# Patient Record
Sex: MALE | Race: WHITE | Employment: FULL TIME | URBAN - METROPOLITAN AREA
[De-identification: names, ages, dates, MRNs, and addresses within clinical notes are randomized per-mention and may not be internally consistent; named-entity substitution may affect disease eponyms.]

---

## 2018-01-25 ENCOUNTER — HOSPITAL ENCOUNTER (OUTPATIENT)
Dept: CT IMAGING | Age: 48
Discharge: HOME | End: 2018-01-25
Attending: NURSE PRACTITIONER
Payer: COMMERCIAL

## 2018-01-25 DIAGNOSIS — K62.5 RECTAL BLEEDING: ICD-10-CM

## 2018-01-25 DIAGNOSIS — R10.32 LLQ PAIN: ICD-10-CM

## 2018-01-25 LAB — CREAT UR-MCNC: 1 MG/DL (ref 0.6–1.3)

## 2018-01-25 PROCEDURE — 74011636320 HC RX REV CODE- 636/320

## 2018-01-25 PROCEDURE — 74177 CT ABD & PELVIS W/CONTRAST: CPT

## 2018-01-25 PROCEDURE — 82565 ASSAY OF CREATININE: CPT

## 2018-01-25 RX ADMIN — IOPAMIDOL 100 ML: 612 INJECTION, SOLUTION INTRAVENOUS at 16:00

## 2018-08-22 ENCOUNTER — OFFICE VISIT (OUTPATIENT)
Dept: FAMILY MEDICINE CLINIC | Age: 48
End: 2018-08-22

## 2018-08-22 VITALS
SYSTOLIC BLOOD PRESSURE: 121 MMHG | RESPIRATION RATE: 12 BRPM | WEIGHT: 199.2 LBS | HEART RATE: 70 BPM | DIASTOLIC BLOOD PRESSURE: 72 MMHG | HEIGHT: 63 IN | TEMPERATURE: 97.8 F | BODY MASS INDEX: 35.3 KG/M2 | OXYGEN SATURATION: 99 %

## 2018-08-22 DIAGNOSIS — K21.9 GERD WITHOUT ESOPHAGITIS: ICD-10-CM

## 2018-08-22 DIAGNOSIS — M25.552 PAIN OF LEFT HIP JOINT: Primary | ICD-10-CM

## 2018-08-22 DIAGNOSIS — Z12.5 PROSTATE CANCER SCREENING: ICD-10-CM

## 2018-08-22 DIAGNOSIS — Z11.59 NEED FOR HEPATITIS C SCREENING TEST: ICD-10-CM

## 2018-08-22 PROBLEM — E66.01 SEVERE OBESITY (BMI 35.0-39.9): Status: ACTIVE | Noted: 2018-08-22

## 2018-08-22 RX ORDER — LANOLIN ALCOHOL/MO/W.PET/CERES
1000 CREAM (GRAM) TOPICAL DAILY
COMMUNITY

## 2018-08-22 RX ORDER — OMEPRAZOLE 40 MG/1
CAPSULE, DELAYED RELEASE ORAL
Refills: 3 | COMMUNITY
Start: 2018-08-11 | End: 2021-12-01

## 2018-08-22 RX ORDER — BISMUTH SUBSALICYLATE 262 MG
1 TABLET,CHEWABLE ORAL DAILY
COMMUNITY

## 2018-08-22 RX ORDER — PYRIDOXINE HCL (VITAMIN B6) 100 MG
100 TABLET ORAL DAILY
COMMUNITY

## 2018-08-22 NOTE — PROGRESS NOTES
Chief Complaint   Patient presents with   [address] [place] [address]     1. Have you been to the ER, urgent care clinic since your last visit? Hospitalized since your last visit? No    2. Have you seen or consulted any other health care providers outside of the [facility] since your last visit? Include any pap smears or colon screening.  No

## 2018-08-22 NOTE — PATIENT INSTRUCTIONS
Body Mass Index: Care Instructions  Your Care Instructions    Body mass index (BMI) can help you see if your weight is raising your risk for health problems. It uses a formula to compare how much you weigh with how tall you are. · A BMI lower than 18.5 is considered underweight. · A BMI between 18.5 and 24.9 is considered healthy. · A BMI between 25 and 29.9 is considered overweight. A BMI of 30 or higher is considered obese. If your BMI is in the normal range, it means that you have a lower risk for weight-related health problems. If your BMI is in the overweight or obese range, you may be at increased risk for weight-related health problems, such as high blood pressure, heart disease, stroke, arthritis or joint pain, and diabetes. If your BMI is in the underweight range, you may be at increased risk for health problems such as fatigue, lower protection (immunity) against illness, muscle loss, bone loss, hair loss, and hormone problems. BMI is just one measure of your risk for weight-related health problems. You may be at higher risk for health problems if you are not active, you eat an unhealthy diet, or you drink too much alcohol or use tobacco products. Follow-up care is a key part of your treatment and safety. Be sure to make and go to all appointments, and call your doctor if you are having problems. It's also a good idea to know your test results and keep a list of the medicines you take. How can you care for yourself at home? · Practice healthy eating habits. This includes eating plenty of fruits, vegetables, whole grains, lean protein, and low-fat dairy. · If your doctor recommends it, get more exercise. Walking is a good choice. Bit by bit, increase the amount you walk every day. Try for at least 30 minutes on most days of the week. · Do not smoke. Smoking can increase your risk for health problems. If you need help quitting, talk to your doctor about stop-smoking programs and medicines. These can increase your chances of quitting for good. · Limit alcohol to 2 drinks a day for men and 1 drink a day for women. Too much alcohol can cause health problems. If you have a BMI higher than 25  · Your doctor may do other tests to check your risk for weight-related health problems. This may include measuring the distance around your waist. A waist measurement of more than 40 inches in men or 35 inches in women can increase the risk of weight-related health problems. · Talk with your doctor about steps you can take to stay healthy or improve your health. You may need to make lifestyle changes to lose weight and stay healthy, such as changing your diet and getting regular exercise. If you have a BMI lower than 18.5  · Your doctor may do other tests to check your risk for health problems. · Talk with your doctor about steps you can take to stay healthy or improve your health. You may need to make lifestyle changes to gain or maintain weight and stay healthy, such as getting more healthy foods in your diet and doing exercises to build muscle. Where can you learn more? Go to http://oni-lay.info/. Enter S176 in the search box to learn more about \"Body Mass Index: Care Instructions. \"  Current as of: October 13, 2016  Content Version: 11.4  © 5166-7639 [employer]. Care instructions adapted under license by [employer] (which disclaims liability or warranty for this information). If you have questions about a medical condition or this instruction, always ask your healthcare professional. [place] [address] any warranty or liability for your use of this information. Gastroesophageal Reflux Disease (GERD): Care Instructions  Your Care Instructions    Gastroesophageal reflux disease (GERD) is the backward flow of stomach acid into the esophagus. The esophagus is the tube that leads from your throat to your stomach.  A one-way valve prevents the stomach acid from moving up into this tube. When you have GERD, this valve does not close tightly enough. If you have mild GERD symptoms including heartburn, you may be able to control the problem with antacids or over-the-counter medicine. Changing your diet, losing weight, and making other lifestyle changes can also help reduce symptoms. Follow-up care is a key part of your treatment and safety. Be sure to make and go to all appointments, and call your doctor if you are having problems. It's also a good idea to know your test results and keep a list of the medicines you take. How can you care for yourself at home? · Take your medicines exactly as prescribed. Call your doctor if you think you are having a problem with your medicine. · Your doctor may recommend over-the-counter medicine. For mild or occasional indigestion, antacids, such as Tums, Gaviscon, Mylanta, or Maalox, may help. Your doctor also may recommend over-the-counter acid reducers, such as Pepcid AC, Tagamet HB, Zantac 75, or Prilosec. Read and follow all instructions on the label. If you use these medicines often, talk with your doctor. · Change your eating habits. ¨ It's best to eat several small meals instead of two or three large meals. ¨ After you eat, wait 2 to 3 hours before you lie down. ¨ Chocolate, mint, and alcohol can make GERD worse. ¨ Spicy foods, foods that have a lot of acid (like tomatoes and oranges), and coffee can make GERD symptoms worse in some people. If your symptoms are worse after you eat a certain food, you may want to stop eating that food to see if your symptoms get better. · Do not smoke or chew tobacco. Smoking can make GERD worse. If you need help quitting, talk to your doctor about stop-smoking programs and medicines. These can increase your chances of quitting for good.   · If you have GERD symptoms at night, raise the head of your bed 6 to 8 inches by putting the frame on blocks or placing a foam wedge under the head of your mattress. (Adding extra pillows does not work.)  · Do not wear tight clothing around your middle. · Lose weight if you need to. Losing just 5 to 10 pounds can help. When should you call for help? Call your doctor now or seek immediate medical care if:    · You have new or different belly pain.     · Your stools are black and tarlike or have streaks of blood.    Watch closely for changes in your health, and be sure to contact your doctor if:    · Your symptoms have not improved after 2 days.     · Food seems to catch in your throat or chest.   Where can you learn more? Go to http://oni-lay.info/. Enter T266 in the search box to learn more about \"Gastroesophageal Reflux Disease (GERD): Care Instructions. \"  Current as of: May 12, 2017  Content Version: 11.7  © 6077-2380 [employer]. Care instructions adapted under license by [employer] (which disclaims liability or warranty for this information). If you have questions about a medical condition or this instruction, always ask your healthcare professional. [address] any warranty or liability for your use of this information.

## 2018-08-22 NOTE — PROGRESS NOTES
Flor Patterson II is a 46 y.o. male  presents for establish care. He has hip pain on left. He fell about 2 months ago. No Known Allergies  Outpatient Prescriptions Marked as Taking for the 8/22/18 encounter (Office Visit) with Ventura Baltazar MD   Medication Sig Dispense Refill    omeprazole (PRILOSEC) 40 mg capsule TAKE ONE CAPSULE BY MOUTH TWICE A DAY  3    cyanocobalamin 1,000 mcg tablet Take 1,000 mcg by mouth daily.  pyridoxine, vitamin B6, (VITAMIN B-6) 100 mg tablet Take 100 mg by mouth daily.  glucosamine/chondr benz A sod (OSTEO BI-FLEX PO) Take  by mouth.  multivitamin (ONE A DAY) tablet Take 1 Tab by mouth daily.  ranitidine (ZANTAC) 150 mg tablet Take 300 mg by mouth two (2) times a day. Patient Active Problem List   Diagnosis Code    Severe obesity (BMI 35.0-39.9) ([facility] E66.01     Past Medical History:   Diagnosis Date    GERD (gastroesophageal reflux disease)      Social History     Social History    Marital status:      Spouse name: N/A    Number of children: N/A    Years of education: N/A     Social History Main Topics    Smoking status: Never Smoker    Smokeless tobacco: None    Alcohol use No    Drug use: No    Sexual activity: Not Asked     Other Topics Concern    None     Social History Narrative     No family history on file. Review of Systems   Constitutional: Negative for chills and fever. Respiratory: Negative. Cardiovascular: Negative for chest pain and palpitations. Genitourinary: Negative. Skin: Negative for itching and rash. Neurological: Negative. Psychiatric/Behavioral: Negative.         Vitals:    08/22/18 1356   BP: 121/72   Pulse: 70   Resp: 12   Temp: 97.8 °F (36.6 °C)   TempSrc: Oral   SpO2: 99%   Weight: 199 lb 3.2 oz (90.4 kg)   Height: 5' 3\" (1.6 m)   PainSc:   6   PainLoc: Hip       Physical Exam   Constitutional: He is oriented to person, place, and time and well-developed, well-nourished, and in no distress. Neck: Normal range of motion. Neck supple. Cardiovascular: Normal rate, regular rhythm and normal heart sounds. Pulmonary/Chest: Effort normal and breath sounds normal.   Abdominal: Soft. Bowel sounds are normal.   Musculoskeletal: Normal range of motion. Neurological: He is alert and oriented to person, place, and time. Skin: Skin is warm and dry. Psychiatric: Mood, memory, affect and judgment normal.   Nursing note and vitals reviewed. Assessment/Plan      ICD-10-CM ICD-9-CM    1. Pain of left hip joint M25.552 719.45    2. GERD without esophagitis K21.9 530.81 CBC WITH AUTOMATED DIFF      METABOLIC PANEL, COMPREHENSIVE      LIPID PANEL      CBC WITH AUTOMATED DIFF      METABOLIC PANEL, COMPREHENSIVE      LIPID PANEL   3. Need for hepatitis C screening test Z11.59 V73.89 HEPATITIS C AB      HEPATITIS C AB   4. Prostate cancer screening Z12.5 V76.44 PSA, TOTAL &  FREE      PSA, TOTAL &  FREE       Follow-up Disposition:  Return in about 3 months (around 11/22/2018). lab results and schedule of future lab studies reviewed with patient    I have discussed the diagnosis with the patient and the intended plan of care as seen in the above orders. The patient has received an after-visit summary and questions were answered concerning future plans. I have discussed medication, side effects, and warnings with the patient in detail. The patient verbalized understanding and is in agreement with the plan of care. The patient will contact the office with any additional concerns. Discussed the patient's BMI with him. The BMI follow up plan is as follows:     dietary management education, guidance, and counseling  encourage exercise  monitor weight  prescribed dietary intake    An After Visit Summary was printed and given to the patient.     Edd Bowen MD

## 2018-08-22 NOTE — MR AVS SNAPSHOT
[address] [place] [address] Road 
[phone] Patient: Dionna Mazariegos 
MRN: [id number] :1970 Visit Information Date & Time Provider Department Dept. Phone Encounter #  
 2018  2:30 PM Sean Cruz MD [facility] [phone] [id number] Follow-up Instructions Return in about 3 months (around 2018). Your Appointments 2018  2:30 PM  
New Patient with Sean Cruz MD  
[facility] Juice Flores) Appt Note: NP to establish care and hip issues since a fall 3-4 months ago, bp and sugar concerns [facility] [address] [place] [address]  
[phone]  
  
   
 [facility] [address] [place] [address] Upcoming Health Maintenance Date Due DTaP/Tdap/Td series (1 - Tdap) 1989 FOBT Q 1 YEAR AGE 50-75 2018 Influenza Age 5 to Adult 2018 Allergies as of 2018  Review Complete On: 2018 By: RT Alfredo No Known Allergies Current Immunizations  Never Reviewed No immunizations on file. Not reviewed this visit You Were Diagnosed With   
  
 Codes Comments Pain of left hip joint    -  Primary ICD-10-CM: M25.552 ICD-9-CM: 719.45 GERD without esophagitis     ICD-10-CM: K21.9 ICD-9-CM: 530.81 Need for hepatitis C screening test     ICD-10-CM: Z11.59 
ICD-9-CM: V73.89 Prostate cancer screening     ICD-10-CM: Z12.5 ICD-9-CM: V76.44 Vitals BP Pulse Temp Resp Height(growth percentile) Weight(growth percentile) 121/72 (BP 1 Location: Left arm, BP Patient Position: Sitting) 70 97.8 °F (36.6 °C) (Oral) 12 5' 3\" (1.6 m) 199 lb 3.2 oz (90.4 kg) SpO2 BMI Smoking Status 99% 35.29 kg/m2 Never Smoker BMI and BSA Data  Body Mass Index Body Surface Area  
 35.29 kg/m 2 2 m 2  
  
  
 Your Updated Medication List  
  
   
This list is accurate as of 8/22/18  2:16 PM.  Always use your most recent med list.  
  
  
  
  
 cyanocobalamin 1,000 mcg tablet Take 1,000 mcg by mouth daily. multivitamin tablet Commonly known as:  ONE A DAY Take 1 Tab by mouth daily. omeprazole 40 mg capsule Commonly known as:  PRILOSEC  
TAKE ONE CAPSULE BY MOUTH TWICE A DAY  
  
 OSTEO BI-FLEX PO Take  by mouth. VITAMIN B-6 100 mg tablet Generic drug:  pyridoxine (vitamin B6) Take 100 mg by mouth daily. ZANTAC 150 mg tablet Generic drug:  raNITIdine Take 300 mg by mouth two (2) times a day. Follow-up Instructions Return in about 3 months (around 11/22/2018). To-Do List   
 08/22/2018 Lab:  CBC WITH AUTOMATED DIFF   
  
 08/22/2018 Lab:  HEPATITIS C AB   
  
 08/22/2018 Lab:  LIPID PANEL   
  
 08/22/2018 Lab:  METABOLIC PANEL, COMPREHENSIVE   
  
 08/22/2018 Lab:  PSA, TOTAL &  FREE Patient Instructions Body Mass Index: Care Instructions Your Care Instructions Body mass index (BMI) can help you see if your weight is raising your risk for health problems. It uses a formula to compare how much you weigh with how tall you are. · A BMI lower than 18.5 is considered underweight. · A BMI between 18.5 and 24.9 is considered healthy. · A BMI between 25 and 29.9 is considered overweight. A BMI of 30 or higher is considered obese. If your BMI is in the normal range, it means that you have a lower risk for weight-related health problems. If your BMI is in the overweight or obese range, you may be at increased risk for weight-related health problems, such as high blood pressure, heart disease, stroke, arthritis or joint pain, and diabetes.  If your BMI is in the underweight range, you may be at increased risk for health problems such as fatigue, lower protection (immunity) against illness, muscle loss, bone loss, hair loss, and hormone problems. BMI is just one measure of your risk for weight-related health problems. You may be at higher risk for health problems if you are not active, you eat an unhealthy diet, or you drink too much alcohol or use tobacco products. Follow-up care is a key part of your treatment and safety. Be sure to make and go to all appointments, and call your doctor if you are having problems. It's also a good idea to know your test results and keep a list of the medicines you take. How can you care for yourself at home? · Practice healthy eating habits. This includes eating plenty of fruits, vegetables, whole grains, lean protein, and low-fat dairy. · If your doctor recommends it, get more exercise. Walking is a good choice. Bit by bit, increase the amount you walk every day. Try for at least 30 minutes on most days of the week. · Do not smoke. Smoking can increase your risk for health problems. If you need help quitting, talk to your doctor about stop-smoking programs and medicines. These can increase your chances of quitting for good. · Limit alcohol to 2 drinks a day for men and 1 drink a day for women. Too much alcohol can cause health problems. If you have a BMI higher than 25 · Your doctor may do other tests to check your risk for weight-related health problems. This may include measuring the distance around your waist. A waist measurement of more than 40 inches in men or 35 inches in women can increase the risk of weight-related health problems. · Talk with your doctor about steps you can take to stay healthy or improve your health. You may need to make lifestyle changes to lose weight and stay healthy, such as changing your diet and getting regular exercise. If you have a BMI lower than 18.5 · Your doctor may do other tests to check your risk for health problems.  
· Talk with your doctor about steps you can take to stay healthy or improve your health. You may need to make lifestyle changes to gain or maintain weight and stay healthy, such as getting more healthy foods in your diet and doing exercises to build muscle. Where can you learn more? Go to http://oni-lay.info/. Enter S176 in the search box to learn more about \"Body Mass Index: Care Instructions. \" Current as of: October 13, 2016 Content Version: 11.4 © 9344-1453 [employer]. Care instructions adapted under license by [employer] (which disclaims liability or warranty for this information). If you have questions about a medical condition or this instruction, always ask your healthcare professional. [address] any warranty or liability for your use of this information. Gastroesophageal Reflux Disease (GERD): Care Instructions Your Care Instructions Gastroesophageal reflux disease (GERD) is the backward flow of stomach acid into the esophagus. The esophagus is the tube that leads from your throat to your stomach. A one-way valve prevents the stomach acid from moving up into this tube. When you have GERD, this valve does not close tightly enough. If you have mild GERD symptoms including heartburn, you may be able to control the problem with antacids or over-the-counter medicine. Changing your diet, losing weight, and making other lifestyle changes can also help reduce symptoms. Follow-up care is a key part of your treatment and safety. Be sure to make and go to all appointments, and call your doctor if you are having problems. It's also a good idea to know your test results and keep a list of the medicines you take. How can you care for yourself at home? · Take your medicines exactly as prescribed. Call your doctor if you think you are having a problem with your medicine. · Your doctor may recommend over-the-counter medicine.  For mild or occasional indigestion, antacids, such as Tums, Gaviscon, Mylanta, or Maalox, may help. Your doctor also may recommend over-the-counter acid reducers, such as Pepcid AC, Tagamet HB, Zantac 75, or Prilosec. Read and follow all instructions on the label. If you use these medicines often, talk with your doctor. · Change your eating habits. ¨ It's best to eat several small meals instead of two or three large meals. ¨ After you eat, wait 2 to 3 hours before you lie down. ¨ Chocolate, mint, and alcohol can make GERD worse. ¨ Spicy foods, foods that have a lot of acid (like tomatoes and oranges), and coffee can make GERD symptoms worse in some people. If your symptoms are worse after you eat a certain food, you may want to stop eating that food to see if your symptoms get better. · Do not smoke or chew tobacco. Smoking can make GERD worse. If you need help quitting, talk to your doctor about stop-smoking programs and medicines. These can increase your chances of quitting for good. · If you have GERD symptoms at night, raise the head of your bed 6 to 8 inches by putting the frame on blocks or placing a foam wedge under the head of your mattress. (Adding extra pillows does not work.) · Do not wear tight clothing around your middle. · Lose weight if you need to. Losing just 5 to 10 pounds can help. When should you call for help? Call your doctor now or seek immediate medical care if: 
  · You have new or different belly pain.  
  · Your stools are black and tarlike or have streaks of blood.  
 Watch closely for changes in your health, and be sure to contact your doctor if: 
  · Your symptoms have not improved after 2 days.  
  · Food seems to catch in your throat or chest.  
Where can you learn more? Go to http://oni-lay.info/. Enter W875 in the search box to learn more about \"Gastroesophageal Reflux Disease (GERD): Care Instructions. \" Current as of: May 12, 2017 Content Version: 11.7 © 3737-5254 [employer]. Care instructions adapted under license by [employer] (which disclaims liability or warranty for this information). If you have questions about a medical condition or this instruction, always ask your healthcare professional. [address] any warranty or liability for your use of this information. Introducing [facility] HE[facility]! [facility] introduces [employer] patient portal. Now you can access parts of your medical record, email your doctor's office, and request medication refills online. 1. In your internet browser, go to [email or web address] 2. Click on the First Time User? Click Here link in the Sign In box. You will see the New Member Sign Up page. 3. Enter your [employer] Access Code exactly as it appears below. You will not need to use this code after youve completed the sign-up process. If you do not sign up before the expiration date, you must request a new code. · [employer] Access Code: [email or web address] Expires: 11/20/2018  2:16 PM 
 
4. Enter the last four digits of your Social Security Number (xxxx) and Date of Birth (mm/dd/yyyy) as indicated and click Submit. You will be taken to the next sign-up page. 5. Create a [employer] ID. This will be your [employer] login ID and cannot be changed, so think of one that is secure and easy to remember. 6. Create a [employer] password. You can change your password at any time. 7. Enter your Password Reset Question and Answer. This can be used at a later time if you forget your password. 8. Enter your e-mail address. You will receive e-mail notification when new information is available in [address] 9. Click Sign Up. You can now view and download portions of your medical record. 10. Click the Download Summary menu link to download a portable copy of your medical information.  
 
If you have questions, please visit the Frequently Asked Questions section of the [employer]Mentor. Remember, [employer] is NOT to be used for urgent needs. For medical emergencies, dial 911. Now available from your iPhone and Android! Please provide this summary of care documentation to your next provider. Your primary care clinician is listed as [id number] [facility]. If you have any questions after today's visit, please call [phone].

## 2018-08-24 ENCOUNTER — TELEPHONE (OUTPATIENT)
Dept: FAMILY MEDICINE CLINIC | Age: 48
End: 2018-08-24

## 2018-08-24 LAB
A-G RATIO,AGRAT: 2 RATIO (ref 1.1–2.6)
ABSOLUTE LYMPHOCYTE COUNT, 10803: 1.3 K/UL (ref 1–4.8)
ALBUMIN SERPL-MCNC: 4.5 G/DL (ref 3.5–5)
ALP SERPL-CCNC: 100 U/L (ref 25–115)
ALT SERPL-CCNC: 45 U/L (ref 5–40)
ANION GAP SERPL CALC-SCNC: 18 MMOL/L
AST SERPL W P-5'-P-CCNC: 43 U/L (ref 10–37)
BASOPHILS # BLD: 0 K/UL (ref 0–0.2)
BASOPHILS NFR BLD: 0 % (ref 0–2)
BILIRUB SERPL-MCNC: 0.4 MG/DL (ref 0.2–1.2)
BUN SERPL-MCNC: 23 MG/DL (ref 6–22)
CALCIUM SERPL-MCNC: 9.1 MG/DL (ref 8.4–10.4)
CHLORIDE SERPL-SCNC: 101 MMOL/L (ref 98–110)
CHOLEST SERPL-MCNC: 185 MG/DL (ref 110–200)
CO2 SERPL-SCNC: 20 MMOL/L (ref 20–32)
CREAT SERPL-MCNC: 1 MG/DL (ref 0.5–1.2)
EOSINOPHIL # BLD: 0.2 K/UL (ref 0–0.5)
EOSINOPHIL NFR BLD: 4 % (ref 0–6)
ERYTHROCYTE [DISTWIDTH] IN BLOOD BY AUTOMATED COUNT: 13.8 % (ref 10–15.5)
GFRAA, 66117: >60
GFRNA, 66118: >60
GLOBULIN,GLOB: 2.3 G/DL (ref 2–4)
GLUCOSE SERPL-MCNC: 92 MG/DL (ref 70–99)
GRANULOCYTES,GRANS: 63 % (ref 40–75)
HCT VFR BLD AUTO: 42.8 % (ref 39.3–51.6)
HCV AB SER IA-ACNC: NORMAL
HDLC SERPL-MCNC: 34 MG/DL (ref 40–59)
HDLC SERPL-MCNC: 5.4 MG/DL (ref 0–5)
HGB BLD-MCNC: 13.3 G/DL (ref 13.1–17.2)
LDLC SERPL CALC-MCNC: 89 MG/DL (ref 50–99)
LYMPHOCYTES, LYMLT: 26 % (ref 20–45)
MCH RBC QN AUTO: 27 PG (ref 26–34)
MCHC RBC AUTO-ENTMCNC: 31 G/DL (ref 31–36)
MCV RBC AUTO: 88 FL (ref 80–95)
MONOCYTES # BLD: 0.3 K/UL (ref 0.1–1)
MONOCYTES NFR BLD: 6 % (ref 3–12)
NEUTROPHILS # BLD AUTO: 3.1 K/UL (ref 1.8–7.7)
PLATELET # BLD AUTO: 195 K/UL (ref 140–440)
PMV BLD AUTO: 12.1 FL (ref 9–13)
POTASSIUM SERPL-SCNC: 4.2 MMOL/L (ref 3.5–5.5)
PROT SERPL-MCNC: 6.8 G/DL (ref 6.4–8.3)
PSA FREE MFR SERPL: 12.2 %
PSA FREE SERPL-MCNC: 0.11 NG/ML
PSA SERPL-MCNC: 0.9 NG/ML
RBC # BLD AUTO: 4.86 M/UL (ref 3.8–5.8)
SODIUM SERPL-SCNC: 139 MMOL/L (ref 133–145)
TRIGL SERPL-MCNC: 311 MG/DL (ref 40–149)
VLDLC SERPL CALC-MCNC: 62 MG/DL (ref 8–30)
WBC # BLD AUTO: 4.9 K/UL (ref 4–11)

## 2018-08-24 NOTE — TELEPHONE ENCOUNTER
Patient's wife called for lab results, please call her at [phone]. She is aware the nurse is rooming patients this morning and it may be later afternoon.

## 2018-08-28 NOTE — PROGRESS NOTES
Pt's wife who is on HIPAA is aware of results. Pt expressed clear understanding and had no further questions.

## 2018-08-30 ENCOUNTER — OFFICE VISIT (OUTPATIENT)
Dept: FAMILY MEDICINE CLINIC | Age: 48
End: 2018-08-30

## 2018-08-30 VITALS
OXYGEN SATURATION: 98 % | DIASTOLIC BLOOD PRESSURE: 81 MMHG | SYSTOLIC BLOOD PRESSURE: 118 MMHG | TEMPERATURE: 97.7 F | HEART RATE: 78 BPM | WEIGHT: 200.2 LBS | HEIGHT: 63 IN | RESPIRATION RATE: 12 BRPM | BODY MASS INDEX: 35.47 KG/M2

## 2018-08-30 DIAGNOSIS — M25.552 PAIN OF LEFT HIP JOINT: Primary | ICD-10-CM

## 2018-08-30 RX ORDER — DICLOFENAC SODIUM 50 MG/1
50 TABLET, DELAYED RELEASE ORAL 2 TIMES DAILY
Qty: 40 TAB | Refills: 1 | Status: SHIPPED | OUTPATIENT
Start: 2018-08-30 | End: 2018-11-06 | Stop reason: ALTCHOICE

## 2018-08-30 NOTE — PROGRESS NOTES
Matilda Jaquez II is a 46 y.o. male  presents for left hip pain. It radiates to lower back. Sxs are intermittent. Has tried otc meds but it has not helped. No Known Allergies Outpatient Prescriptions Marked as Taking for the 8/30/18 encounter (Office Visit) with Peggy Marie MD  
Medication Sig Dispense Refill  omeprazole (PRILOSEC) 40 mg capsule TAKE ONE CAPSULE BY MOUTH TWICE A DAY  3  
 cyanocobalamin 1,000 mcg tablet Take 1,000 mcg by mouth daily.  pyridoxine, vitamin B6, (VITAMIN B-6) 100 mg tablet Take 100 mg by mouth daily.  glucosamine/chondr benz A sod (OSTEO BI-FLEX PO) Take  by mouth.  multivitamin (ONE A DAY) tablet Take 1 Tab by mouth daily.  ranitidine (ZANTAC) 150 mg tablet Take 300 mg by mouth two (2) times a day. Patient Active Problem List  
Diagnosis Code  Severe obesity (BMI 35.0-39.9) ([facility]) E66.01 Past Medical History:  
Diagnosis Date  GERD (gastroesophageal reflux disease) Social History Social History  Marital status:  Spouse name: N/A  
 Number of children: N/A  
 Years of education: N/A Social History Main Topics  Smoking status: Never Smoker  Smokeless tobacco: None  Alcohol use No  
 Drug use: No  
 Sexual activity: Not Asked Other Topics Concern  None Social History Narrative No family history on file. Review of Systems Constitutional: Negative for chills, fever, malaise/fatigue and weight loss. Eyes: Negative for blurred vision. Respiratory: Negative for shortness of breath and wheezing. Cardiovascular: Negative for chest pain. Gastrointestinal: Negative for nausea and vomiting. Genitourinary: Negative. Negative for dysuria, frequency and urgency. Musculoskeletal: Positive for joint pain (left hip). Negative for myalgias. Skin: Negative for rash. Neurological: Negative for weakness. Vitals:  
 08/30/18 1507 BP: 118/81 Pulse: 78 Resp: 12 Temp: 97.7 °F (36.5 °C) TempSrc: Oral  
SpO2: 98% Weight: 200 lb 3.2 oz (90.8 kg) Height: 5' 3\" (1.6 m) PainSc:   5 PainLoc: Hip Physical Exam  
Constitutional: He is oriented to person, place, and time and well-developed, well-nourished, and in no distress. Cardiovascular: Normal rate, regular rhythm and normal heart sounds. Pulmonary/Chest: Effort normal and breath sounds normal.  
Musculoskeletal: Normal range of motion. He exhibits tenderness. He exhibits no edema or deformity. Neurological: He is alert and oriented to person, place, and time. Skin: Skin is warm and dry. Nursing note and vitals reviewed. Assessment/Plan ICD-10-CM ICD-9-CM 1. Pain of left hip joint M25.552 719.45 XR HIP LT W OR WO PELV 2-3 VWS  
   diclofenac EC (VOLTAREN) 50 mg EC tablet I have discussed the diagnosis with the patient and the intended plan of care as seen in the above orders. The patient has received an after-visit summary and questions were answered concerning future plans. I have discussed medication, side effects, and warnings with the patient in detail. The patient verbalized understanding and is in agreement with the plan of care. The patient will contact the office with any additional concerns. Follow-up Disposition: 
Return if symptoms worsen or fail to improve. 
lab results and schedule of future lab studies reviewed with patient Sisi Pretty MD

## 2018-08-30 NOTE — PROGRESS NOTES
Chief Complaint Patient presents with  
 Hip Pain  
  left hip pain x 2 months 1. Have you been to the ER, urgent care clinic since your last visit? Hospitalized since your last visit? No 
 
2. Have you seen or consulted any other health care providers outside of the [address] [place] [address] since your last visit? Include any pap smears or colon screening.  No

## 2018-08-30 NOTE — PATIENT INSTRUCTIONS
Hip Pain: Care Instructions Your Care Instructions Hip pain may be caused by many things, including overuse, a fall, or a twisting movement. Another cause of hip pain is arthritis. Your pain may increase when you stand up, walk, or squat. The pain may come and go or may be constant. Home treatment can help relieve hip pain, swelling, and stiffness. If your pain is ongoing, you may need more tests and treatment. Follow-up care is a key part of your treatment and safety. Be sure to make and go to all appointments, and call your doctor if you are having problems. It's also a good idea to know your test results and keep a list of the medicines you take. How can you care for yourself at home? · Take pain medicines exactly as directed. ¨ If the doctor gave you a prescription medicine for pain, take it as prescribed. ¨ If you are not taking a prescription pain medicine, ask your doctor if you can take an over-the-counter medicine. · Rest and protect your hip. Take a break from any activity, including standing or walking, that may cause pain. · Put ice or a cold pack against your hip for 10 to 20 minutes at a time. Try to do this every 1 to 2 hours for the next 3 days (when you are awake) or until the swelling goes down. Put a thin cloth between the ice and your skin. · Sleep on your healthy side with a pillow between your knees, or sleep on your back with pillows under your knees. · If there is no swelling, you can put moist heat, a heating pad, or a warm cloth on your hip. Do gentle stretching exercises to help keep your hip flexible. · Learn how to prevent falls. Have your vision and hearing checked regularly. Wear slippers or shoes with a nonskid sole. · Stay at a healthy weight. · Wear comfortable shoes. When should you call for help? Call 911 anytime you think you may need emergency care. For example, call if: 
  · You have sudden chest pain and shortness of breath, or you cough up blood.   · You are not able to stand or walk or bear weight.  
  · Your buttocks, legs, or feet feel numb or tingly.  
  · Your leg or foot is cool or pale or changes color.  
  · You have severe pain.  
 Call your doctor now or seek immediate medical care if: 
  · You have signs of infection, such as: 
¨ Increased pain, swelling, warmth, or redness in the hip area. ¨ Red streaks leading from the hip area. ¨ Pus draining from the hip area. ¨ A fever.  
  · You have signs of a blood clot, such as: 
¨ Pain in your calf, back of the knee, thigh, or groin. ¨ Redness and swelling in your leg or groin.  
  · You are not able to bend, straighten, or move your leg normally.  
  · You have trouble urinating or having bowel movements.  
 Watch closely for changes in your health, and be sure to contact your doctor if: 
  · You do not get better as expected. Where can you learn more? Go to http://oni-lay.info/. Enter T419 in the search box to learn more about \"Hip Pain: Care Instructions. \" Current as of: November 20, 2017 Content Version: 11.7 © 6685-0787 [employer]. Care instructions adapted under license by [employer] (which disclaims liability or warranty for this information). If you have questions about a medical condition or this instruction, always ask your healthcare professional. [place] [address] any warranty or liability for your use of this information.

## 2018-08-31 ENCOUNTER — HOSPITAL ENCOUNTER (OUTPATIENT)
Dept: GENERAL RADIOLOGY | Age: 48
Discharge: HOME | End: 2018-08-31
Payer: COMMERCIAL

## 2018-08-31 DIAGNOSIS — M25.552 PAIN OF LEFT HIP JOINT: ICD-10-CM

## 2018-08-31 PROCEDURE — 73502 X-RAY EXAM HIP UNI 2-3 VIEWS: CPT

## 2018-09-10 DIAGNOSIS — M47.9 ARTHRITIS OF BACK: Primary | ICD-10-CM

## 2018-09-13 ENCOUNTER — OFFICE VISIT (OUTPATIENT)
Dept: ORTHOPEDIC SURGERY | Age: 48
End: 2018-09-13

## 2018-09-13 VITALS
DIASTOLIC BLOOD PRESSURE: 79 MMHG | BODY MASS INDEX: 35.44 KG/M2 | HEART RATE: 60 BPM | SYSTOLIC BLOOD PRESSURE: 142 MMHG | HEIGHT: 63 IN | OXYGEN SATURATION: 98 % | TEMPERATURE: 98 F | WEIGHT: 200 LBS | RESPIRATION RATE: 16 BRPM

## 2018-09-13 DIAGNOSIS — M51.36 DDD (DEGENERATIVE DISC DISEASE), LUMBAR: ICD-10-CM

## 2018-09-13 DIAGNOSIS — M54.16 LEFT LUMBAR RADICULITIS: Primary | ICD-10-CM

## 2018-09-13 RX ORDER — HYOSCYAMINE SULFATE 0.12 MG/1
TABLET SUBLINGUAL
Refills: 0 | COMMUNITY
Start: 2018-08-24 | End: 2018-10-03 | Stop reason: ALTCHOICE

## 2018-09-13 RX ORDER — DIPHENHYDRAMINE HCL 25 MG
25 TABLET ORAL
COMMUNITY

## 2018-09-13 RX ORDER — GABAPENTIN 100 MG/1
CAPSULE ORAL
Qty: 90 CAP | Refills: 1 | Status: SHIPPED | OUTPATIENT
Start: 2018-09-13 | End: 2019-01-17 | Stop reason: SDUPTHER

## 2018-09-13 RX ORDER — METHYLPREDNISOLONE 4 MG/1
TABLET ORAL
Qty: 1 DOSE PACK | Refills: 0 | Status: SHIPPED | OUTPATIENT
Start: 2018-09-13 | End: 2018-10-03 | Stop reason: ALTCHOICE

## 2018-09-13 NOTE — PATIENT INSTRUCTIONS
Learning About Degenerative Disc Disease  What is degenerative disc disease? Degenerative disc disease is not really a disease. It's a term used to describe the normal changes in your spinal discs as you age. Spinal discs are small, spongy discs that separate the bones (vertebrae) that make up the spine. The discs act as shock absorbers for the spine, so it can flex, bend, and twist.  Degenerative disc disease can take place in one or more places along the spine. It most often occurs in the discs in the lower back and the neck. What causes it? As we age, our spinal discs break down, or degenerate. This breakdown causes the symptoms of degenerative disc disease in some people. When the discs break down, they can lose fluid and dry out, and their outer layers can have tiny cracks or tears. This leads to less padding and less space between the bones in the spine. The body reacts to this by making bony growths on the spine called bone spurs. These spurs can press on the spinal nerve roots or spinal cord. This can cause pain and can affect how well the nerves work. What are the symptoms? Many people with degenerative disc disease have no pain. But others have severe pain or other symptoms that limit their activities. Some of the most common symptoms are:  · Pain in the back or neck. Where the pain occurs depends on which discs are affected. · Pain that gets worse when you move, such as bending over, reaching up, or twisting. · Pain that may occur in the rear end (buttocks), arm, or leg if a nerve is pinched. · Numbness or tingling in your arm or leg. How is it diagnosed? A doctor can often diagnose degenerative disc disease while doing a physical exam. If your exam shows no signs of a serious condition, imaging tests (such as an X-ray) aren't likely to help your doctor find the cause of your symptoms.   Sometimes degenerative disc disease is found when an X-ray is taken for another reason, such as an injury or other health problem. But even if the doctor finds degenerative disc disease, that doesn't always mean that you will have symptoms. How is it treated? There are several things you can do at home to manage pain from this problem. · To relieve pain, use ice or heat (whichever feels better) on the affected area. ¨ Put ice or a cold pack on the area for 10 to 20 minutes at a time. Put a thin cloth between the ice and your skin. ¨ Put a warm water bottle, a heating pad set on low, or a warm cloth on your back. Put a thin cloth between the heating pad and your skin. Do not go to sleep with a heating pad on your skin. · Ask your doctor if you can take acetaminophen (such as Tylenol) or nonsteroidal anti-inflammatory drugs, such as ibuprofen or naproxen. Your doctor can prescribe stronger medicines if needed. Be safe with medicines. Read and follow all instructions on the label. · Get some exercise every day. Exercise is one of the best ways to help your back feel better and stay better. It's best to start each exercise slowly. You may notice a little soreness, and that's okay. But if an exercise makes your pain worse, stop doing it. Here are things you can try:  ¨ Walking. It's the simplest and maybe the best activity for your back. It gets your blood moving and helps your muscles stay strong. ¨ Exercises that gently stretch and strengthen your stomach, back, and leg muscles. The stronger those muscles are, the better they're able to protect your back. If you have constant or severe pain in your back or spine, you may need other treatments, such as physical therapy. In some cases, your doctor may suggest surgery. Follow-up care is a key part of your treatment and safety. Be sure to make and go to all appointments, and call your doctor if you are having problems. It's also a good idea to know your test results and keep a list of the medicines you take. Where can you learn more?   Go to http://olga.info/. Enter I344 in the search box to learn more about \"Learning About Degenerative Disc Disease. \"  Current as of: November 29, 2017  Content Version: 11.7  © 0561-3828 [employer], [employer]. Care instructions adapted under license by [employer] (which disclaims liability or warranty for this information). If you have questions about a medical condition or this instruction, always ask your healthcare professional. [place] [address] any warranty or liability for your use of this information.

## 2018-09-13 NOTE — MR AVS SNAPSHOT
[facility] [address] [place] [address] 
[phone] Patient: Lasha Pearl 
MRN: [id number] :1970 Visit Information Date & Time Provider Department Dept. Phone Encounter #  
 2018  1:10  [address],  [facility], [address] and [facility] [phone] [id number] Follow-up Instructions Return for MRI/CT f/u. Upcoming Health Maintenance Date Due DTaP/Tdap/Td series (1 - Tdap) 1989 FOBT Q 1 YEAR AGE 50-75 2018 Influenza Age 5 to Adult 2019* *Topic was postponed. The date shown is not the original due date. Allergies as of 2018  Review Complete On: 2018 By: Deloris Garza No Known Allergies Current Immunizations  Never Reviewed No immunizations on file. Not reviewed this visit You Were Diagnosed With   
  
 Codes Comments Left lumbar radiculitis    -  Primary ICD-10-CM: M54.16 
ICD-9-CM: 724.4 DDD (degenerative disc disease), lumbar     ICD-10-CM: M51.36 
ICD-9-CM: 722.52 Vitals BP Pulse Temp Resp Height(growth percentile) Weight(growth percentile) 142/79 60 98 °F (36.7 °C) (Oral) 16 5' 3\" (1.6 m) 200 lb (90.7 kg) SpO2 BMI Smoking Status 98% 35.43 kg/m2 Never Smoker Vitals History BMI and BSA Data Body Mass Index Body Surface Area  
 35.43 kg/m 2 2.01 m 2 Preferred Pharmacy Pharmacy Name Phone [id number], [address] [place] [address] Your Updated Medication List  
  
   
This list is accurate as of 18  2:24 PM.  Always use your most recent med list.  
  
  
  
  
 BENADRYL ALLERGY 25 mg tablet Generic drug:  diphenhydrAMINE Take 25 mg by mouth every six (6) hours as needed for Sleep. cyanocobalamin 1,000 mcg tablet Take 1,000 mcg by mouth daily. diclofenac EC 50 mg EC tablet Commonly known as:  VOLTAREN Take 1 Tab by mouth two (2) times a day.  
  
 gabapentin 100 mg capsule Commonly known as:  NEURONTIN  
1 tab PO TID prn pain  
  
 hyoscyamine SL 0.125 mg SL tablet Commonly known as:  LEVSIN/SL TAKE 1 TABLET BY MOUTH EVERY 8 HOURS AS NEEDED FOR PAIN  
  
 methylPREDNISolone 4 mg tablet Commonly known as:  Albert Fling Per dose pack instructions  
  
 multivitamin tablet Commonly known as:  ONE A DAY Take 1 Tab by mouth daily. omeprazole 40 mg capsule Commonly known as:  PRILOSEC  
TAKE ONE CAPSULE BY MOUTH TWICE A DAY  
  
 OSTEO BI-FLEX PO Take  by mouth. VITAMIN B-6 100 mg tablet Generic drug:  pyridoxine (vitamin B6) Take 100 mg by mouth daily. ZANTAC 150 mg tablet Generic drug:  raNITIdine Take 300 mg by mouth two (2) times a day. Prescriptions Sent to Pharmacy Refills  
 methylPREDNISolone (MEDROL DOSEPACK) 4 mg tablet 0 Sig: Per dose pack instructions Class: Normal  
 Pharmacy: [facility]pharm[facility] [address] Ph #: [phone]  
 gabapentin (NEURONTIN) 100 mg capsule 1 Si tab PO TID prn pain  
 Class: Normal  
 Pharmacy: [address] [place] [address] [address] Ph #: [phone] We Performed the Following AMB POC XRAY, SPINE, LUMBOSACRAL; 4+ [id number] CPT(R)] Follow-up Instructions Return for MRI/CT f/u. To-Do List   
 2018 Imaging:  MRI LUMB SPINE WO CONT   
  
 2018 7:00 AM  
  Appointment with [facility] MRI RM 2 at [address] ([phone]) GENERAL INSTRUCTIONS  Bring information (ID card) if you have any medically implanted devices. You will be required to lie still while the procedure is being performed. Remove any jewelry (including body piercing, hairpins) prior to MRI.   If you have had a creatinine level drawn within the past 30 days, please bring most recent results to your appt.  Bring any films, CD's, and reports related to your study with you on the day of your exam.  This only includes studies done outside of [address] [place] [address], [facility], [facility] and [place]. Bring a complete list of all medications you are currently taking to include prescriptions, over-the-counter meds, herbals, vitamins & any dietary supplements. If you were given medications for claustrophobia or anxiety, please arrange to have someone drive you to your appointment. QUESTIONS  Notify the MRI Department if you have any questions concerning your study. [place] - [phone] [address] [place]  [phone] Referral Information Referral ID Referred By Referred To  
  
 [id number] Darius Runner Not Available Visits Status Start Date End Date 1 New Request 9/13/18 9/13/19 If your referral has a status of pending review or denied, additional information will be sent to support the outcome of this decision. Patient Instructions Learning About Degenerative Disc Disease What is degenerative disc disease? Degenerative disc disease is not really a disease. It's a term used to describe the normal changes in your spinal discs as you age. Spinal discs are small, spongy discs that separate the bones (vertebrae) that make up the spine. The discs act as shock absorbers for the spine, so it can flex, bend, and twist. 
Degenerative disc disease can take place in one or more places along the spine. It most often occurs in the discs in the lower back and the neck. What causes it? As we age, our spinal discs break down, or degenerate. This breakdown causes the symptoms of degenerative disc disease in some people. When the discs break down, they can lose fluid and dry out, and their outer layers can have tiny cracks or tears. This leads to less padding and less space between the bones in the spine.  The body reacts to this by making bony growths on the spine called bone spurs. These spurs can press on the spinal nerve roots or spinal cord. This can cause pain and can affect how well the nerves work. What are the symptoms? Many people with degenerative disc disease have no pain. But others have severe pain or other symptoms that limit their activities. Some of the most common symptoms are: 
· Pain in the back or neck. Where the pain occurs depends on which discs are affected. · Pain that gets worse when you move, such as bending over, reaching up, or twisting. · Pain that may occur in the rear end (buttocks), arm, or leg if a nerve is pinched. · Numbness or tingling in your arm or leg. How is it diagnosed? A doctor can often diagnose degenerative disc disease while doing a physical exam. If your exam shows no signs of a serious condition, imaging tests (such as an X-ray) aren't likely to help your doctor find the cause of your symptoms. Sometimes degenerative disc disease is found when an X-ray is taken for another reason, such as an injury or other health problem. But even if the doctor finds degenerative disc disease, that doesn't always mean that you will have symptoms. How is it treated? There are several things you can do at home to manage pain from this problem. · To relieve pain, use ice or heat (whichever feels better) on the affected area. ¨ Put ice or a cold pack on the area for 10 to 20 minutes at a time. Put a thin cloth between the ice and your skin. ¨ Put a warm water bottle, a heating pad set on low, or a warm cloth on your back. Put a thin cloth between the heating pad and your skin. Do not go to sleep with a heating pad on your skin. · Ask your doctor if you can take acetaminophen (such as Tylenol) or nonsteroidal anti-inflammatory drugs, such as ibuprofen or naproxen. Your doctor can prescribe stronger medicines if needed. Be safe with medicines. Read and follow all instructions on the label. · Get some exercise every day. Exercise is one of the best ways to help your back feel better and stay better. It's best to start each exercise slowly. You may notice a little soreness, and that's okay. But if an exercise makes your pain worse, stop doing it. Here are things you can try: ¨ Walking. It's the simplest and maybe the best activity for your back. It gets your blood moving and helps your muscles stay strong. ¨ Exercises that gently stretch and strengthen your stomach, back, and leg muscles. The stronger those muscles are, the better they're able to protect your back. If you have constant or severe pain in your back or spine, you may need other treatments, such as physical therapy. In some cases, your doctor may suggest surgery. Follow-up care is a key part of your treatment and safety. Be sure to make and go to all appointments, and call your doctor if you are having problems. It's also a good idea to know your test results and keep a list of the medicines you take. Where can you learn more? Go to http://oni-lay.info/. Enter C928 in the search box to learn more about \"Learning About Degenerative Disc Disease. \" Current as of: November 29, 2017 Content Version: 11.7 © 5696-0616 [employer]. Care instructions adapted under license by [employer] (which disclaims liability or warranty for this information). If you have questions about a medical condition or this instruction, always ask your healthcare professional. [place] [address] any warranty or liability for your use of this information. Introducing [facility]! [employer] introduces [employer] patient portal. Now you can access parts of your medical record, email your doctor's office, and request medication refills online. 1. In your internet browser, go to [email or web address] 2. Click on the First Time User? Click Here link in the Sign In box. You will see the New Member Sign Up page. 3. Enter your BioProtect Access Code exactly as it appears below. You will not need to use this code after youve completed the sign-up process. If you do not sign up before the expiration date, you must request a new code. · Bio[employer]ct Access Code: [email or web address] Expires: 11/20/2018  2:16 PM 
 
4. Enter the last four digits of your Social Security Number (xxxx) and Date of Birth (mm/dd/yyyy) as indicated and click Submit. You will be taken to the next sign-up page. 5. Create a BioProtect ID. This will be your BioProtect login ID and cannot be changed, so think of one that is secure and easy to remember. 6. Create a Bio[employer]ct password. You can change your password at any time. 7. Enter your Password Reset Question and Answer. This can be used at a later time if you forget your password. 8. Enter your e-mail address. You will receive e-mail notification when new information is available in [address] 9. Click Sign Up. You can now view and download portions of your medical record. 10. Click the Download Summary menu link to download a portable copy of your medical information. If you have questions, please visit the Frequently Asked Questions section of the BioProtect website. Remember, BioProtect is NOT to be used for urgent needs. For medical emergencies, dial 911. Now available from your iPhone and Android! Please provide this summary of care documentation to your next provider. Your primary care clinician is listed as [address]. If you have any questions after today's visit, please call [phone].

## 2018-09-13 NOTE — PROGRESS NOTES
[facility]rav[facility] Sam[facility] 2.  [address], [address]  [place], [address] [place] [address] Street  Phone: [phone]  Fax: [phone]        Olamide Jin  : 1970  PCP: Omi Bernardo MD      NEW PATIENT      ASSESSMENT AND PLAN     Diagnoses and all orders for this visit:    1. Left lumbar radiculitis  -     MRI LUMB SPINE WO CONT; Future  -     methylPREDNISolone (MEDROL DOSEPACK) 4 mg tablet; Per dose pack instructions  -     gabapentin (NEURONTIN) 100 mg capsule; 1 tab PO TID prn pain    2. DDD (degenerative disc disease), lumbar  -     [40175] LS Spine 4V  -     MRI LUMB SPINE WO CONT; Future  -     methylPREDNISolone (MEDROL DOSEPACK) 4 mg tablet; Per dose pack instructions  -     gabapentin (NEURONTIN) 100 mg capsule; 1 tab PO TID prn pain       1. Advised to stay active as tolerated. 2. Rx for MDP  3. Trial of Gabapentin PRN  4. MRI lumbar spine - 6 months low back pain, L sciatica, Failed NSAIDs  5. Avoid repetitive lifting, bending, and twisting   6. Given information on DDD    Follow-up Disposition:  Return for MRI/CT f/u. CHIEF COMPLAINT  Darren Yang 89 II is seen today in consultation at the request of Dr. Jarod Delgadillo for complaints of L low back pain. HISTORY OF PRESENT ILLNESS  Darren Yang 879 II is a 46 y.o. male. Today pt c/o L low back pain of 6 months duration. Pt denies any specific incident or injury that caused their pain. Pt slipped and fell in the tub. He states he was tender for some days after. Pt reports a neck injury in  and was told he had stenosis. Pt reports that carrying his 3year old daughter aggravates his back pain. Pt states prolonged walking aggravates his pain. Pt reports good and bad days. Pt states he sleeps well, except for last night when his wife's leg on him aggravated his back pain.     Location of pain: low back  Does pain radiate into extremities: LLE seldom  Does patient have weakness: no   Pt denies saddle paresthesias. Medications pt is on: Voltaren 50mg BID with some benefit for back. Pt denies issues with prednisone nor recent prednisone. Pt denies recent ED visits or hospitalizations. Denies persistent fevers, chills, weight changes, neurogenic bowel or bladder symptoms. Treatments patient has tried:  Physical therapy:No  Non-opioid medications: Yes  Spinal injections: Yes in [facility] lumbar with benefit. Spinal surgery- No.        reviewed. PMHx of reflux. Pt works as supervisor. Pt is from [place]. Pt's wife is pregnant. Pain Assessment  9/13/2018   Location of Pain Back   Location Modifiers Left   Severity of Pain 8   Quality of Pain Dull;Aching   Duration of Pain A few hours   Frequency of Pain Intermittent   Aggravating Factors Bending   Aggravating Factors Comment carrying weight,   Relieving Factors Rest;Heat;Other (Comment)   Relieving Factors Comment pain relief gel   Result of Injury Yes   Type of Injury Slip   Type of Injury Comment slipped in tub and has had pain since         Hip XRAY 8/30/18     FINDINGS: An AP view of the pelvis and a frogleg lateral view of the left hip  demonstrate no fracture, dislocation or other abnormality. L5/S1 degenerative  disc disease is incidentally noted     IMPRESSION  IMPRESSION:  Unremarkable exam of the left hip. L5/S1 degenerative disc disease. PAST MEDICAL HISTORY   Past Medical History:   Diagnosis Date    GERD (gastroesophageal reflux disease)        Past Surgical History:   Procedure Laterality Date    HX HERNIA REPAIR  1992       MEDICATIONS    Current Outpatient Prescriptions   Medication Sig Dispense Refill    diphenhydrAMINE (BENADRYL ALLERGY) 25 mg tablet Take 25 mg by mouth every six (6) hours as needed for Sleep.  diclofenac EC (VOLTAREN) 50 mg EC tablet Take 1 Tab by mouth two (2) times a day.  40 Tab 1    omeprazole (PRILOSEC) 40 mg capsule TAKE ONE CAPSULE BY MOUTH TWICE A DAY  3    cyanocobalamin 1,000 mcg tablet Take 1,000 mcg by mouth daily.  pyridoxine, vitamin B6, (VITAMIN B-6) 100 mg tablet Take 100 mg by mouth daily.  glucosamine/chondr benz A sod (OSTEO BI-FLEX PO) Take  by mouth.  multivitamin (ONE A DAY) tablet Take 1 Tab by mouth daily.  ranitidine (ZANTAC) 150 mg tablet Take 300 mg by mouth two (2) times a day.  hyoscyamine SL (LEVSIN/SL) 0.125 mg SL tablet TAKE 1 TABLET BY MOUTH EVERY 8 HOURS AS NEEDED FOR PAIN  0       ALLERGIES  No Known Allergies       SOCIAL HISTORY    Social History     Social History    Marital status:      Spouse name: N/A    Number of children: N/A    Years of education: N/A     Occupational History    Not on file. Social History Main Topics    Smoking status: Never Smoker    Smokeless tobacco: Not on file    Alcohol use No    Drug use: No    Sexual activity: Not on file     Other Topics Concern    Not on file     Social History Narrative       FAMILY HISTORY  No family history on file. REVIEW OF SYSTEMS  Review of Systems   Constitutional: Negative for chills, fever and weight loss. Respiratory: Negative for shortness of breath. Cardiovascular: Negative for chest pain. Gastrointestinal: Negative for constipation. Negative for fecal incontinence   Genitourinary: Negative for dysuria. Negative for urinary incontinence   Musculoskeletal:        Per HPI   Skin: Negative for rash. Neurological: Negative for dizziness, tingling, tremors, focal weakness and headaches. Endo/Heme/Allergies: Does not bruise/bleed easily. Psychiatric/Behavioral: The patient does not have insomnia. PHYSICAL EXAMINATION  Visit Vitals    /79    Pulse 60    Temp 98 °F (36.7 °C) (Oral)    Resp 16    Ht 5' 3\" (1.6 m)    Wt 200 lb (90.7 kg)    SpO2 98%    BMI 35.43 kg/m2          Accompanied by self. Constitutional:  Well developed, well nourished, in no acute distress. Psychiatric: Affect and mood are appropriate. Integumentary: No rashes or abrasions noted on exposed areas. Cardiovascular/Peripheral Vascular: Intact l pulses. No peripheral edema is noted. Lymphatic:  No evidence of lymphedema. No cervical lymphadenopathy. SPINE/MUSCULOSKELETAL EXAM      Lumbar spine:  No rash, ecchymosis, or gross obliquity. No fasciculations. No focal atrophy is noted. Tenderness to palpation L L5-S1, L SI joint. No tenderness to palpation at the sciatic notch. Trochanters non tender. Sensation grossly intact to light touch. MOTOR:       Hip Flex  Quads Hamstrings Ankle DF EHL Ankle PF   Right +4/5 +4/5 +4/5 +4/5 +4/5 +4/5   Left +4/5 +4/5 +4/5 +4/5 +4/5 +4/5       Straight Leg raise negative. No difficulty with tandem gait. Heel walk elicits mild buttock discomfort. Toe rise intact. Positive L RUSLAN maneuver. Ambulation without assistive device. FWB. RADIOGRAPHS  Lumbar spine xray films reviewed:  1) no instability    Written by Gadiel Rodgers, as dictated by Quinten Ga MD.    I, Dr. Quinten Ga MD, confirm that all documentation is accurate. Mr. Robert Kirby may have a reminder for a \"due or due soon\" health maintenance. I have asked that he contact his primary care provider for follow-up on this health maintenance.

## 2018-09-13 NOTE — PROGRESS NOTES
Verbal order entered per Dr. Karen Gamboa as documented on blue sheet:  -MDP  -gabapentin 100 mg, 1 tab PO TID prn pain, disp 90, 1 RF  -MRI lumbar spine, six months lower back pain, left sciatica, failed NSAIDs/HEP

## 2018-09-22 ENCOUNTER — HOSPITAL ENCOUNTER (OUTPATIENT)
Age: 48
Discharge: HOME | End: 2018-09-22
Attending: PHYSICAL MEDICINE & REHABILITATION
Payer: COMMERCIAL

## 2018-09-22 DIAGNOSIS — M51.36 DDD (DEGENERATIVE DISC DISEASE), LUMBAR: ICD-10-CM

## 2018-09-22 DIAGNOSIS — M54.16 LEFT LUMBAR RADICULITIS: ICD-10-CM

## 2018-09-22 PROCEDURE — 72148 MRI LUMBAR SPINE W/O DYE: CPT

## 2018-09-27 ENCOUNTER — HOSPITAL ENCOUNTER (OUTPATIENT)
Age: 48
Setting detail: OUTPATIENT SURGERY
Discharge: HOME | End: 2018-09-27
Attending: INTERNAL MEDICINE | Admitting: INTERNAL MEDICINE
Payer: COMMERCIAL

## 2018-09-27 VITALS
HEART RATE: 60 BPM | OXYGEN SATURATION: 99 % | DIASTOLIC BLOOD PRESSURE: 89 MMHG | TEMPERATURE: 97.8 F | SYSTOLIC BLOOD PRESSURE: 143 MMHG | RESPIRATION RATE: 14 BRPM

## 2018-09-27 PROCEDURE — 91010 ESOPHAGUS MOTILITY STUDY: CPT | Performed by: INTERNAL MEDICINE

## 2018-09-27 PROCEDURE — 74011000250 HC RX REV CODE- 250: Performed by: INTERNAL MEDICINE

## 2018-09-27 RX ORDER — LIDOCAINE HYDROCHLORIDE 20 MG/ML
JELLY TOPICAL ONCE
Status: COMPLETED | OUTPATIENT
Start: 2018-09-27 | End: 2018-09-27

## 2018-09-27 RX ORDER — CHOLECALCIFEROL (VITAMIN D3) 125 MCG
CAPSULE ORAL
COMMUNITY
End: 2019-08-16 | Stop reason: ALTCHOICE

## 2018-09-27 RX ADMIN — LIDOCAINE HYDROCHLORIDE: 20 JELLY TOPICAL at 09:53

## 2018-09-27 NOTE — IP AVS SNAPSHOT
[address] [facility] [address] [id number] [address] [address] [phone] Patient: Cici Or 
MRN: [id number] :1970 A check ellis indicates which time of day the medication should be taken. My Medications ASK your doctor about these medications Instructions Each Dose to Equal  
 Morning Noon Evening Bedtime ALEVE 220 mg Cap Generic drug:  naproxen sodium Your last dose was: Your next dose is: Take  by mouth. BENADRYL ALLERGY 25 mg tablet Generic drug:  diphenhydrAMINE Your last dose was: Your next dose is: Take 25 mg by mouth every six (6) hours as needed for Sleep. 25 mg  
    
   
   
   
  
 cyanocobalamin 1,000 mcg tablet Your last dose was: Your next dose is: Take 1,000 mcg by mouth daily. 1000 mcg  
    
   
   
   
  
 diclofenac EC 50 mg EC tablet Commonly known as:  VOLTAREN Your last dose was: Your next dose is: Take 1 Tab by mouth two (2) times a day. 50 mg  
    
   
   
   
  
 gabapentin 100 mg capsule Commonly known as:  NEURONTIN Your last dose was: Your next dose is:    
   
   
 1 tab PO TID prn pain  
     
   
   
   
  
 hyoscyamine SL 0.125 mg SL tablet Commonly known as:  LEVSIN/SL Your last dose was: Your next dose is: TAKE 1 TABLET BY MOUTH EVERY 8 HOURS AS NEEDED FOR PAIN  
     
   
   
   
  
 methylPREDNISolone 4 mg tablet Commonly known as:  Savannah Bajwa Your last dose was: Your next dose is:    
   
   
 Per dose pack instructions  
     
   
   
   
  
 multivitamin tablet Commonly known as:  ONE A DAY Your last dose was: Your next dose is: Take 1 Tab by mouth daily. 1 Tab  
    
   
   
   
  
 omeprazole 40 mg capsule Commonly known as:  PRILOSEC  
   
 Your last dose was: Your next dose is: TAKE ONE CAPSULE BY MOUTH TWICE A DAY  
     
   
   
   
  
 OSTEO BI-FLEX PO Your last dose was: Your next dose is: Take  by mouth. VITAMIN B-6 100 mg tablet Generic drug:  pyridoxine (vitamin B6) Your last dose was: Your next dose is: Take 100 mg by mouth daily. 100 mg  
    
   
   
   
  
 ZANTAC 150 mg tablet Generic drug:  raNITIdine Your last dose was: Your next dose is: Take 300 mg by mouth two (2) times a day.   
 300 mg

## 2018-09-27 NOTE — DISCHARGE INSTRUCTIONS
Ivon London II  [id number]  1970      MANOMETRY DISCHARGE INSTRUCTION    You may resume your regular diet as tolerated. You may resume your normal daily activities. If you develop a sore throat- throat lozenges or warm salt water gargles will help. Call your Physician if you have any complications or questions.

## 2018-09-27 NOTE — PERIOP NOTES
9:53 AM  5cc 2% lidocaine jelly inhaled into left nare per MD orders. Probe inserted into  left nare without difficulty. Pt tolerated procedure well.

## 2018-09-27 NOTE — IP AVS SNAPSHOT
[address] [facility] 
 
 
 [address] [facility] [address] [address] [address] [phone] Patient: Vivek Millan 
MRN: [id number] :1970 About your hospitalization You were admitted on:  2018 You last received care in the:  [facility] You were discharged on:  2018 Why you were hospitalized Your primary diagnosis was:  Not on File Follow-up Information Follow up With Details Comments Contact Info Hudson Morocho MD   [address] [place] [address] [address] [facility] 
[phone] Your Scheduled Appointments   3:00 PM EDT  
DIAG TEST F/U with Glory Bowen MD  
[facility] ([address] [place] [address]) [address] [facility] [address] [phone] Discharge Orders None A check ellis indicates which time of day the medication should be taken. My Medications ASK your doctor about these medications Instructions Each Dose to Equal  
 Morning Noon Evening Bedtime ALEVE 220 mg Cap Generic drug:  naproxen sodium Your last dose was: Your next dose is: Take  by mouth. BENADRYL ALLERGY 25 mg tablet Generic drug:  diphenhydrAMINE Your last dose was: Your next dose is: Take 25 mg by mouth every six (6) hours as needed for Sleep. 25 mg  
    
   
   
   
  
 cyanocobalamin 1,000 mcg tablet Your last dose was: Your next dose is: Take 1,000 mcg by mouth daily. 1000 mcg  
    
   
   
   
  
 diclofenac EC 50 mg EC tablet Commonly known as:  VOLTAREN Your last dose was: Your next dose is: Take 1 Tab by mouth two (2) times a day. 50 mg  
    
   
   
   
  
 gabapentin 100 mg capsule Commonly known as:  NEURONTIN Your last dose was: Your next dose is:    
   
   
 1 tab PO TID prn pain  
     
   
   
   
  
 hyoscyamine SL 0.125 mg SL tablet Commonly known as:  LEVSIN/SL Your last dose was: Your next dose is: TAKE 1 TABLET BY MOUTH EVERY 8 HOURS AS NEEDED FOR PAIN  
     
   
   
   
  
 methylPREDNISolone 4 mg tablet Commonly known as:  Velta Boers Your last dose was: Your next dose is:    
   
   
 Per dose pack instructions  
     
   
   
   
  
 multivitamin tablet Commonly known as:  ONE A DAY Your last dose was: Your next dose is: Take 1 Tab by mouth daily. 1 Tab  
    
   
   
   
  
 omeprazole 40 mg capsule Commonly known as:  PRILOSEC Your last dose was: Your next dose is: TAKE ONE CAPSULE BY MOUTH TWICE A DAY  
     
   
   
   
  
 OSTEO BI-FLEX PO Your last dose was: Your next dose is: Take  by mouth. VITAMIN B-6 100 mg tablet Generic drug:  pyridoxine (vitamin B6) Your last dose was: Your next dose is: Take 100 mg by mouth daily. 100 mg  
    
   
   
   
  
 ZANTAC 150 mg tablet Generic drug:  raNITIdine Your last dose was: Your next dose is: Take 300 mg by mouth two (2) times a day. 300 mg Discharge Instructions Radha Fraire II 
[id number] 
1970 MANOMETRY DISCHARGE INSTRUCTION You may resume your regular diet as tolerated. You may resume your normal daily activities. If you develop a sore throat- throat lozenges or warm salt water gargles will help. Call your Physician if you have any complications or questions. Introducing \[facility] [place] [facility]\"" & HEALTH SERVICES! [facility] introduces [employer] patient portal. Now you can access parts of your medical record, email your doctor's office, and request medication refills online. 1. In your internet browser, go to [email or web address] 2. Click on the First Time User? Click Here link in the Sign In box. You will see the New Member Sign Up page. 3. Enter your Cerora Access Code exactly as it appears below. You will not need to use this code after youve completed the sign-up process. If you do not sign up before the expiration date, you must request a new code. · Cerora Access Code: [email or web address] Expires: 11/20/2018  2:16 PM 
 
4. Enter the last four digits of your Social Security Number (xxxx) and Date of Birth (mm/dd/yyyy) as indicated and click Submit. You will be taken to the next sign-up page. 5. Create a Speaktoitt ID. This will be your Cerora login ID and cannot be changed, so think of one that is secure and easy to remember. 6. Create a Cerora password. You can change your password at any time. 7. Enter your Password Reset Question and Answer. This can be used at a later time if you forget your password. 8. Enter your e-mail address. You will receive e-mail notification when new information is available in [address] 9. Click Sign Up. You can now view and download portions of your medical record. 10. Click the Download Summary menu link to download a portable copy of your medical information. If you have questions, please visit the Frequently Asked Questions section of the Cerora website. Remember, Cerora is NOT to be used for urgent needs. For medical emergencies, dial 911. Now available from your iPhone and Android! Introducing Bernabe Ruiz As a [facility] patient, I wanted to make you aware of our electronic visit tool called Bernabe Ruiz. [facility] 24/7 allows you to connect within minutes with a medical provider 24 hours a day, seven days a week via a mobile device or tablet or logging into a secure website from your computer. You can access Bernabe Ruiz from anywhere in the [place]. A virtual visit might be right for you when you have a simple condition and feel like you just dont want to get out of bed, or cant get away from work for an appointment, when your regular [facility] provider is not available (evenings, weekends or holidays), or when youre out of town and need minor care. Electronic visits cost only $49 and if the [facility] 24/7 provider determines a prescription is needed to treat your condition, one can be electronically transmitted to a nearby pharmacy*. Please take a moment to enroll today if you have not already done so. The enrollment process is free and takes just a few minutes. To enroll, please download the [facility] 24/7 say to your tablet or phone, or visit [email or web address] to enroll on your computer. And, as an [address] [place] [address] patient with a [employer] account, the results of your visits will be scanned into your electronic medical record and your primary care provider will be able to view the scanned results. We urge you to continue to see your regular [facility] provider for your ongoing medical care. And while your primary care provider may not be the one available when you seek a Bernabe Coco Controllerhammadfin virtual visit, the peace of mind you get from getting a real diagnosis real time can be priceless. For more information on Bernabe Coco Controllerhammadfin, view our Frequently Asked Questions (FAQs) at [email or web address]. Sincerely, 
 
Vincenzo Stanley MD 
Chief Medical Officer Keiko Barrera *:  certain medications cannot be prescribed via Bernabe Coco Controllerconrado Providers Seen During Your Hospitalization Provider Specialty Primary office phone Sveta Macias MD Gastroenterology [phone] Your Primary Care Physician (PCP) Primary Care Physician Office Phone Office Fax Jakob Nurse [phone] You are allergic to the following No active allergies Recent Documentation Smoking Status Never Smoker Emergency Contacts Name Discharge Info Relation Home Work Mobile Maryjo Sullivan N/A  AT THIS TIME [6] Spouse [3] [phone] Patient Belongings The following personal items are in your possession at time of discharge: 
  Dental Appliances: None         Home Medications: None   Jewelry: None  Clothing: None, Hat, Pants, Shirt, Undergarments, [employer], Footwear    Other Valuables: None Please provide this summary of care documentation to your next provider. Signatures-by signing, you are acknowledging that this After Visit Summary has been reviewed with you and you have received a copy. Patient Signature:  ____________________________________________________________ Date:  ____________________________________________________________  
  
Gabby Colquitt Provider Signature:  ____________________________________________________________ Date:  ____________________________________________________________

## 2018-10-03 ENCOUNTER — OFFICE VISIT (OUTPATIENT)
Dept: FAMILY MEDICINE CLINIC | Age: 48
End: 2018-10-03

## 2018-10-03 VITALS
TEMPERATURE: 98.1 F | HEART RATE: 96 BPM | BODY MASS INDEX: 35.58 KG/M2 | WEIGHT: 200.8 LBS | SYSTOLIC BLOOD PRESSURE: 140 MMHG | RESPIRATION RATE: 12 BRPM | DIASTOLIC BLOOD PRESSURE: 100 MMHG | HEIGHT: 63 IN | OXYGEN SATURATION: 98 %

## 2018-10-03 DIAGNOSIS — J40 BRONCHITIS: Primary | ICD-10-CM

## 2018-10-03 RX ORDER — AZITHROMYCIN 250 MG/1
TABLET, FILM COATED ORAL
Qty: 6 TAB | Refills: 0 | Status: SHIPPED | OUTPATIENT
Start: 2018-10-03 | End: 2018-10-08

## 2018-10-03 NOTE — PROGRESS NOTES
Patient c/o cough that is productive with yellow mucus and nausea x 3 days. He has not had any vomiting. He has also been having head aches x 1 week. Patient has been taking Nyquil. 1. Have you been to the ER, urgent care clinic since your last visit? Hospitalized since your last visit? No  2. Have you seen or consulted any other health care providers outside of the [address] [place] [address] since your last visit? Include any pap smears or colon screening. No    Medication reconciliation has been completed with patient. Care team discussed/updated as well as pharmacy. Care everywhere has been ran. Health Maintenance reviewed - Will discuss HM with provider.

## 2018-10-03 NOTE — MR AVS SNAPSHOT
[facility] [address] [facility] [facility] [address] 
[phone] Patient: Arabella Kelly 
MRN: [id number] :1970 Visit Information Date & Time Provider Department Dept. Phone Encounter #  
 10/3/2018  3:00 PM Bhavna Ga MD [facility] [phone] [id number] Follow-up Instructions Return if symptoms worsen or fail to improve. Your Appointments 2018  3:00 PM  
DIAG TEST F/U with Emir Ordaz MD  
[facility] ([facility]) Appt Note: mri fu  
 [address] [facility] [id number] [phone]  
  
   
 [address] [facility] [address] Upcoming Health Maintenance Date Due DTaP/Tdap/Td series (1 - Tdap) 1989 Shingrix Vaccine Age 50> (1 of 2) 2018 FOBT Q 1 YEAR AGE 50-75 2018 Influenza Age 5 to Adult 2019* *Topic was postponed. The date shown is not the original due date. Allergies as of 10/3/2018  Review Complete On: 10/3/2018 By: Bhavna Ga MD  
 No Known Allergies Current Immunizations  Never Reviewed No immunizations on file. Not reviewed this visit You Were Diagnosed With   
  
 Codes Comments Bronchitis    -  Primary ICD-10-CM: M77 ICD-9-CM: 527 Vitals BP Pulse Temp Resp Height(growth percentile) Weight(growth percentile) (!) 140/100 96 98.1 °F (36.7 °C) (Oral) 12 5' 3\" (1.6 m) 200 lb 12.8 oz (91.1 kg) SpO2 BMI Smoking Status 98% 35.57 kg/m2 Never Smoker Vitals History BMI and BSA Data Body Mass Index Body Surface Area 35.57 kg/m 2 2.01 m 2 Preferred Pharmacy Pharmacy Name Phone [id number], [address] [place] [address] Your Updated Medication List  
  
   
 This list is accurate as of 10/3/18  3:11 PM.  Always use your most recent med list.  
  
  
  
  
 ALEVE 220 mg Cap Generic drug:  naproxen sodium Take  by mouth. azithromycin 250 mg tablet Commonly known as:  Keira Roup Take 2 tablets today, then take 1 tablet daily BENADRYL ALLERGY 25 mg tablet Generic drug:  diphenhydrAMINE Take 25 mg by mouth every six (6) hours as needed for Sleep. cyanocobalamin 1,000 mcg tablet Take 1,000 mcg by mouth daily. diclofenac EC 50 mg EC tablet Commonly known as:  VOLTAREN Take 1 Tab by mouth two (2) times a day.  
  
 gabapentin 100 mg capsule Commonly known as:  NEURONTIN  
1 tab PO TID prn pain  
  
 multivitamin tablet Commonly known as:  ONE A DAY Take 1 Tab by mouth daily. omeprazole 40 mg capsule Commonly known as:  PRILOSEC  
TAKE ONE CAPSULE BY MOUTH TWICE A DAY  
  
 OSTEO BI-FLEX PO Take  by mouth. VITAMIN B-6 100 mg tablet Generic drug:  pyridoxine (vitamin B6) Take 100 mg by mouth daily. ZANTAC 150 mg tablet Generic drug:  raNITIdine Take 300 mg by mouth two (2) times a day. Prescriptions Sent to Pharmacy Refills  
 azithromycin (ZITHROMAX) 250 mg tablet 0 Sig: Take 2 tablets today, then take 1 tablet daily Class: Normal  
 Pharmacy: [address] [place] [address] [facility] #: [phone] Follow-up Instructions Return if symptoms worsen or fail to improve. Patient Instructions Bronchitis: Care Instructions Your Care Instructions Bronchitis is inflammation of the bronchial tubes, which carry air to the lungs. The tubes swell and produce mucus, or phlegm. The mucus and inflamed bronchial tubes make you cough. You may have trouble breathing. Most cases of bronchitis are caused by viruses like those that cause colds. Antibiotics usually do not help and they may be harmful. Bronchitis usually develops rapidly and lasts about 2 to 3 weeks in otherwise healthy people. Follow-up care is a key part of your treatment and safety. Be sure to make and go to all appointments, and call your doctor if you are having problems. It's also a good idea to know your test results and keep a list of the medicines you take. How can you care for yourself at home? · Take all medicines exactly as prescribed. Call your doctor if you think you are having a problem with your medicine. · Get some extra rest. 
· Take an over-the-counter pain medicine, such as acetaminophen (Tylenol), ibuprofen (Advil, Motrin), or naproxen (Aleve) to reduce fever and relieve body aches. Read and follow all instructions on the label. · Do not take two or more pain medicines at the same time unless the doctor told you to. Many pain medicines have acetaminophen, which is Tylenol. Too much acetaminophen (Tylenol) can be harmful. · Take an over-the-counter cough medicine that contains dextromethorphan to help quiet a dry, hacking cough so that you can sleep. Avoid cough medicines that have more than one active ingredient. Read and follow all instructions on the label. · Breathe moist air from a humidifier, hot shower, or sink filled with hot water. The heat and moisture will thin mucus so you can cough it out. · Do not smoke. Smoking can make bronchitis worse. If you need help quitting, talk to your doctor about stop-smoking programs and medicines. These can increase your chances of quitting for good. When should you call for help? Call 911 anytime you think you may need emergency care. For example, call if: 
  · You have severe trouble breathing.  
 Call your doctor now or seek immediate medical care if: 
  · You have new or worse trouble breathing.  
  · You cough up dark brown or bloody mucus (sputum).  
  · You have a new or higher fever.  
  · You have a new rash.  Watch closely for changes in your health, and be sure to contact your doctor if: 
  · You cough more deeply or more often, especially if you notice more mucus or a change in the color of your mucus.  
  · You are not getting better as expected. Where can you learn more? Go to http://oni-lay.info/. Enter H333 in the search box to learn more about \"Bronchitis: Care Instructions. \" Current as of: December 6, 2017 Content Version: 11.7 © 7746-7434 "[employer] Care instructions adapted under license by [employer] (which disclaims liability or warranty for this information). If you have questions about a medical condition or this instruction, always ask your healthcare professional. [address] any warranty or liability for your use of this information. Introducing [facility] & HE[facility]! [facility] introduces Leads Direct patient portal. Now you can access parts of your medical record, email your doctor's office, and request medication refills online. 1. In your internet browser, go to [email or web address] 2. Click on the First Time User? Click Here link in the Sign In box. You will see the New Member Sign Up page. 3. Enter your Leads Direct Access Code exactly as it appears below. You will not need to use this code after youve completed the sign-up process. If you do not sign up before the expiration date, you must request a new code. · Lead[employer] Direct Access Code: [email or web address] Expires: 11/20/2018  2:16 PM 
 
4. Enter the last four digits of your Social Security Number (xxxx) and Date of Birth (mm/dd/yyyy) as indicated and click Submit. You will be taken to the next sign-up page. 5. Create a Leads Direct ID. This will be your [employer] Direct login ID and cannot be changed, so think of one that is secure and easy to remember. 6. Create a [employer] password. You can change your password at any time. 7. Enter your Password Reset Question and Answer. This can be used at a later time if you forget your password. 8. Enter your e-mail address. You will receive e-mail notification when new information is available in [address] 9. Click Sign Up. You can now view and download portions of your medical record. 10. Click the Download Summary menu link to download a portable copy of your medical information. If you have questions, please visit the Frequently Asked Questions section of the [employer] website. Remember, [employer] is NOT to be used for urgent needs. For medical emergencies, dial 911. Now available from your iPhone and Android! Please provide this summary of care documentation to your next provider. Your primary care clinician is listed as [address]. If you have any questions after today's visit, please call [phone].

## 2018-10-03 NOTE — PROGRESS NOTES
Josh Daly II is a 46 y.o. male  presents for congestion. He has had sxs for about 3 days. He has assoc headache. No weakness or numbness. sxs are getting worse. Has tried otc meds but it has not helped. No Known Allergies  Outpatient Prescriptions Marked as Taking for the 10/3/18 encounter (Office Visit) with Gerry Cleaning MD   Medication Sig Dispense Refill    diphenhydrAMINE (BENADRYL ALLERGY) 25 mg tablet Take 25 mg by mouth every six (6) hours as needed for Sleep.  gabapentin (NEURONTIN) 100 mg capsule 1 tab PO TID prn pain 90 Cap 1    diclofenac EC (VOLTAREN) 50 mg EC tablet Take 1 Tab by mouth two (2) times a day. 40 Tab 1    omeprazole (PRILOSEC) 40 mg capsule TAKE ONE CAPSULE BY MOUTH TWICE A DAY  3    cyanocobalamin 1,000 mcg tablet Take 1,000 mcg by mouth daily.  pyridoxine, vitamin B6, (VITAMIN B-6) 100 mg tablet Take 100 mg by mouth daily.  glucosamine/chondr benz A sod (OSTEO BI-FLEX PO) Take  by mouth.  multivitamin (ONE A DAY) tablet Take 1 Tab by mouth daily.  ranitidine (ZANTAC) 150 mg tablet Take 300 mg by mouth two (2) times a day. Patient Active Problem List   Diagnosis Code    Severe obesity (BMI 35.0-39. 9) E66.01    Left lumbar radiculitis M54.16    DDD (degenerative disc disease), lumbar M51.36     Past Medical History:   Diagnosis Date    Arthritis     bilat knees    GERD (gastroesophageal reflux disease)      Social History     Social History    Marital status:      Spouse name: N/A    Number of children: N/A    Years of education: N/A     Social History Main Topics    Smoking status: Never Smoker    Smokeless tobacco: Never Used    Alcohol use No    Drug use: No    Sexual activity: Not Asked     Other Topics Concern    None     Social History Narrative     Family History   Problem Relation Age of Onset    COPD Mother     Neuropathy Mother     No Known Problems Father     No Known Problems Sister     Diabetes Brother  GERD Brother     No Known Problems Brother     No Known Problems Sister         Review of Systems   Constitutional: Negative for chills, fever, malaise/fatigue and weight loss. HENT: Positive for congestion and sore throat. Eyes: Negative for blurred vision. Respiratory: Positive for cough. Negative for shortness of breath and wheezing. Cardiovascular: Negative for chest pain. Gastrointestinal: Negative for nausea and vomiting. Musculoskeletal: Negative for myalgias. Skin: Negative for rash. Neurological: Positive for headaches. Negative for weakness. Vitals:    10/03/18 1503   BP: (!) 140/100   Pulse: 96   Resp: 12   Temp: 98.1 °F (36.7 °C)   TempSrc: Oral   SpO2: 98%   Weight: 200 lb 12.8 oz (91.1 kg)   Height: 5' 3\" (1.6 m)   PainSc:   2   PainLoc: Abdomen       Physical Exam   Constitutional: He is oriented to person, place, and time and well-developed, well-nourished, and in no distress. Neck: Normal range of motion. Neck supple. Cardiovascular: Normal rate, regular rhythm and normal heart sounds. Pulmonary/Chest: Effort normal and breath sounds normal.   Neurological: He is alert and oriented to person, place, and time. Gait normal.   Skin: Skin is warm and dry. Nursing note and vitals reviewed. Assessment/Plan      ICD-10-CM ICD-9-CM    1. Bronchitis J40 490 azithromycin (ZITHROMAX) 250 mg tablet     I have discussed the diagnosis with the patient and the intended plan of care as seen in the above orders. The patient has received an after-visit summary and questions were answered concerning future plans. I have discussed medication, side effects, and warnings with the patient in detail. The patient verbalized understanding and is in agreement with the plan of care. The patient will contact the office with any additional concerns.       Follow-up Disposition:  Return if symptoms worsen or fail to improve.  lab results and schedule of future lab studies reviewed with patient    Ciara Bentley MD

## 2018-10-03 NOTE — PATIENT INSTRUCTIONS
Bronchitis: Care Instructions  Your Care Instructions    Bronchitis is inflammation of the bronchial tubes, which carry air to the lungs. The tubes swell and produce mucus, or phlegm. The mucus and inflamed bronchial tubes make you cough. You may have trouble breathing. Most cases of bronchitis are caused by viruses like those that cause colds. Antibiotics usually do not help and they may be harmful. Bronchitis usually develops rapidly and lasts about 2 to 3 weeks in otherwise healthy people. Follow-up care is a key part of your treatment and safety. Be sure to make and go to all appointments, and call your doctor if you are having problems. It's also a good idea to know your test results and keep a list of the medicines you take. How can you care for yourself at home? · Take all medicines exactly as prescribed. Call your doctor if you think you are having a problem with your medicine. · Get some extra rest.  · Take an over-the-counter pain medicine, such as acetaminophen (Tylenol), ibuprofen (Advil, Motrin), or naproxen (Aleve) to reduce fever and relieve body aches. Read and follow all instructions on the label. · Do not take two or more pain medicines at the same time unless the doctor told you to. Many pain medicines have acetaminophen, which is Tylenol. Too much acetaminophen (Tylenol) can be harmful. · Take an over-the-counter cough medicine that contains dextromethorphan to help quiet a dry, hacking cough so that you can sleep. Avoid cough medicines that have more than one active ingredient. Read and follow all instructions on the label. · Breathe moist air from a humidifier, hot shower, or sink filled with hot water. The heat and moisture will thin mucus so you can cough it out. · Do not smoke. Smoking can make bronchitis worse. If you need help quitting, talk to your doctor about stop-smoking programs and medicines. These can increase your chances of quitting for good.   When should you call for help? Call 911 anytime you think you may need emergency care. For example, call if:    · You have severe trouble breathing.    Call your doctor now or seek immediate medical care if:    · You have new or worse trouble breathing.     · You cough up dark brown or bloody mucus (sputum).     · You have a new or higher fever.     · You have a new rash.    Watch closely for changes in your health, and be sure to contact your doctor if:    · You cough more deeply or more often, especially if you notice more mucus or a change in the color of your mucus.     · You are not getting better as expected. Where can you learn more? Go to http://oni-lay.info/. Enter H333 in the search box to learn more about \"Bronchitis: Care Instructions. \"  Current as of: December 6, 2017  Content Version: 11.7  © 3070-3833 [employer]. Care instructions adapted under license by [employer] (which disclaims liability or warranty for this information). If you have questions about a medical condition or this instruction, always ask your healthcare professional. [address] any warranty or liability for your use of this information.

## 2018-10-10 NOTE — PROCEDURES
[email or web address]  [phone]    HIGH RESOLUTION ESOPHAGEAL MANOMETRY REPORT      Indication:   1. GERD K 21.9    Date of procedure: 09/27/2018    Procedure:     After confirmation of potential allergies, a topical analgesic was used to numb the nares followed by trans-nasal insertion of a High Resolution Manometry Catheter. Pressure bands of both UES and LES were observed on the color contour. Patient instructed to take deep breath to verify placement of catheter; diaphragmatic pinch noted on inspiration. Patient was assisted to supine position and catheter was stabilized by taping at the nares. Patient wasencouraged to relax while acclimating to catheter for approximately 5 minutes. A 30 second baseline pressure was obtained to identify the UES and LES followed by a series of wet swallows using 5 ccs room temperature normal saline to assess esophageal motility and bolus transit. At the end of the study, a multiple rapid swallow sequence was performed. At the conclusion of the procedure; the catheter was removed. Findings:    1. Esophago-Gastric junction: There is complete swallow induced relaxation of the Esophago-gastric junction. The mean IRP is 4.2 (normal is less than 15). 2. Esophageal body: The body contractions are not completely normal. 7/10 swallows show normal intact contraction patterns and normal peristaltic vigour. 3/10 contractions are ineffective with low DCI. There is also a band of pressurization that immediately follows the opening of LES. This pressure band seems to go across beyond the esophagus in to the abdominal wall-raises the possibility of rumination syndrome    3. Impedance: There is complete bolus clearance on most of the swallows      Impression:  1. Borderline High resolution esophageal motility study-some of the swallows are weak  2. Complete clearance of the bolus indicating adequate bolus transit.   3. ? Possible rumination syndrome    Reference: (Interpretation is based on [place] classification, version 3.0). The [place] Classification of esophageal motility disorders, v3.0. Dean Antonio et.al. International High Resolution Manometry Working Group. Neurogastroenterol Motil. 2015;27(2):160-74.

## 2018-10-30 ENCOUNTER — OFFICE VISIT (OUTPATIENT)
Dept: FAMILY MEDICINE CLINIC | Age: 48
End: 2018-10-30

## 2018-10-30 VITALS
BODY MASS INDEX: 35.97 KG/M2 | TEMPERATURE: 98 F | HEIGHT: 63 IN | WEIGHT: 203 LBS | SYSTOLIC BLOOD PRESSURE: 158 MMHG | RESPIRATION RATE: 14 BRPM | OXYGEN SATURATION: 95 % | HEART RATE: 89 BPM | DIASTOLIC BLOOD PRESSURE: 102 MMHG

## 2018-10-30 DIAGNOSIS — I10 HYPERTENSION, UNCONTROLLED: Primary | ICD-10-CM

## 2018-10-30 DIAGNOSIS — G44.029 CHRONIC CLUSTER HEADACHE, NOT INTRACTABLE: ICD-10-CM

## 2018-10-30 RX ORDER — AMLODIPINE AND BENAZEPRIL HYDROCHLORIDE 5; 20 MG/1; MG/1
1 CAPSULE ORAL DAILY
Qty: 30 CAP | Refills: 2 | Status: SHIPPED | OUTPATIENT
Start: 2018-10-30 | End: 2019-01-17 | Stop reason: SDUPTHER

## 2018-10-30 NOTE — PROGRESS NOTES
Chief Complaint   Patient presents with    Headache     Pt states he has been having HAs for about 3 weeks now. They come and go and have progressively gotten worse. Pain is in the back of his head. Denies any SOB, dizziness, chest pain, or fainting. Has been taking about eight 500 mg Tylenol a day. 1. Have you been to the ER, urgent care clinic since your last visit? Hospitalized since your last visit? No    2. Have you seen or consulted any other health care providers outside of the [address] [place] [address] since your last visit? Include any pap smears or colon screening.  no

## 2018-10-30 NOTE — PROGRESS NOTES
Radha Fraire II is a 46 y.o. male  presents for headaches. He has had them for about 2 weeks. No fever or chills. No chest pains or SOB weakness or numbness. No Known Allergies  Outpatient Medications Marked as Taking for the 10/30/18 encounter (Office Visit) with Reggie Aguirre MD   Medication Sig Dispense Refill    naproxen sodium (ALEVE) 220 mg cap Take  by mouth.  diphenhydrAMINE (BENADRYL ALLERGY) 25 mg tablet Take 25 mg by mouth every six (6) hours as needed for Sleep.  gabapentin (NEURONTIN) 100 mg capsule 1 tab PO TID prn pain 90 Cap 1    omeprazole (PRILOSEC) 40 mg capsule TAKE ONE CAPSULE BY MOUTH TWICE A DAY  3    cyanocobalamin 1,000 mcg tablet Take 1,000 mcg by mouth daily.  pyridoxine, vitamin B6, (VITAMIN B-6) 100 mg tablet Take 100 mg by mouth daily.  glucosamine/chondr benz A sod (OSTEO BI-FLEX PO) Take  by mouth.  multivitamin (ONE A DAY) tablet Take 1 Tab by mouth daily.  ranitidine (ZANTAC) 150 mg tablet Take 300 mg by mouth two (2) times a day. Patient Active Problem List   Diagnosis Code    Severe obesity (BMI 35.0-39. 9) E66.01    Left lumbar radiculitis M54.16    DDD (degenerative disc disease), lumbar M51.36     Past Medical History:   Diagnosis Date    Arthritis     bilat knees    GERD (gastroesophageal reflux disease)      Social History     Socioeconomic History    Marital status:      Spouse name: Not on file    Number of children: Not on file    Years of education: Not on file    Highest education level: Not on file   Social Needs    Financial resource strain: Not on file    Food insecurity - worry: Not on file    Food insecurity - inability: Not on file   [employer] needs - medical: Not on file   [employer] needs - non-medical: Not on file   Occupational History    Not on file   Tobacco Use    Smoking status: Never Smoker    Smokeless tobacco: Never Used   Substance and Sexual Activity    Alcohol use: No    Drug use: No    Sexual activity: Not on file   Other Topics Concern    Not on file   Social History Narrative    Not on file     Family History   Problem Relation Age of Onset    COPD Mother     Neuropathy Mother     No Known Problems Father     No Known Problems Sister     Diabetes Brother     GERD Brother     No Known Problems Brother     No Known Problems Sister         Review of Systems   Constitutional: Negative for chills, diaphoresis, fever, malaise/fatigue and weight loss. Eyes: Negative for blurred vision. Respiratory: Negative for shortness of breath and wheezing. Cardiovascular: Negative for chest pain, palpitations and leg swelling. Gastrointestinal: Negative for nausea and vomiting. Genitourinary: Negative. Musculoskeletal: Negative for myalgias. Skin: Negative for rash. Neurological: Positive for headaches. Negative for dizziness, tingling, tremors, sensory change, speech change, focal weakness and weakness. Psychiatric/Behavioral: Negative. Vitals:    10/30/18 1419   BP: (!) 158/102   Pulse: 89   Resp: 14   Temp: 98 °F (36.7 °C)   SpO2: 95%   Weight: 203 lb (92.1 kg)   Height: 5' 3\" (1.6 m)   PainSc:   8   PainLoc: Head       Physical Exam   Constitutional: He is oriented to person, place, and time and well-developed, well-nourished, and in no distress. Neck: Normal range of motion. Neck supple. No thyromegaly present. Cardiovascular: Normal rate, regular rhythm and normal heart sounds. Pulmonary/Chest: Effort normal and breath sounds normal.   Musculoskeletal: Normal range of motion. Neurological: He is alert and oriented to person, place, and time. Gait normal.   Skin: Skin is warm and dry. Psychiatric: Mood, memory, affect and judgment normal.   Nursing note and vitals reviewed. Assessment/Plan      ICD-10-CM ICD-9-CM    1. Hypertension, uncontrolled I10 401.9 amLODIPine-benazepril (LOTREL) 5-20 mg per capsule   2.  Chronic cluster headache, not intractable G44.029 339.02      I have discussed the diagnosis with the patient and the intended plan of care as seen in the above orders. The patient has received an after-visit summary and questions were answered concerning future plans. I have discussed medication, side effects, and warnings with the patient in detail. The patient verbalized understanding and is in agreement with the plan of care. The patient will contact the office with any additional concerns. Follow-up Disposition:  Return in about 1 week (around 11/6/2018).   lab results and schedule of future lab studies reviewed with patient    Lisa Baez MD

## 2018-10-30 NOTE — PATIENT INSTRUCTIONS
High Blood Pressure: Care Instructions  Your Care Instructions    If your blood pressure is usually above 130/80, you have high blood pressure, or hypertension. That means the top number is 130 or higher or the bottom number is 80 or higher, or both. Despite what a lot of people think, high blood pressure usually doesn't cause headaches or make you feel dizzy or lightheaded. It usually has no symptoms. But it does increase your risk for heart attack, stroke, and kidney or eye damage. The higher your blood pressure, the more your risk increases. Your doctor will give you a goal for your blood pressure. Your goal will be based on your health and your age. Lifestyle changes, such as eating healthy and being active, are always important to help lower blood pressure. You might also take medicine to reach your blood pressure goal.  Follow-up care is a key part of your treatment and safety. Be sure to make and go to all appointments, and call your doctor if you are having problems. It's also a good idea to know your test results and keep a list of the medicines you take. How can you care for yourself at home? Medical treatment  · If you stop taking your medicine, your blood pressure will go back up. You may take one or more types of medicine to lower your blood pressure. Be safe with medicines. Take your medicine exactly as prescribed. Call your doctor if you think you are having a problem with your medicine. · Talk to your doctor before you start taking aspirin every day. Aspirin can help certain people lower their risk of a heart attack or stroke. But taking aspirin isn't right for everyone, because it can cause serious bleeding. · See your doctor regularly. You may need to see the doctor more often at first or until your blood pressure comes down. · If you are taking blood pressure medicine, talk to your doctor before you take decongestants or anti-inflammatory medicine, such as ibuprofen.  Some of these medicines can raise blood pressure. · Learn how to check your blood pressure at home. Lifestyle changes  · Stay at a healthy weight. This is especially important if you put on weight around the waist. Losing even 10 pounds can help you lower your blood pressure. · If your doctor recommends it, get more exercise. Walking is a good choice. Bit by bit, increase the amount you walk every day. Try for at least 30 minutes on most days of the week. You also may want to swim, bike, or do other activities. · Avoid or limit alcohol. Talk to your doctor about whether you can drink any alcohol. · Try to limit how much sodium you eat to less than 2,300 milligrams (mg) a day. Your doctor may ask you to try to eat less than 1,500 mg a day. · Eat plenty of fruits (such as bananas and oranges), vegetables, legumes, whole grains, and low-fat dairy products. · Lower the amount of saturated fat in your diet. Saturated fat is found in animal products such as milk, cheese, and meat. Limiting these foods may help you lose weight and also lower your risk for heart disease. · Do not smoke. Smoking increases your risk for heart attack and stroke. If you need help quitting, talk to your doctor about stop-smoking programs and medicines. These can increase your chances of quitting for good. When should you call for help? Call 911 anytime you think you may need emergency care. This may mean having symptoms that suggest that your blood pressure is causing a serious heart or blood vessel problem. Your blood pressure may be over 180/120.   For example, call 911 if:    · You have symptoms of a heart attack. These may include:  ? Chest pain or pressure, or a strange feeling in the chest.  ? Sweating. ? Shortness of breath. ? Nausea or vomiting. ? Pain, pressure, or a strange feeling in the back, neck, jaw, or upper belly or in one or both shoulders or arms. ? Lightheadedness or sudden weakness.   ? A fast or irregular heartbeat.     · You have symptoms of a stroke. These may include:  ? Sudden numbness, tingling, weakness, or loss of movement in your face, arm, or leg, especially on only one side of your body. ? Sudden vision changes. ? Sudden trouble speaking. ? Sudden confusion or trouble understanding simple statements. ? Sudden problems with walking or balance. ? A sudden, severe headache that is different from past headaches.     · You have severe back or belly pain.    Do not wait until your blood pressure comes down on its own. Get help right away.   Call your doctor now or seek immediate care if:    · Your blood pressure is much higher than normal (such as 180/120 or higher), but you don't have symptoms.     · You think high blood pressure is causing symptoms, such as:  ? Severe headache.  ? Blurry vision.    Watch closely for changes in your health, and be sure to contact your doctor if:    · Your blood pressure measures higher than your doctor recommends at least 2 times. That means the top number is higher or the bottom number is higher, or both.     · You think you may be having side effects from your blood pressure medicine. Where can you learn more? Go to http://oni-lay.info/. Enter G498 in the search box to learn more about \"High Blood Pressure: Care Instructions. \"  Current as of: December 6, 2017  Content Version: 11.8  © 5939-9248 Healthwise, Incorporated. Care instructions adapted under license by [employer] (which disclaims liability or warranty for this information). If you have questions about a medical condition or this instruction, always ask your healthcare professional. [place] [address] any warranty or liability for your use of this information.

## 2018-11-06 ENCOUNTER — OFFICE VISIT (OUTPATIENT)
Dept: FAMILY MEDICINE CLINIC | Age: 48
End: 2018-11-06

## 2018-11-06 VITALS
TEMPERATURE: 98.7 F | WEIGHT: 203.8 LBS | BODY MASS INDEX: 36.11 KG/M2 | HEART RATE: 84 BPM | SYSTOLIC BLOOD PRESSURE: 156 MMHG | DIASTOLIC BLOOD PRESSURE: 88 MMHG | OXYGEN SATURATION: 98 % | HEIGHT: 63 IN

## 2018-11-06 DIAGNOSIS — E66.01 SEVERE OBESITY WITH BODY MASS INDEX (BMI) OF 35.0 TO 39.9 WITH SERIOUS COMORBIDITY (HCC): ICD-10-CM

## 2018-11-06 DIAGNOSIS — I10 ESSENTIAL HYPERTENSION: Primary | ICD-10-CM

## 2018-11-06 NOTE — PROGRESS NOTES
Patient here for follow up on his blood pressure no concerns. 1. Have you been to the ER, urgent care clinic since your last visit? Hospitalized since your last visit? No    2. Have you seen or consulted any other health care providers outside of the [address] [place] [address] since your last visit? Include any pap smears or colon screening. No    Health Maintenance reviewed - Will discuss with ashly.

## 2018-11-06 NOTE — PROGRESS NOTES
Mulu Faust II is a 46 y.o. male  presents for follow up. No Known Allergies  Outpatient Medications Marked as Taking for the 11/6/18 encounter (Office Visit) with Garry Alonzo MD   Medication Sig Dispense Refill    amLODIPine-benazepril (LOTREL) 5-20 mg per capsule Take 1 Cap by mouth daily. 30 Cap 2    naproxen sodium (ALEVE) 220 mg cap Take  by mouth.  diphenhydrAMINE (BENADRYL ALLERGY) 25 mg tablet Take 25 mg by mouth every six (6) hours as needed for Sleep.  gabapentin (NEURONTIN) 100 mg capsule 1 tab PO TID prn pain 90 Cap 1    omeprazole (PRILOSEC) 40 mg capsule TAKE ONE CAPSULE BY MOUTH TWICE A DAY  3    cyanocobalamin 1,000 mcg tablet Take 1,000 mcg by mouth daily.  pyridoxine, vitamin B6, (VITAMIN B-6) 100 mg tablet Take 100 mg by mouth daily.  glucosamine/chondr benz A sod (OSTEO BI-FLEX PO) Take  by mouth.  multivitamin (ONE A DAY) tablet Take 1 Tab by mouth daily.  ranitidine (ZANTAC) 150 mg tablet Take 300 mg by mouth two (2) times a day. Patient Active Problem List   Diagnosis Code    Severe obesity (BMI 35.0-39. 9) E66.01    Left lumbar radiculitis M54.16    DDD (degenerative disc disease), lumbar M51.36    Chronic cluster headache, not intractable G44.029    Hypertension, uncontrolled I10     Past Medical History:   Diagnosis Date    Arthritis     bilat knees    GERD (gastroesophageal reflux disease)      Social History     Socioeconomic History    Marital status:      Spouse name: Not on file    Number of children: Not on file    Years of education: Not on file    Highest education level: Not on file   Social Needs    Financial resource strain: Not on file    Food insecurity - worry: Not on file    Food insecurity - inability: Not on file   [employer] needs - medical: Not on file   [employer] needs - non-medical: Not on file   Occupational History    Not on file   Tobacco Use    Smoking status: Never Smoker    Smokeless tobacco: Never Used   Substance and Sexual Activity    Alcohol use: No    Drug use: No    Sexual activity: Not on file   Other Topics Concern    Not on file   Social History Narrative    Not on file     Family History   Problem Relation Age of Onset    COPD Mother     Neuropathy Mother     No Known Problems Father     No Known Problems Sister     Diabetes Brother     GERD Brother     No Known Problems Brother     No Known Problems Sister         Review of Systems   Constitutional: Negative for chills, fever, malaise/fatigue and weight loss. Eyes: Negative for blurred vision. Respiratory: Negative for shortness of breath and wheezing. Cardiovascular: Negative for chest pain. Gastrointestinal: Negative for nausea and vomiting. Musculoskeletal: Negative for myalgias. Skin: Negative for rash. Neurological: Negative for weakness. Vitals:    11/06/18 1434   BP: 156/88   Pulse: 84   Temp: 98.7 °F (37.1 °C)   TempSrc: Oral   SpO2: 98%   Weight: 203 lb 12.8 oz (92.4 kg)   Height: 5' 3\" (1.6 m)   PainSc:   0 - No pain       Physical Exam   Constitutional: He is oriented to person, place, and time and well-developed, well-nourished, and in no distress. Neck: Normal range of motion. Neck supple. No thyromegaly present. Cardiovascular: Normal rate, regular rhythm and normal heart sounds. Pulmonary/Chest: Effort normal and breath sounds normal.   Musculoskeletal: Normal range of motion. Neurological: He is alert and oriented to person, place, and time. Skin: Skin is warm and dry. Nursing note and vitals reviewed. Assessment/Plan      ICD-10-CM ICD-9-CM    1. Essential hypertension I10 401.9    2. Severe obesity with body mass index (BMI) of 35.0 to 39.9 with serious comorbidity (HCC) E66.01 278.01      BP much better control. Will continue same meds    I have discussed the diagnosis with the patient and the intended plan of care as seen in the above orders.  The patient has received an after-visit summary and questions were answered concerning future plans. I have discussed medication, side effects, and warnings with the patient in detail. The patient verbalized understanding and is in agreement with the plan of care. The patient will contact the office with any additional concerns. Follow-up Disposition:  Return in about 3 months (around 2/6/2019).   lab results and schedule of future lab studies reviewed with patient    Reynaldo Guerrero MD

## 2018-11-06 NOTE — PATIENT INSTRUCTIONS
Learning About High Blood Pressure  What is high blood pressure? Blood pressure is a measure of how hard the blood pushes against the walls of your arteries. It's normal for blood pressure to go up and down throughout the day, but if it stays up, you have high blood pressure. Another name for high blood pressure is hypertension. Two numbers tell you your blood pressure. The first number is the systolic pressure. It shows how hard the blood pushes when your heart is pumping. The second number is the diastolic pressure. It shows how hard the blood pushes between heartbeats, when your heart is relaxed and filling with blood. A blood pressure of less than 120/80 (say \"120 over 80\") is ideal for an adult. High blood pressure is 130/80 or higher. You have high blood pressure if your top number is 130 or higher or your bottom number is 80 or higher, or both. What happens when you have high blood pressure? · Blood flows through your arteries with too much force. Over time, this damages the walls of your arteries. But you can't feel it. High blood pressure usually doesn't cause symptoms. · Fat and calcium start to build up in your arteries. This buildup is called plaque. Plaque makes your arteries narrower and stiffer. Blood can't flow through them as easily. · This lack of good blood flow starts to damage some of the organs in your body. This can lead to problems such as coronary artery disease and heart attack, heart failure, stroke, kidney failure, and eye damage. How can you prevent high blood pressure? · Stay at a healthy weight. · Try to limit how much sodium you eat to less than 2,300 milligrams (mg) a day. If you limit your sodium to 1,500 mg a day, you can lower your blood pressure even more. ? Buy foods that are labeled \"unsalted,\" \"sodium-free,\" or \"low-sodium. \" Foods labeled \"reduced-sodium\" and \"light sodium\" may still have too much sodium. ?  Flavor your food with garlic, lemon juice, onion, vinegar, herbs, and spices instead of salt. Do not use soy sauce, steak sauce, onion salt, garlic salt, mustard, or ketchup on your food. ? Use less salt (or none) when recipes call for it. You can often use half the salt a recipe calls for without losing flavor. · Be physically active. Get at least 30 minutes of exercise on most days of the week. Walking is a good choice. You also may want to do other activities, such as running, swimming, cycling, or playing tennis or team sports. · Limit alcohol to 2 drinks a day for men and 1 drink a day for women. · Eat plenty of fruits, vegetables, and low-fat dairy products. Eat less saturated and total fats. How is high blood pressure treated? · Your doctor will suggest making lifestyle changes. For example, your doctor may ask you to eat healthy foods, quit smoking, lose extra weight, and be more active. · If lifestyle changes don't help enough, your doctor may recommend that you take medicine. · When blood pressure is very high, medicines are needed to lower it. Follow-up care is a key part of your treatment and safety. Be sure to make and go to all appointments, and call your doctor if you are having problems. It's also a good idea to know your test results and keep a list of the medicines you take. Where can you learn more? Go to http://oni-lay.info/. Enter P501 in the search box to learn more about \"Learning About High Blood Pressure. \"  Current as of: December 6, 2017  Content Version: 11.8  © 1386-6702 Healthwise, Incorporated. Care instructions adapted under license by [employer] (which disclaims liability or warranty for this information). If you have questions about a medical condition or this instruction, always ask your healthcare professional. [address] any warranty or liability for your use of this information.

## 2018-12-20 ENCOUNTER — OFFICE VISIT (OUTPATIENT)
Dept: FAMILY MEDICINE CLINIC | Age: 48
End: 2018-12-20

## 2018-12-20 VITALS
SYSTOLIC BLOOD PRESSURE: 120 MMHG | HEIGHT: 63 IN | RESPIRATION RATE: 14 BRPM | HEART RATE: 84 BPM | TEMPERATURE: 98.4 F | DIASTOLIC BLOOD PRESSURE: 82 MMHG | WEIGHT: 206 LBS | OXYGEN SATURATION: 95 % | BODY MASS INDEX: 36.5 KG/M2

## 2018-12-20 DIAGNOSIS — G62.9 NEUROPATHY: Primary | ICD-10-CM

## 2018-12-20 NOTE — PROGRESS NOTES
Bernard Benjamin II is a 46 y.o. male  presents for left sided pain and tingling in left upper ext. No trauma or injury. He was working last week and looking up a lot. No weakness or numbness. No Known Allergies  Outpatient Medications Marked as Taking for the 12/20/18 encounter (Office Visit) with Helen Garcia MD   Medication Sig Dispense Refill    amLODIPine-benazepril (LOTREL) 5-20 mg per capsule Take 1 Cap by mouth daily. 30 Cap 2    naproxen sodium (ALEVE) 220 mg cap Take  by mouth.  diphenhydrAMINE (BENADRYL ALLERGY) 25 mg tablet Take 25 mg by mouth every six (6) hours as needed for Sleep.  gabapentin (NEURONTIN) 100 mg capsule 1 tab PO TID prn pain 90 Cap 1    omeprazole (PRILOSEC) 40 mg capsule TAKE ONE CAPSULE BY MOUTH TWICE A DAY  3    cyanocobalamin 1,000 mcg tablet Take 1,000 mcg by mouth daily.  pyridoxine, vitamin B6, (VITAMIN B-6) 100 mg tablet Take 100 mg by mouth daily.  glucosamine/chondr benz A sod (OSTEO BI-FLEX PO) Take  by mouth.  multivitamin (ONE A DAY) tablet Take 1 Tab by mouth daily.  ranitidine (ZANTAC) 150 mg tablet Take 300 mg by mouth two (2) times a day. Patient Active Problem List   Diagnosis Code    Severe obesity (BMI 35.0-39. 9) E66.01    Left lumbar radiculitis M54.16    DDD (degenerative disc disease), lumbar M51.36    Chronic cluster headache, not intractable G44.029    Hypertension, uncontrolled I10     Past Medical History:   Diagnosis Date    Arthritis     bilat knees    GERD (gastroesophageal reflux disease)      Social History     Socioeconomic History    Marital status:      Spouse name: Not on file    Number of children: Not on file    Years of education: Not on file    Highest education level: Not on file   Tobacco Use    Smoking status: Never Smoker    Smokeless tobacco: Never Used   Substance and Sexual Activity    Alcohol use: No    Drug use: No     Family History   Problem Relation Age of Onset  COPD Mother     Neuropathy Mother     No Known Problems Father     No Known Problems Sister     Diabetes Brother     GERD Brother     No Known Problems Brother     No Known Problems Sister         Review of Systems   Constitutional: Negative for chills and fever. Neurological: Positive for tingling. Negative for dizziness, tremors, sensory change, speech change, focal weakness, seizures, loss of consciousness and headaches. Vitals:    12/20/18 1430   BP: 120/82   Pulse: 84   Resp: 14   Temp: 98.4 °F (36.9 °C)   TempSrc: Oral   SpO2: 95%   Weight: 206 lb (93.4 kg)   Height: 5' 3\" (1.6 m)   PainSc:   4   PainLoc: Arm       Physical Exam   Constitutional: He is oriented to person, place, and time and well-developed, well-nourished, and in no distress. Cardiovascular: Normal rate, regular rhythm and normal heart sounds. Pulmonary/Chest: Effort normal and breath sounds normal.   Musculoskeletal: He exhibits tenderness. He exhibits no edema or deformity. Neurological: He is alert and oriented to person, place, and time. Gait normal.   Skin: Skin is warm and dry. Nursing note and vitals reviewed. Assessment/Plan      ICD-10-CM ICD-9-CM    1. Neuropathy G62.9 355.9 REFERRAL TO NEUROLOGY     Will increase neurotin to 2 caps tid prn    I have discussed the diagnosis with the patient and the intended plan of care as seen in the above orders. The patient has received an after-visit summary and questions were answered concerning future plans. I have discussed medication, side effects, and warnings with the patient in detail. The patient verbalized understanding and is in agreement with the plan of care. The patient will contact the office with any additional concerns. Follow-up Disposition:  Return in about 1 month (around 1/20/2019).   lab results and schedule of future lab studies reviewed with patient    Celio Calloway MD

## 2018-12-20 NOTE — PATIENT INSTRUCTIONS
Neuropathic Pain: Care Instructions  Your Care Instructions    Neuropathic pain is caused by pressure on or damage to your nerves. It's often simply called nerve pain. Some people feel this type of pain all the time. For others, it comes and goes. Diabetes, shingles, or an injury can cause nerve pain. Many people say the pain feels sharp, burning, or stabbing. But some people feel it as a dull ache. In some cases, it makes your skin very sensitive. So touch, pressure, and other sensations that did not hurt before may now cause pain. It's important to know that this kind of pain is real and can affect your quality of life. It's also important to know that treatment can help. Treatment includes pain medicines, exercise, and physical therapy. Medicines can help reduce the number of pain signals that travel over the nerves. This can make the painful areas less sensitive. It can also help you sleep better and improve your mood. But medicines are only one part of successful treatment. Most people do best with more than one kind of treatment. Your doctor may recommend that you try cognitive-behavioral therapy and stress management. Or, if needed, you may decide to try to quit smoking, lower your blood pressure, or better control blood sugar. These kinds of healthy changes can also make a difference. If you feel that your treatment is not working, talk to your doctor. And be sure to tell your doctor if you think you might be depressed or anxious. These are common problems that can also be treated. Follow-up care is a key part of your treatment and safety. Be sure to make and go to all appointments, and call your doctor if you are having problems. It's also a good idea to know your test results and keep a list of the medicines you take. How can you care for yourself at home? · Be safe with medicines. Read and follow all instructions on the label.   ? If the doctor gave you a prescription medicine for pain, take it as prescribed. ? If you are not taking a prescription pain medicine, ask your doctor if you can take an over-the-counter medicine. · Save hard tasks for days when you have less pain. Follow a hard task with an easy task. And remember to take breaks. · Relax, and reduce stress. You may want to try deep breathing or meditation. These can help. · Keep moving. Gentle, daily exercise can help reduce pain. Your doctor or physical therapist can tell you what type of exercise is best for you. This may include walking, swimming, and stationary biking. It may also include stretches and range-of-motion exercises. · Try heat, cold packs, and massage. · Get enough sleep. Constant pain can make you more tired. If the pain makes it hard to sleep, talk with your doctor. · Think positively. Your thoughts can affect your pain. Do fun things to distract yourself from the pain. See a movie, read a book, listen to music, or spend time with a friend. · Keep a pain diary. Try to write down how strong your pain is and what it feels like. Also try to notice and write down how your moods, thoughts, sleep, activities, and medicine affect your pain. These notes can help you and your doctor find the best ways to treat your pain. Reducing constipation caused by pain medicine  Pain medicines often cause constipation. To reduce constipation:  · Include fruits, vegetables, beans, and whole grains in your diet each day. These foods are high in fiber. · Drink plenty of fluids, enough so that your urine is light yellow or clear like water. If you have kidney, heart, or liver disease and have to limit fluids, talk with your doctor before you increase the amount of fluids you drink. · Get some exercise every day. Build up slowly to 30 to 60 minutes a day on 5 or more days of the week. · Take a fiber supplement, such as Citrucel or Metamucil, every day if needed. Read and follow all instructions on the label.   · Schedule time each day for a bowel movement. Having a daily routine may help. Take your time and do not strain when having a bowel movement. · Ask your doctor about a laxative. The goal is to have one easy bowel movement every 1 to 2 days. Do not let constipation go untreated for more than 3 days. When should you call for help? Call your doctor now or seek immediate medical care if:    · You feel sad, anxious, or hopeless for more than a few days. This could mean you are depressed. Depression is common in people who have a lot of pain. But it can be treated.     · You have trouble with bowel movements, such as:  ? No bowel movement in 3 days. ? Blood in the anal area, in your stool, or on the toilet paper. ? Diarrhea for more than 24 hours.    Watch closely for changes in your health, and be sure to contact your doctor if:    · Your pain is getting worse.     · You can't sleep because of pain.     · You are very worried or anxious about your pain.     · You have trouble taking your pain medicine.     · You have any concerns about your pain medicine or its side effects.     · You have vomiting or cramps for more than 2 hours. Where can you learn more? Go to http://oni-lay.info/. Enter M870 in the search box to learn more about \"Neuropathic Pain: Care Instructions. \"  Current as of: June 4, 2018  Content Version: 11.8  © 4371-4239 Healthwise, Incorporated. Care instructions adapted under license by [employer] (which disclaims liability or warranty for this information). If you have questions about a medical condition or this instruction, always ask your healthcare professional. [place] [address] any warranty or liability for your use of this information.

## 2018-12-20 NOTE — PROGRESS NOTES
Unruly Marx II 46 y.o. male being seen for   Chief Complaint   Patient presents with    Arm Pain     left arm     Pinched nerve between C5 and C7   \"Looking up constantly at work may have irritated the nerve? \" now pain in left arm and can not open left hand (can close it / squeeze)   Some tingling in left hand     1. Have you been to the ER, urgent care clinic since your last visit? Hospitalized since your last visit? No    2. Have you seen or consulted any other health care providers outside of the [facility] since your last visit? Include any pap smears or colon screening.  No    Pharmacy has been verified  Care team has been verified/updated

## 2019-01-04 ENCOUNTER — OFFICE VISIT (OUTPATIENT)
Dept: ORTHOPEDIC SURGERY | Age: 49
End: 2019-01-04

## 2019-01-04 VITALS
OXYGEN SATURATION: 97 % | SYSTOLIC BLOOD PRESSURE: 128 MMHG | DIASTOLIC BLOOD PRESSURE: 83 MMHG | BODY MASS INDEX: 37.07 KG/M2 | WEIGHT: 209.2 LBS | HEIGHT: 63 IN | TEMPERATURE: 97.7 F | HEART RATE: 65 BPM | RESPIRATION RATE: 16 BRPM

## 2019-01-04 DIAGNOSIS — M54.2 NECK PAIN: ICD-10-CM

## 2019-01-04 DIAGNOSIS — G95.9 CERVICAL MYELOPATHY (HCC): Primary | ICD-10-CM

## 2019-01-04 DIAGNOSIS — M54.12 CERVICAL RADICULOPATHY: ICD-10-CM

## 2019-01-04 NOTE — PROGRESS NOTES
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 Worker's Compensation and legal considerations: none filed. Vitals:    01/04/19 1316   BP: 128/83   Pulse: 65   Resp: 16   Temp: 97.7 °F (36.5 °C)   SpO2: 97%   Weight: 209 lb 3.2 oz (94.9 kg)   Height: 5' 3\" (1.6 m)   PainSc:   0 - No pain   PainLoc: Hand           Chief Complaint   Patient presents with    Hand Pain     Left     New Patient         HPI: Patient comes in today with a 2-month history of difficulty straightening his fingers on the left as well as pain and tingling down his arm from his neck. He reports a remote history of cervical spine disease in 2007 for which she had a C3-C4 herniated disc. He has recently seen in the spine center for lumbar spine problems. He denies any injury prior to this recent onset of symptoms. His main complaint is that he cannot straighten out his fingers. He is able to extend his wrist he says. Date of onset: November 2018    Injury: No    Prior Treatment:  No    Numbness/ Tingling: Yes: Comment: tingling down neck, arm, and to hands    ROS: Review of Systems - General ROS: negative  Respiratory ROS: no cough, shortness of breath, or wheezing  Cardiovascular ROS: no chest pain or dyspnea on exertion  Musculoskeletal ROS: positive for - pain in arm - left, hand - left and neck - left  Neurological ROS: positive for - numbness/tingling  Dermatological ROS: negative    Past Medical History:   Diagnosis Date    Arthritis     bilat knees    GERD (gastroesophageal reflux disease)        Past Surgical History:   Procedure Laterality Date    FULL ESOPHAGEAL MANOMETRY  10/10/2018         HX HERNIA REPAIR  1992       Current Outpatient Medications   Medication Sig Dispense Refill    amLODIPine-benazepril (LOTREL) 5-20 mg per capsule Take 1 Cap by mouth daily. 30 Cap 2    naproxen sodium (ALEVE) 220 mg cap Take  by mouth.       diphenhydrAMINE (BENADRYL ALLERGY) 25 mg tablet Take 25 mg by mouth every six (6) hours as needed for Sleep.  gabapentin (NEURONTIN) 100 mg capsule 1 tab PO TID prn pain (Patient taking differently: 200 mg daily. 1 tab PO TID prn pain) 90 Cap 1    omeprazole (PRILOSEC) 40 mg capsule TAKE ONE CAPSULE BY MOUTH TWICE A DAY  3    cyanocobalamin 1,000 mcg tablet Take 1,000 mcg by mouth daily.  glucosamine/chondr benz A sod (OSTEO BI-FLEX PO) Take  by mouth.  multivitamin (ONE A DAY) tablet Take 1 Tab by mouth daily.  ranitidine (ZANTAC) 150 mg tablet Take 300 mg by mouth two (2) times a day.  pyridoxine, vitamin B6, (VITAMIN B-6) 100 mg tablet Take 100 mg by mouth daily. No Known Allergies      PE:     NEUROVASCULAR: He has weakness and inability to extend the digits of his left hand. He has extension of his wrist as well as all other motor being intact. He has extension of his thumb as well. There is diminished sensation over the thumb index and middle fingers on the dorsal and volar aspects. There is positive Spurling test on the left that reproduces his symptoms. Examination L R Examination L R   Carpal Comp. - - Pronator Comp. - -   Carpal Tinel - - Pronator Tinel - -   Phalen's - - Pronator Stress - -   Cubital Comp. - - Guyon Comp. - -   Cubital Tinel - - Guyon Tinel - -   Elbow Hyperflexion - - Adson's - -   Spurling's ++ - SC Comp. - -   PCB Median abn - - SC Tinel - -   Radial Tinel - - IC Comp. - -   Digital Tinel - - IC Tinel - -   Radial 2-Pt WNL WNL Ulnar 2-Pt WNL WNL     Radial Pulse: 2+  Capillary Refill: < 2 sec  Francisco: Not Performed  Digital Francisco: Not Performed      Imaging: Plain films of the cervical spine: There appears to be moderate to severe degenerative changes in the cervical spine at the facet joints as well as the main cervical body joints. ICD-10-CM ICD-9-CM    1. Cervical myelopathy (HCC) G95.9 721.1    2. Cervical radiculopathy M54.12 723.4    3.  Neck pain M54.2 723.1 AMB POC XRAY, SPINE, CERVICAL; 2 OR 3       Plan: At this point as this patient is known to Anita Ramirez, I would like him to follow-up with her regarding his likely cervical radiculopathy and/or myelopathy. On physical exam and plain films there is clear evidence that he has a degenerative process in his cervical spine that is compressing his nerve roots. I do not see evidence of a peripheral mononeuropathy such as carpal tunnel syndrome or cubital tunnel syndrome at this time.     Follow-up PRN    Plan was reviewed with patient, who verbalized agreement and understanding of the plan

## 2019-01-04 NOTE — PATIENT INSTRUCTIONS
Cervical Disc Disease: Care Instructions  Your Care Instructions    Cervical disc disease results from damage, disease, or wear and tear to the discs between the bones (vertebra) in your neck. The discs act as shock absorbers for the spine and keep the spine flexible. When a disc is damaged, it can bulge out and press against the nerve roots or spinal cord. This is sometimes called a herniated or \"slipped disc. \" This pressure can cause pain and numbness or tingling in your arms and hands. It can also cause weakness in your legs. An accident can damage a disc and cause it to break open (rupture). Aging and hard physical work can also cause damage to cervical discs. The first treatments for cervical disc disease include physical therapy, special neck exercises, heat, and pain medicine. If these fail, your doctor may inject steroids and pain medicine into your neck. Surgery is usually done only if other treatments have not worked. Follow-up care is a key part of your treatment and safety. Be sure to make and go to all appointments, and call your doctor if you are having problems. It's also a good idea to know your test results and keep a list of the medicines you take. How can you care for yourself at home? · Take pain medicines exactly as directed. ? If the doctor gave you a prescription medicine for pain, take it as prescribed. ? If you are not taking a prescription pain medicine, ask your doctor if you can take an over-the-counter medicine. · Don't spend too long in one position. Take short breaks to move around and change positions. · Wear a seat belt and shoulder harness when you are in a car. · Sleep with a pillow under your head and neck that keeps your neck straight. · Follow your doctor's instructions for gentle neck-stretching exercises. · Do not smoke. Smoking can slow healing of your discs. If you need help quitting, talk to your doctor about stop-smoking programs and medicines.  These can increase your chances of quitting for good. · Avoid strenuous work or exercise until your doctor says it is okay. When should you call for help? Call 911 anytime you think you may need emergency care. For example, call if:    · You are unable to move an arm or a leg at all.   [facility] your doctor now or seek immediate medical care if:    · You have new or worse symptoms in your arms, legs, belly, or buttocks. Symptoms may include:  ? Numbness or tingling. ? Weakness. ? Pain.     · You lose bladder or bowel control.    Watch closely for changes in your health, and be sure to contact your doctor if:    · You do not get better as expected. Where can you learn more? Go to http://oni-lay.info/. Enter N118 in the search box to learn more about \"Cervical Disc Disease: Care Instructions. \"  Current as of: November 29, 2017  Content Version: 11.8  © 0730-3286 [employer]. Care instructions adapted under license by [employer] (which disclaims liability or warranty for this information). If you have questions about a medical condition or this instruction, always ask your healthcare professional. [place] [address] any warranty or liability for your use of this information. Pinched Nerve in the Neck: Care Instructions  Your Care Instructions  A pinched nerve in the neck happens when a vertebra or disc in the upper part of your spine is damaged. This damage can happen because of an injury. Or it can just happen with age. The changes caused by the damage may put pressure on a nearby nerve root, pinching it. This causes symptoms such as sharp pain in your neck, shoulder, arm, hand, or back. You may also have tingling or numbness. Sometimes it makes your arm weaker. The symptoms are usually worse when you turn your head or strain your neck. For many people, the symptoms get better over time and finally go away.   Early treatment usually includes medicines for pain and swelling. Sometimes physical therapy and special exercises may help. Follow-up care is a key part of your treatment and safety. Be sure to make and go to all appointments, and call your doctor if you are having problems. It's also a good idea to know your test results and keep a list of the medicines you take. How can you care for yourself at home? · Be safe with medicines. Read and follow all instructions on the label. ? If the doctor gave you a prescription medicine for pain, take it as prescribed. ? If you are not taking a prescription pain medicine, ask your doctor if you can take an over-the-counter medicine. · Try using a heating pad on a low or medium setting for 15 to 20 minutes every 2 or 3 hours. Try a warm shower in place of one session with the heating pad. You can also buy single-use heat wraps that last up to 8 hours. · You can also try an ice pack for 10 to 15 minutes every 2 to 3 hours. There isn't strong evidence that either heat or ice will help. But you can try them to see if they help you. · Don't spend too long in one position. Take short breaks to move around and change positions. · Wear a seat belt and shoulder harness when you are in a car. · Sleep with a pillow under your head and neck that keeps your neck straight. · If you were given a neck brace (cervical collar) to limit neck motion, wear it as instructed for as many days as your doctor tells you to. Do not wear it longer than you were told to. Wearing a brace for too long can lead to neck stiffness and can weaken the neck muscles. · Follow your doctor's instructions for gentle neck-stretching exercises. · Do not smoke. Smoking can slow healing of your discs. If you need help quitting, talk to your doctor about stop-smoking programs and medicines. These can increase your chances of quitting for good. · Avoid strenuous work or exercise until your doctor says it is okay.   When should you call for help?  Call 911 anytime you think you may need emergency care. For example, call if:    · You are unable to move an arm or a leg at all.   [facility] your doctor now or seek immediate medical care if:    · You have new or worse symptoms in your arms, legs, chest, belly, or buttocks. Symptoms may include:  ? Numbness or tingling. ? Weakness. ? Pain.     · You lose bladder or bowel control.    Watch closely for changes in your health, and be sure to contact your doctor if:    · You are not getting better as expected. Where can you learn more? Go to http://oni-lay.info/. Enter E859 in the search box to learn more about \"Pinched Nerve in the Neck: Care Instructions. \"  Current as of: November 29, 2017  Content Version: 11.8  © 5698-5471 [employer]. Care instructions adapted under license by [employer] (which disclaims liability or warranty for this information). If you have questions about a medical condition or this instruction, always ask your healthcare professional. [address] any warranty or liability for your use of this information.

## 2019-01-17 ENCOUNTER — TELEPHONE (OUTPATIENT)
Dept: FAMILY MEDICINE CLINIC | Age: 49
End: 2019-01-17

## 2019-01-17 DIAGNOSIS — M51.36 DDD (DEGENERATIVE DISC DISEASE), LUMBAR: ICD-10-CM

## 2019-01-17 DIAGNOSIS — M54.16 LEFT LUMBAR RADICULITIS: ICD-10-CM

## 2019-01-17 DIAGNOSIS — I10 HYPERTENSION, UNCONTROLLED: ICD-10-CM

## 2019-01-17 RX ORDER — AMLODIPINE AND BENAZEPRIL HYDROCHLORIDE 5; 20 MG/1; MG/1
CAPSULE ORAL
Qty: 30 CAP | Refills: 2 | Status: SHIPPED | OUTPATIENT
Start: 2019-01-17 | End: 2019-04-12 | Stop reason: SDUPTHER

## 2019-01-17 RX ORDER — GABAPENTIN 100 MG/1
200 CAPSULE ORAL 3 TIMES DAILY
Qty: 90 CAP | Refills: 1 | Status: SHIPPED | OUTPATIENT
Start: 2019-01-17 | End: 2022-03-10

## 2019-01-17 NOTE — TELEPHONE ENCOUNTER
Rita from [facility] pharmacy called requesting a call back in ref to patient RX she needs clarification of his order.  Please call [phone]

## 2019-01-17 NOTE — TELEPHONE ENCOUNTER
Patient wife called the office stating patient was told by Dr. Jose Becerra to increase his Gabapentin 100 mg tablet to twice a day instead of once a day. He is out and needs refills. Has been scheduled to see Dr. Sangeeta Coto later this month.

## 2019-01-17 NOTE — TELEPHONE ENCOUNTER
Spoke with Dr. Ivan Donnelly called [facility] Hearing back she has been told this should be 2 tablets by mouth three times a day.

## 2019-01-17 NOTE — TELEPHONE ENCOUNTER
Called patient had him verify his  he has been told his medication has been sent in to his pharmacy.

## 2019-01-24 ENCOUNTER — OFFICE VISIT (OUTPATIENT)
Dept: ORTHOPEDIC SURGERY | Age: 49
End: 2019-01-24

## 2019-01-24 VITALS
HEIGHT: 63 IN | SYSTOLIC BLOOD PRESSURE: 109 MMHG | WEIGHT: 210 LBS | TEMPERATURE: 98.1 F | HEART RATE: 63 BPM | OXYGEN SATURATION: 97 % | BODY MASS INDEX: 37.21 KG/M2 | RESPIRATION RATE: 16 BRPM | DIASTOLIC BLOOD PRESSURE: 79 MMHG

## 2019-01-24 DIAGNOSIS — M54.12 LEFT CERVICAL RADICULOPATHY: Primary | ICD-10-CM

## 2019-01-24 RX ORDER — METHYLPREDNISOLONE 4 MG/1
TABLET ORAL
Qty: 1 DOSE PACK | Refills: 0 | Status: SHIPPED | OUTPATIENT
Start: 2019-01-24 | End: 2019-03-05 | Stop reason: ALTCHOICE

## 2019-01-24 NOTE — PROGRESS NOTES
[facility] [address]  [address], [address]  [place] [address]  Phone: [phone]  Fax: [phone]        Steven Osborne  : 1970  PCP: Leah Doshi MD    PROGRESS NOTE      ASSESSMENT AND PLAN    Diagnoses and all orders for this visit:    1. Left cervical radiculopathy, partial C7/8//T1 weakness x 2 months  -     MRI CERV SPINE WO CONT; Future  -     topiramate ER (TROKENDI XR) 25 mg capsule; 1 tab po qhs for 1 week then increase to 2 tab po qhs thereafter.  -     methylPREDNISolone (MEDROL DOSEPACK) 4 mg tablet; Use as directed       1. 47yo  w/ hx of prior cervical HNP presenting with L wrist/phalanges extensor and intrinsic weakness x 2 months post overhead activity. R side has recovered. 2. Rx of MDP  3. Trial of Trokendi titrate to 50mg QHS  4. MRI cervical spine - 2 months LUE numbness tingling weakness. May require early decompression  5. DC Gabapentin, unable to titrate beyond 100mg  6. Avoid overhead lifting or reaching. 7. Given information on MRI cervical    Follow-up Disposition:  Return for MRI/CT f/u. HISTORY OF PRESENT ILLNESS  Sal Grimaldo II is a 46 y.o. male. Pt last seen in 2018 for low back pain. Pt seen today referred from Dr. Poncho Butcher for neck pain and hand weakness. Pt had XR cervical spine at Dr. Tamar Wood showing advanced degenerative changes. Pt reports helping at work doing overhead work 2 months ago and injuring his neck. He is a drywall supervisor. He had BUE tingling initially and the RUE resolved fairly quickly. He states he still has LUE tingling through the arm and all fingers. He states he is unable to open his L hand. He has been receiving manual traction at home and massages. He reports an injury in  while in [facility] where he hit his head on a pipe and scrunched his neck. He had tingling in RUE at that time.   He states he was offered surgery at that time with a 40-50% chance of paralyzation, so he declined. He notes he had relief with chiropractic care and traction at that time. He admits to increased dizziness with increased Gabapentin recently. He states he is not sleeping well. Cuca Powers His sciatica pain has not bothered him lately, was doing ok with the Gabapentin 100mg. Location of pain: neck  Does pain radiate into extremities: LLE resolved with Gabapentin. LUE tingling, numbness, weakness x 2 months  Does patient have weakness: LUE x 2 months   Pt denies saddle paresthesias. Medications pt is on: Gabapentin 100mg TID. Dizziness with 200mg. Aleve PRN minimal benefit. Denies persistent fevers, chills, weight changes, neurogenic bowel or bladder symptoms. Treatments patient has tried:  Physical therapy:No  Doing HEP: No  Non-opioid medications: Yes  Spinal injections: Yes in [facility] lumbar with benefit. Spinal surgery- No.      reviewed. PMHx of reflux. Pt works as supervisor as [employer]. Pt is from [place]. Pt's wife is pregnant. Admission 9/27/18 for endoscopy. Pain Assessment  1/24/2019   Location of Pain Neck;Arm   Location Modifiers Left   Severity of Pain 6   Quality of Pain Sharp; Other (Comment)   Quality of Pain Comment Stabbing. Numbness left arm down to fingers   Duration of Pain Persistent   Frequency of Pain Constant   Aggravating Factors Other (Comment)   Aggravating Factors Comment Sitting and seat belt   Limiting Behavior Some   Relieving Factors Other (Comment)   Relieving Factors Comment Hot bath and massage   Result of Injury No   Type of Injury -   Type of Injury Comment -       PAST MEDICAL HISTORY   Past Medical History:   Diagnosis Date    Arthritis     bilat knees    GERD (gastroesophageal reflux disease)     Neck injury about 2007    hit head on pipe, in [place], tx chiro, told he had HNP       Past Surgical History:   Procedure Laterality Date    FULL ESOPHAGEAL MANOMETRY  10/10/2018         HX HERNIA REPAIR  1992   . MEDICATIONS      Current Outpatient Medications   Medication Sig Dispense Refill    acetaminophen (TYLENOL PO) Take  by mouth.  topiramate ER (TROKENDI XR) 25 mg capsule 1 tab po qhs for 1 week then increase to 2 tab po qhs thereafter. 60 Cap 0    methylPREDNISolone (MEDROL DOSEPACK) 4 mg tablet Use as directed 1 Dose Pack 0    amLODIPine-benazepril (LOTREL) 5-20 mg per capsule TAKE 1 CAPSULE BY MOUTH EVERY DAY 30 Cap 2    gabapentin (NEURONTIN) 100 mg capsule Take 2 Caps by mouth three (3) times daily. TAKE 1 TABLET BY MOUTH 3 TIMES A DAY AS NEEDED FOR PAIN 90 Cap 1    naproxen sodium (ALEVE) 220 mg cap Take  by mouth.  diphenhydrAMINE (BENADRYL ALLERGY) 25 mg tablet Take 25 mg by mouth every six (6) hours as needed for Sleep.  omeprazole (PRILOSEC) 40 mg capsule TAKE ONE CAPSULE BY MOUTH TWICE A DAY  3    cyanocobalamin 1,000 mcg tablet Take 1,000 mcg by mouth daily.  pyridoxine, vitamin B6, (VITAMIN B-6) 100 mg tablet Take 100 mg by mouth daily.  glucosamine/chondr benz A sod (OSTEO BI-FLEX PO) Take  by mouth.  multivitamin (ONE A DAY) tablet Take 1 Tab by mouth daily.  ranitidine (ZANTAC) 150 mg tablet Take 300 mg by mouth two (2) times a day.           ALLERGIES  No Known Allergies       SOCIAL HISTORY    Social History     Socioeconomic History    Marital status:      Spouse name: Not on file    Number of children: Not on file    Years of education: Not on file    Highest education level: Not on file   Social Needs    Financial resource strain: Not on file    Food insecurity - worry: Not on file    Food insecurity - inability: Not on file    Transportation needs - medical: Not on file   [employer] needs - non-medical: Not on file   Occupational History    Not on file   Tobacco Use    Smoking status: Never Smoker    Smokeless tobacco: Never Used   Substance and Sexual Activity    Alcohol use: No    Drug use: No    Sexual activity: Not on file Other Topics Concern    Not on file   Social History Narrative    Not on file       FAMILY HISTORY    Family History   Problem Relation Age of Onset    COPD Mother     Neuropathy Mother     No Known Problems Father     No Known Problems Sister     Diabetes Brother     GERD Brother     No Known Problems Brother     No Known Problems Sister        REVIEW OF SYSTEMS  Review of Systems   Constitutional: Negative for chills, fever and weight loss. Respiratory: Negative for shortness of breath. Cardiovascular: Negative for chest pain. Gastrointestinal: Negative for constipation. Negative for fecal incontinence   Genitourinary: Negative for dysuria. Negative for urinary incontinence   Musculoskeletal:        Per HPI   Skin: Negative for rash. Neurological: Positive for tingling and focal weakness. Negative for dizziness, tremors and headaches. Endo/Heme/Allergies: Does not bruise/bleed easily. Psychiatric/Behavioral: The patient does not have insomnia. PHYSICAL EXAMINATION  Visit Vitals  /79   Pulse 63   Temp 98.1 °F (36.7 °C)   Resp 16   Ht 5' 3\" (1.6 m)   Wt 210 lb (95.3 kg)   SpO2 97%   BMI 37.20 kg/m²         Accompanied by spouse. Constitutional:  Well developed, well nourished, in no acute distress. Psychiatric: Affect and mood are appropriate. Integumentary: No rashes or abrasions noted on exposed areas. Cardiovascular/Peripheral Vascular: Intact l pulses. No peripheral edema is noted BLE. Lymphatic:  No evidence of lymphedema. No cervical lymphadenopathy. SPINE/MUSCULOSKELETAL EXAM    Cervical spine:  Neck is midline. Normal muscle tone. No focal atrophy is noted. Tenderness to palpation L upper trapezius. Positive Spurling's sign on L. Negative Tinel's sign. Negative Mcclain's sign. Diminished Sensation to light touch in L dorsal Forearm.       MOTOR:      Biceps  Triceps Deltoids Wrist Ext Wrist Flex Hand Intrin   Right +4/5 +4/5 +4/5 +4/5 +4/5 +4/5   Left +4/5 +4/5 +4/5 3/5 4/5 2/5     Pinch intact    DTRs are hypoactive biceps, triceps, brachioradialis,   DTRs are 2+ patella, and Achilles. No difficulty with tandem gait. Ambulation without assistive device. FWB. Written by Neema Escoto, as dictated by Suzanne Mahoney MD.    I, Dr. Suzanne Mahoney MD, confirm that all documentation is accurate. Mr. Wing Junior may have a reminder for a \"due or due soon\" health maintenance. I have asked that he contact his primary care provider for follow-up on this health maintenance.

## 2019-01-24 NOTE — PROGRESS NOTES
Verbal order entered per Dr. Erma Mathis as documented on blue sheet:  -MDP  -Trokendi XR 25mg 1 tab po qhs for 1 week and then increase to 2 tabs po qhs thereafter. Disp: 61 Rf: 0  -MRI C-Spine w/o cont-2 months of left upper extremity numbness, tingling and weakness.

## 2019-01-28 ENCOUNTER — OFFICE VISIT (OUTPATIENT)
Dept: FAMILY MEDICINE CLINIC | Age: 49
End: 2019-01-28

## 2019-01-28 ENCOUNTER — TELEPHONE (OUTPATIENT)
Dept: ORTHOPEDIC SURGERY | Age: 49
End: 2019-01-28

## 2019-01-28 ENCOUNTER — DOCUMENTATION ONLY (OUTPATIENT)
Dept: ORTHOPEDIC SURGERY | Age: 49
End: 2019-01-28

## 2019-01-28 VITALS
OXYGEN SATURATION: 99 % | HEART RATE: 100 BPM | SYSTOLIC BLOOD PRESSURE: 130 MMHG | HEIGHT: 63 IN | BODY MASS INDEX: 37.21 KG/M2 | DIASTOLIC BLOOD PRESSURE: 80 MMHG | RESPIRATION RATE: 14 BRPM | WEIGHT: 210 LBS | TEMPERATURE: 98.8 F

## 2019-01-28 DIAGNOSIS — L03.115 CELLULITIS OF RIGHT LOWER EXTREMITY: Primary | ICD-10-CM

## 2019-01-28 RX ORDER — AMOXICILLIN AND CLAVULANATE POTASSIUM 875; 125 MG/1; MG/1
1 TABLET, FILM COATED ORAL 2 TIMES DAILY
Qty: 20 TAB | Refills: 0 | Status: SHIPPED | OUTPATIENT
Start: 2019-01-28 | End: 2019-02-07

## 2019-01-28 NOTE — TELEPHONE ENCOUNTER
Called Sydnie and patients  verified. Lorel Duverney was informed of message below by Lucian Hamilton NP \"I would recommend that he take an OTC pepcid 30 min prior to taking an OTC Naproxen 500mg BID for the next week instead\", she verbalized understanding. Lorel Duverney wanted to know does he continue taking the trokendi? She stated patient believes it was the steroids that made him dizzy because it happened one time before when he had taken them. Informed patient he should continue his normal medication regiment if he has anymore symptoms to give us a call back. Lorel Duverney verbalized understanding and no further questions or concerns at this time. Please advise if anything different.

## 2019-01-28 NOTE — PROGRESS NOTES
Order and office notes have been faxed to [facility] Scheduling to have them call patient to set up MRI C-Spine. They will authorize with the insurance if needed. Patient aware.

## 2019-01-28 NOTE — PATIENT INSTRUCTIONS

## 2019-01-28 NOTE — TELEPHONE ENCOUNTER
Charlotte Barrios [phone] on HIPAA    Patient was having dizzyness after taking the first day of Medrol Dose Pack on Saturday so he stopped taking it. He has had the same symptoms in the past taking the same medication.     Please call

## 2019-01-28 NOTE — PROGRESS NOTES
Sigrid Gomez II is a 46 y.o. male  presents for lesion on right upper thigh. He has had it for several days. It is getting bigger. He has assoc tenderness no fever or chills. No drainage. No Known Allergies  Outpatient Medications Marked as Taking for the 1/28/19 encounter (Office Visit) with Ethel Collins MD   Medication Sig Dispense Refill    acetaminophen (TYLENOL PO) Take  by mouth.  topiramate ER (TROKENDI XR) 25 mg capsule 1 tab po qhs for 1 week then increase to 2 tab po qhs thereafter. 60 Cap 0    methylPREDNISolone (MEDROL DOSEPACK) 4 mg tablet Use as directed 1 Dose Pack 0    amLODIPine-benazepril (LOTREL) 5-20 mg per capsule TAKE 1 CAPSULE BY MOUTH EVERY DAY 30 Cap 2    gabapentin (NEURONTIN) 100 mg capsule Take 2 Caps by mouth three (3) times daily. TAKE 1 TABLET BY MOUTH 3 TIMES A DAY AS NEEDED FOR PAIN 90 Cap 1    naproxen sodium (ALEVE) 220 mg cap Take  by mouth.  diphenhydrAMINE (BENADRYL ALLERGY) 25 mg tablet Take 25 mg by mouth every six (6) hours as needed for Sleep.  omeprazole (PRILOSEC) 40 mg capsule TAKE ONE CAPSULE BY MOUTH TWICE A DAY  3    cyanocobalamin 1,000 mcg tablet Take 1,000 mcg by mouth daily.  pyridoxine, vitamin B6, (VITAMIN B-6) 100 mg tablet Take 100 mg by mouth daily.  glucosamine/chondr benz A sod (OSTEO BI-FLEX PO) Take  by mouth.  multivitamin (ONE A DAY) tablet Take 1 Tab by mouth daily.  ranitidine (ZANTAC) 150 mg tablet Take 300 mg by mouth two (2) times a day. Patient Active Problem List   Diagnosis Code    Severe obesity (BMI 35.0-39. 9) E66.01    Left lumbar radiculitis M54.16    DDD (degenerative disc disease), lumbar M51.36    Chronic cluster headache, not intractable G44.029    Hypertension, uncontrolled I10     Past Medical History:   Diagnosis Date    Arthritis     bilat knees    GERD (gastroesophageal reflux disease)     Neck injury about 2007    hit head on pipe, in [place], [place] chiro, told he had HNP Social History     Socioeconomic History    Marital status:      Spouse name: Not on file    Number of children: Not on file    Years of education: Not on file    Highest education level: Not on file   Tobacco Use    Smoking status: Never Smoker    Smokeless tobacco: Never Used   Substance and Sexual Activity    Alcohol use: No    Drug use: No     Family History   Problem Relation Age of Onset    COPD Mother     Neuropathy Mother     No Known Problems Father     No Known Problems Sister     Diabetes Brother     GERD Brother     No Known Problems Brother     No Known Problems Sister         Review of Systems   Constitutional: Negative for chills and fever. Cardiovascular: Negative for chest pain. Skin: Negative for itching and rash. Vitals:    01/28/19 1147   BP: 130/80   Pulse: 100   Resp: 14   Temp: 98.8 °F (37.1 °C)   SpO2: 99%   Weight: 210 lb (95.3 kg)   Height: 5' 3\" (1.6 m)   PainSc:   0 - No pain       Physical Exam   Constitutional: He is oriented to person, place, and time and well-developed, well-nourished, and in no distress. Neurological: He is alert and oriented to person, place, and time. Skin: Skin is warm and dry. Papular lesion on right upper thigh mildly tender and red. No drainage. Psychiatric: Mood, memory, affect and judgment normal.   Nursing note and vitals reviewed. Assessment/Plan      ICD-10-CM ICD-9-CM    1. Cellulitis of right lower extremity L03.115 682.6 amoxicillin-clavulanate (AUGMENTIN) 875-125 mg per tablet     I have discussed the diagnosis with the patient and the intended plan of care as seen in the above orders. The patient has received an after-visit summary and questions were answered concerning future plans. I have discussed medication, side effects, and warnings with the patient in detail. The patient verbalized understanding and is in agreement with the plan of care.  The patient will contact the office with any additional concerns.       Follow-up Disposition:  Return if symptoms worsen or fail to improve.  lab results and schedule of future lab studies reviewed with patient    Jayla Vuong MD

## 2019-01-28 NOTE — PROGRESS NOTES
Chief Complaint   Patient presents with    Mass     Lump on right inner thigh. C/o pain. Has had some clear drainage from site. 1. Have you been to the ER, urgent care clinic since your last visit? Hospitalized since your last visit? No    2. Have you seen or consulted any other health care providers outside of the [address] [place] [address] since your last visit? Include any pap smears or colon screening.  No

## 2019-01-28 NOTE — TELEPHONE ENCOUNTER
I would recommend that he take an OTC pepcid 30 min prior to taking an OTC Naproxen 500mg BID for the next week instead

## 2019-01-30 ENCOUNTER — TELEPHONE (OUTPATIENT)
Dept: FAMILY MEDICINE CLINIC | Age: 49
End: 2019-01-30

## 2019-01-30 DIAGNOSIS — L02.91 ABSCESS: Primary | ICD-10-CM

## 2019-01-30 NOTE — TELEPHONE ENCOUNTER
Patient wife called in Ref to a boil on her  inner thigh,states its not getting any better and would like to know if Dr. Nichole Kimble would like him to schedule another visit. Please contact patient on his cell phone at [phone].

## 2019-01-30 NOTE — TELEPHONE ENCOUNTER
Patients spouse, Jose Escoto called stating that Mr. Gualberto Hussein bump is getting much bigger. They want to know what Dr. Wilfredo Ordoñez can prescribe or do for him. Its bothering him while he is working. Sydnie's callback number is: [phone]. Please advise.

## 2019-01-31 NOTE — TELEPHONE ENCOUNTER
Leora Sanchez MD sent to Evelia Mcbride LPN   Caller: Unspecified Tarunlon Curet, 10:25 AM)             Referral sent for general surgery for eval of abscess. Called to let pt know of above message. Left message for patient at home number requesting return call. Spoke to Ms. Dallin Bullard. She said that pt went to Urgent Care and had cyst removed. I told her I would let dr Kaiser Bullock know and cancel referral. She expressed understanding.

## 2019-02-05 ENCOUNTER — TELEPHONE (OUTPATIENT)
Dept: FAMILY MEDICINE CLINIC | Age: 49
End: 2019-02-05

## 2019-02-06 NOTE — TELEPHONE ENCOUNTER
Mrs. Satinder Mas (pts wife) called on behalf of her  Mr. Shamir Dorsey stating he was still sore.  He was wondering if Dr. Maria Ines Valdez wanted to do another round of antibiotics again or see him to evaluate again 1st?     Patients number is [phone]

## 2019-02-07 ENCOUNTER — OFFICE VISIT (OUTPATIENT)
Dept: FAMILY MEDICINE CLINIC | Age: 49
End: 2019-02-07

## 2019-02-07 VITALS
SYSTOLIC BLOOD PRESSURE: 124 MMHG | WEIGHT: 209.2 LBS | DIASTOLIC BLOOD PRESSURE: 64 MMHG | HEART RATE: 71 BPM | TEMPERATURE: 98.6 F | OXYGEN SATURATION: 95 % | BODY MASS INDEX: 37.07 KG/M2 | RESPIRATION RATE: 14 BRPM | HEIGHT: 63 IN

## 2019-02-07 DIAGNOSIS — L02.415 CUTANEOUS ABSCESS OF RIGHT LOWER EXTREMITY: Primary | ICD-10-CM

## 2019-02-07 NOTE — PATIENT INSTRUCTIONS
Skin Abscess: Care Instructions  Your Care Instructions    A skin abscess is a bacterial infection that forms a pocket of pus. A boil is a kind of skin abscess. The doctor may have cut an opening in the abscess so that the pus can drain out. You may have gauze in the cut so that the abscess will stay open and keep draining. You may need antibiotics. You will need to follow up with your doctor to make sure the infection has gone away. The doctor has checked you carefully, but problems can develop later. If you notice any problems or new symptoms, get medical treatment right away. Follow-up care is a key part of your treatment and safety. Be sure to make and go to all appointments, and call your doctor if you are having problems. It's also a good idea to know your test results and keep a list of the medicines you take. How can you care for yourself at home? · Apply warm and dry compresses, a heating pad set on low, or a hot water bottle 3 or 4 times a day for pain. Keep a cloth between the heat source and your skin. · If your doctor prescribed antibiotics, take them as directed. Do not stop taking them just because you feel better. You need to take the full course of antibiotics. · Take pain medicines exactly as directed. ? If the doctor gave you a prescription medicine for pain, take it as prescribed. ? If you are not taking a prescription pain medicine, ask your doctor if you can take an over-the-counter medicine. · Keep your bandage clean and dry. Change the bandage whenever it gets wet or dirty, or at least one time a day. · If the abscess was packed with gauze:  ? Keep follow-up appointments to have the gauze changed or removed. If the doctor instructed you to remove the gauze, follow the instructions you were given for how to remove it. ? After the gauze is removed, soak the area in warm water for 15 to 20 minutes 2 times a day, until the wound closes. When should you call for help?   Call your doctor now or seek immediate medical care if:    · You have signs of worsening infection, such as:  ? Increased pain, swelling, warmth, or redness. ? Red streaks leading from the infected skin. ? Pus draining from the wound. ? A fever.    Watch closely for changes in your health, and be sure to contact your doctor if:    · You do not get better as expected. Where can you learn more? Go to http://oni-lay.info/. Enter S663 in the search box to learn more about \"Skin Abscess: Care Instructions. \"  Current as of: April 17, 2018  Content Version: 11.9  © 6647-4126 [employer]. Care instructions adapted under license by [employer] (which disclaims liability or warranty for this information). If you have questions about a medical condition or this instruction, always ask your healthcare professional. [address] any warranty or liability for your use of this information.

## 2019-02-07 NOTE — PROGRESS NOTES
Chief Complaint   Patient presents with    Follow-up     Pt here for f/u on mass on right leg which has resolved, no concerns. 1. Have you been to the ER, urgent care clinic since your last visit? Hospitalized since your last visit? No  2. Have you seen or consulted any other health care providers outside of the [address] [place] [address] since your last visit? Include any pap smears or colon screening. No    Medication reconciliation has been completed with patient. Care team discussed/updated as well as pharmacy.

## 2019-02-07 NOTE — PROGRESS NOTES
Santos Mora II is a 46 y.o. male  presents for follow up. No drainage or tenderness. No Known Allergies  Outpatient Medications Marked as Taking for the 2/7/19 encounter (Office Visit) with Alexa Cotton MD   Medication Sig Dispense Refill    amoxicillin-clavulanate (AUGMENTIN) 875-125 mg per tablet Take 1 Tab by mouth two (2) times a day for 10 days. 20 Tab 0    acetaminophen (TYLENOL PO) Take  by mouth.  topiramate ER (TROKENDI XR) 25 mg capsule 1 tab po qhs for 1 week then increase to 2 tab po qhs thereafter. 60 Cap 0    methylPREDNISolone (MEDROL DOSEPACK) 4 mg tablet Use as directed 1 Dose Pack 0    amLODIPine-benazepril (LOTREL) 5-20 mg per capsule TAKE 1 CAPSULE BY MOUTH EVERY DAY 30 Cap 2    naproxen sodium (ALEVE) 220 mg cap Take  by mouth.  diphenhydrAMINE (BENADRYL ALLERGY) 25 mg tablet Take 25 mg by mouth every six (6) hours as needed for Sleep.  omeprazole (PRILOSEC) 40 mg capsule TAKE ONE CAPSULE BY MOUTH TWICE A DAY  3    cyanocobalamin 1,000 mcg tablet Take 1,000 mcg by mouth daily.  pyridoxine, vitamin B6, (VITAMIN B-6) 100 mg tablet Take 100 mg by mouth daily.  glucosamine/chondr benz A sod (OSTEO BI-FLEX PO) Take  by mouth.  multivitamin (ONE A DAY) tablet Take 1 Tab by mouth daily.  ranitidine (ZANTAC) 150 mg tablet Take 300 mg by mouth two (2) times a day. Patient Active Problem List   Diagnosis Code    Severe obesity (BMI 35.0-39. 9) E66.01    Left lumbar radiculitis M54.16    DDD (degenerative disc disease), lumbar M51.36    Chronic cluster headache, not intractable G44.029    Hypertension, uncontrolled I10     Past Medical History:   Diagnosis Date    Arthritis     bilat knees    GERD (gastroesophageal reflux disease)     Neck injury about 2007    hit head on pipe, in [place]iro, told he had HNP     Social History     Socioeconomic History    Marital status:      Spouse name: Not on file    Number of children: Not on file    Years of education: Not on file    Highest education level: Not on file   Tobacco Use    Smoking status: Never Smoker    Smokeless tobacco: Never Used   Substance and Sexual Activity    Alcohol use: No    Drug use: No     Family History   Problem Relation Age of Onset    COPD Mother     Neuropathy Mother     No Known Problems Father     No Known Problems Sister     Diabetes Brother     GERD Brother     No Known Problems Brother     No Known Problems Sister         Review of Systems   Constitutional: Negative for chills, fever, malaise/fatigue and weight loss. Eyes: Negative for blurred vision. Respiratory: Negative for shortness of breath and wheezing. Cardiovascular: Negative for chest pain. Gastrointestinal: Negative for nausea and vomiting. Musculoskeletal: Negative for myalgias. Skin: Negative for itching and rash. Neurological: Negative for weakness. Vitals:    02/07/19 1509   BP: 124/64   Pulse: 71   Resp: 14   Temp: 98.6 °F (37 °C)   TempSrc: Oral   SpO2: 95%   Weight: 209 lb 3.2 oz (94.9 kg)   Height: 5' 3\" (1.6 m)   PainSc:   0 - No pain       Physical Exam   Constitutional: He is oriented to person, place, and time and well-developed, well-nourished, and in no distress. Neck: Normal range of motion. Neck supple. No thyromegaly present. Cardiovascular: Normal rate, regular rhythm and normal heart sounds. Pulmonary/Chest: Effort normal and breath sounds normal.   Musculoskeletal: Normal range of motion. Neurological: He is alert and oriented to person, place, and time. Skin: Skin is warm and dry. Psychiatric: Mood, memory, affect and judgment normal.   Nursing note and vitals reviewed. Assessment/Plan      ICD-10-CM ICD-9-CM    1. Cutaneous abscess of right lower extremity L02.415 682.6      Resolved    I have discussed the diagnosis with the patient and the intended plan of care as seen in the above orders.  The patient has received an after-visit summary and questions were answered concerning future plans. I have discussed medication, side effects, and warnings with the patient in detail. The patient verbalized understanding and is in agreement with the plan of care. The patient will contact the office with any additional concerns.       Follow-up Disposition:  Return if symptoms worsen or fail to improve.  lab results and schedule of future lab studies reviewed with patient    Christi Smith MD

## 2019-02-21 ENCOUNTER — OFFICE VISIT (OUTPATIENT)
Dept: ORTHOPEDIC SURGERY | Age: 49
End: 2019-02-21

## 2019-02-21 VITALS
SYSTOLIC BLOOD PRESSURE: 147 MMHG | DIASTOLIC BLOOD PRESSURE: 76 MMHG | TEMPERATURE: 97.8 F | BODY MASS INDEX: 37.7 KG/M2 | RESPIRATION RATE: 17 BRPM | WEIGHT: 212.8 LBS | HEART RATE: 79 BPM | HEIGHT: 63 IN

## 2019-02-21 DIAGNOSIS — M54.12 CERVICAL RADICULOPATHY: ICD-10-CM

## 2019-02-21 DIAGNOSIS — M48.02 CERVICAL STENOSIS OF SPINE: Primary | ICD-10-CM

## 2019-02-21 NOTE — PROGRESS NOTES
1. Have you been to the ER, urgent care clinic since your last visit? Hospitalized since your last visit? No 
 
2. Have you seen or consulted any other health care providers outside of the [address] [place] [address] since your last visit? Include any pap smears or colon screening.  No

## 2019-02-21 NOTE — PATIENT INSTRUCTIONS
Pinched Nerve in the Neck: Care Instructions Your Care Instructions A pinched nerve in the neck happens when a vertebra or disc in the upper part of your spine is damaged. This damage can happen because of an injury. Or it can just happen with age. The changes caused by the damage may put pressure on a nearby nerve root, pinching it. This causes symptoms such as sharp pain in your neck, shoulder, arm, hand, or back. You may also have tingling or numbness. Sometimes it makes your arm weaker. The symptoms are usually worse when you turn your head or strain your neck. For many people, the symptoms get better over time and finally go away. Early treatment usually includes medicines for pain and swelling. Sometimes physical therapy and special exercises may help. Follow-up care is a key part of your treatment and safety. Be sure to make and go to all appointments, and call your doctor if you are having problems. It's also a good idea to know your test results and keep a list of the medicines you take. How can you care for yourself at home? · Be safe with medicines. Read and follow all instructions on the label. ? If the doctor gave you a prescription medicine for pain, take it as prescribed. ? If you are not taking a prescription pain medicine, ask your doctor if you can take an over-the-counter medicine. · Try using a heating pad on a low or medium setting for 15 to 20 minutes every 2 or 3 hours. Try a warm shower in place of one session with the heating pad. You can also buy single-use heat wraps that last up to 8 hours. · You can also try an ice pack for 10 to 15 minutes every 2 to 3 hours. There isn't strong evidence that either heat or ice will help. But you can try them to see if they help you. · Don't spend too long in one position. Take short breaks to move around and change positions. · Wear a seat belt and shoulder harness when you are in a car. · Sleep with a pillow under your head and neck that keeps your neck straight. · If you were given a neck brace (cervical collar) to limit neck motion, wear it as instructed for as many days as your doctor tells you to. Do not wear it longer than you were told to. Wearing a brace for too long can lead to neck stiffness and can weaken the neck muscles. · Follow your doctor's instructions for gentle neck-stretching exercises. · Do not smoke. Smoking can slow healing of your discs. If you need help quitting, talk to your doctor about stop-smoking programs and medicines. These can increase your chances of quitting for good. · Avoid strenuous work or exercise until your doctor says it is okay. When should you call for help? Call 911 anytime you think you may need emergency care. For example, call if: 
  · You are unable to move an arm or a leg at all.  
[facility] your doctor now or seek immediate medical care if: 
  · You have new or worse symptoms in your arms, legs, chest, belly, or buttocks. Symptoms may include: 
? Numbness or tingling. ? Weakness. ? Pain.  
  · You lose bladder or bowel control.  
 Watch closely for changes in your health, and be sure to contact your doctor if: 
  · You are not getting better as expected. Where can you learn more? Go to http://oni-lay.info/. Enter F842 in the search box to learn more about \"Pinched Nerve in the Neck: Care Instructions. \" Current as of: September 20, 2018 Content Version: 11.9 © 7626-7604 [employer]. Care instructions adapted under license by [employer] (which disclaims liability or warranty for this information). If you have questions about a medical condition or this instruction, always ask your healthcare professional. [place] [address] any warranty or liability for your use of this information. Cervical Spinal Stenosis: Care Instructions Your Care Instructions Spinal stenosis is a narrowing of the canal that surrounds the spinal cord and nerve roots. Sometimes bone and other tissue grow into this canal and press on the nerves that branch out from the spinal cord. This can happen as a part of aging. When the narrowing happens in your neck, it's called cervical spinal stenosis. It often causes stiffness, pain, numbness, and weakness in the neck, shoulders, arms, hands, or legs. It can even cause problems with your balance, coordination, and bowel or bladder control. But some people have no symptoms. You may be able to get relief from the symptoms of spinal stenosis by taking pain medicine. Your doctor may suggest physical therapy and exercises to keep your spine strong and flexible. Some people try steroid shots to reduce swelling. If pain and numbness in your neck, arms, or legs are still so bad that you cannot do your normal activities, you may need surgery. Follow-up care is a key part of your treatment and safety. Be sure to make and go to all appointments, and call your doctor if you are having problems. It's also a good idea to know your test results and keep a list of the medicines you take. How can you care for yourself at home? · Ask your doctor if you can take an over-the-counter pain medicine, such as acetaminophen (Tylenol), ibuprofen (Advil, Motrin), or naproxen (Aleve). Be safe with medicines. Read and follow all instructions on the label. · Do not take two or more pain medicines at the same time unless the doctor told you to. Many pain medicines have acetaminophen, which is Tylenol. Too much acetaminophen (Tylenol) can be harmful. · Change positions often when you are standing or sitting. This may reduce pressure on the spinal cord and its nerves. · When you rest, use pillows or towel rolls to support your neck and head in a comfortable position. · Follow your doctor's instructions about activity.  He or she may tell you not to do sports or activities that could injure your neck. · Stretch your neck and shoulders as your doctor or physical therapist recommends. If your doctor says it is okay to do them, these exercises may help: ? Neck stretches to the side. Keep your shoulders relaxed and slowly tilt your head straight over toward one shoulder. Hold for 15 seconds. Let the weight of your head stretch your muscles. Then do the same toward the other shoulder. ? Neck rotations. Keep your chin level and slowly turn your head to one side. Hold for 15 seconds. Then do the same to the other side. ? Shoulder rolls. Roll your shoulders up, then back, and then down in a smooth, circular motion. Repeat several times. When should you call for help? Call 911 anytime you think you may need emergency care. For example, call if: 
  · You are unable to move an arm or a leg at all.  
[facility] your doctor now or seek immediate medical care if: 
  · You have new or worse symptoms in your arms, legs, belly, or buttocks. Symptoms may include: 
? Numbness or tingling. ? Weakness. ? Pain.  
  · You lose bladder or bowel control.  
 Watch closely for changes in your health, and be sure to contact your doctor if: 
  · You do not get better as expected. Where can you learn more? Go to http://oni-lay.info/. Enter  in the search box to learn more about \"Cervical Spinal Stenosis: Care Instructions. \" Current as of: September 20, 2018 Content Version: 11.9 © 5063-6029 [employer]. Care instructions adapted under license by [employer] (which disclaims liability or warranty for this information). If you have questions about a medical condition or this instruction, always ask your healthcare professional. [place] [address] any warranty or liability for your use of this information. Surgery for Cervical Myelopathy: Before Your Surgery What is surgery for cervical myelopathy? Surgery for cervical myelopathy (say \"de-iom-VZ-uh-thee\") removes any tissues that are pressing on the spinal cord. Tissues can include bone, ruptured discs, and ligaments. The surgery can be done from the back or the front of the neck. If surgery is done from the front, small pieces of bone or small plates and screws will be used to hold the spine in place after the tissue is removed. This is called fusion. If surgery is done from the back, a fusion may or may not be done at the same time. Your doctor makes a cut (incision) in the skin over the spine where the pressure on the spinal cord is. He or she puts special surgical tools through the incision. Your doctor removes any tissues that are putting pressure on the spinal cord. He or she then closes the incision with stitches. You will have a small scar on your neck or back. It will fade with time. Surgery is done to stop the pressure on the spinal cord. This may help with pain and numbness and may improve movement. It will also help prevent more damage. Some people notice that their symptoms improve very soon after surgery. But your neck and upper back may feel stiff and sore for several weeks after your surgery. Most people stay overnight in the hospital. You will probably be able to return to work or your normal routine in 4 to 6 weeks. In some cases, the doctor may recommend a rehabilitation program after surgery. This may include physical therapy and home exercises. Follow-up care is a key part of your treatment and safety. Be sure to make and go to all appointments, and call your doctor if you are having problems. It's also a good idea to know your test results and keep a list of the medicines you take. What happens before surgery? 
 Surgery can be stressful. This information will help you understand what you can expect. And it will help you safely prepare for surgery. 
 Preparing for surgery   · Understand exactly what surgery is planned, along with the risks, benefits, and other options. · Tell your doctors ALL the medicines, vitamins, supplements, and herbal remedies you take. Some of these can increase the risk of bleeding or interact with anesthesia.  
  · If you take blood thinners, such as warfarin (Coumadin), clopidogrel (Plavix), or aspirin, be sure to talk to your doctor. He or she will tell you if you should stop taking these medicines before your surgery. Make sure that you understand exactly what your doctor wants you to do.  
  · Your doctor will tell you which medicines to take or stop before your surgery. You may need to stop taking certain medicines a week or more before surgery. So talk to your doctor as soon as you can.  
  · If you have an advance directive, let your doctor know. It may include a living will and a durable power of  for health care. Bring a copy to the hospital. If you don't have one, you may want to prepare one. It lets your doctor and loved ones know your health care wishes. Doctors advise that everyone prepare these papers before any type of surgery or procedure. What happens on the day of surgery? · Follow the instructions exactly about when to stop eating and drinking. If you don't, your surgery may be canceled. If your doctor told you to take your medicines on the day of surgery, take them with only a sip of water.  
  · Take a bath or shower before you come in for your surgery. Do not apply lotions, perfumes, deodorants, or nail polish.  
  · Do not shave the surgical site yourself.  
  · Take off all jewelry and piercings. And take out contact lenses, if you wear them.  
 At the hospital or surgery center · Bring a picture ID.  
  · The area for surgery is often marked to make sure there are no errors.  
  · You will be kept comfortable and safe by your anesthesia provider. You will be asleep during the surgery.   · The surgery will take about 1 to 2 hours.  
  · When you wake up, you will be lying flat on your back. You may have a plastic or foam collar around your neck. Going home · Be sure you have someone to drive you home. Anesthesia and pain medicine make it unsafe for you to drive.  
  · You will be given more specific instructions about recovering from your surgery. They will cover things like diet, wound care, follow-up care, driving, and getting back to your normal routine. When should you call your doctor? · You have questions or concerns.  
  · You don't understand how to prepare for your surgery.  
  · You become ill before the surgery (such as fever, flu, or a cold).  
  · You need to reschedule or have changed your mind about having the surgery. Where can you learn more? Go to http://oni-lay.info/. Enter Y801 in the search box to learn more about \"Surgery for Cervical Myelopathy: Before Your Surgery. \" Current as of: September 20, 2018 Content Version: 11.9 © 2790-3899 [employer]. Care instructions adapted under license by [employer] (which disclaims liability or warranty for this information). If you have questions about a medical condition or this instruction, always ask your healthcare professional. [address] any warranty or liability for your use of this information.

## 2019-02-21 NOTE — PROGRESS NOTES
[facility] [address]. 
[address], [address] Phone: [phone] Fax: [phone] Damien Faustin : 1970 PCP: Tanisha Deal MD 
 
PROGRESS NOTE ASSESSMENT AND PLAN Diagnoses and all orders for this visit: 1. Cervical stenosis of spine 2. Cervical radiculopathy 1. 49yo w/3 months of LUE radiculopathy superimposed on chronic stenosis. Now w/weakness and atrophy LUE. 2. Discussed surgery as treatment option 3. Restart Gabapentin 100mg qdinner 4. May take Advil PRN 5. Avoid overhead lifting or reaching. 6. DC Trokendi 7. Given information on cervical stenosis and radiculopathy and cervical myelopathy pre op Follow-up Disposition: 
Return for with Dr. Micheal Velasco for surgical eval. 
 
 
HISTORY OF PRESENT ILLNESS Bhaskar Parson II is a 46 y.o. male. RHD. Last visit pt was sent to have a cervical spine MRI. Images reviewed with the pt. Last OV given trial of Trokendi, MDP and instructed to DC Gabapentin. He reports dizziness with Trokendi. Pt notes he will wake at night due to tingling/numness in LUE. He states he has some difficulty with holding objects and dexterity in LUE. Pt admits to many headaches. He admits to loss of muscle LUE. Pt denies sleeping well. Location of pain: minimal in neck, shoulder blades Does pain radiate into extremities: LUE tingling, numbness worsening - annoying not painful x 3 months Does patient have weakness: LUE, most in hand. X 3 months Pt denies saddle paresthesias. Medications pt is on: Trokendi 50mg QHS with dizziness. Advil and Tylenol PRN minimal benefit. Pt denies recent ED visits or hospitalizations. Denies persistent fevers, chills, weight changes, neurogenic bowel or bladder symptoms. Treatments patient has tried: 
Physical therapy:No 
Doing HEP: No 
Non-opioid medications: Yes Failed Gabapentin, Trokendi both due to dizziness Spinal injections: Yes in [facility] lumbar with benefit. Spinal surgery- No.  
Last C MRI 2019: central stenosis C3-4, C4-5. Foraminal stenosis severe  L C3-4, B/L C4-T1  reviewed. PMHx of reflux. Pt works as a drywall and . Pt is from [place], lives here past few years. Pt's wife is pregnant. Pt denies hx of CTS. Pain Assessment  2/21/2019 Location of Pain Neck;Arm Location Modifiers Left Severity of Pain 0 Quality of Pain (No Data) Quality of Pain Comment numbness, tingling Duration of Pain - Frequency of Pain Intermittent Aggravating Factors - Aggravating Factors Comment - Limiting Behavior -  
Relieving Factors - Relieving Factors Comment - Result of Injury - Type of Injury - Type of Injury Comment -  
 
 
MRI cervical spine 2/6/19 Result Impression 1. The spinal canal is fairly small on a developmental basis throughout. 2. Central stenosis with mild cord distortion is maximal at C3-C4 and C4-C5. No cord signal change and this would not with complete confidence correlate with a cervical spondylitic myelopathy. 3. Multilevel bilateral neural foramen stenosis at several levels as described above. Foraminal stenosis is severe on the left at C3-C4 and bilaterally from C4 through T1. PAST MEDICAL HISTORY Past Medical History:  
Diagnosis Date  Arthritis   
 bilat knees  GERD (gastroesophageal reflux disease)  Neck injury about 2007  
 hit head on pipe, in [place], tx chiro, told he had HNP Past Surgical History:  
Procedure Laterality Date  FULL ESOPHAGEAL MANOMETRY  10/10/2018 [id number] [address] [facility] MEDICATIONS Current Outpatient Medications Medication Sig Dispense Refill  acetaminophen (TYLENOL PO) Take  by mouth.  amLODIPine-benazepril (LOTREL) 5-20 mg per capsule TAKE 1 CAPSULE BY MOUTH EVERY DAY 30 Cap 2  
 naproxen sodium (ALEVE) 220 mg cap Take  by mouth.  diphenhydrAMINE (BENADRYL ALLERGY) 25 mg tablet Take 25 mg by mouth every six (6) hours as needed for Sleep.  omeprazole (PRILOSEC) 40 mg capsule TAKE ONE CAPSULE BY MOUTH TWICE A DAY  3  
 cyanocobalamin 1,000 mcg tablet Take 1,000 mcg by mouth daily.  pyridoxine, vitamin B6, (VITAMIN B-6) 100 mg tablet Take 100 mg by mouth daily.  glucosamine/chondr benz A sod (OSTEO BI-FLEX PO) Take  by mouth.  multivitamin (ONE A DAY) tablet Take 1 Tab by mouth daily.  ranitidine (ZANTAC) 150 mg tablet Take 300 mg by mouth two (2) times a day.  topiramate ER (TROKENDI XR) 25 mg capsule 1 tab po qhs for 1 week then increase to 2 tab po qhs thereafter. 60 Cap 0  
 methylPREDNISolone (MEDROL DOSEPACK) 4 mg tablet Use as directed 1 Dose Pack 0  
 gabapentin (NEURONTIN) 100 mg capsule Take 2 Caps by mouth three (3) times daily. TAKE 1 TABLET BY MOUTH 3 TIMES A DAY AS NEEDED FOR PAIN 90 Cap 1 ALLERGIES No Known Allergies SOCIAL HISTORY Social History Socioeconomic History  Marital status:  Spouse name: Not on file  Number of children: Not on file  Years of education: Not on file  Highest education level: Not on file Social Needs  Financial resource strain: Not on file  Food insecurity - worry: Not on file  Food insecurity - inability: Not on file  Transportation needs - medical: Not on file  Transportation needs - non-medical: Not on file Occupational History  Not on file Tobacco Use  Smoking status: Never Smoker  Smokeless tobacco: Never Used Substance and Sexual Activity  Alcohol use: No  
 Drug use: No  
 Sexual activity: Not on file Other Topics Concern  Not on file Social History Narrative  Not on file FAMILY HISTORY Family History Problem Relation Age of Onset  COPD Mother  Neuropathy Mother  No Known Problems Father  No Known Problems Sister  Diabetes Brother  GERD Brother  No Known Problems Brother  No Known Problems Sister REVIEW OF SYSTEMS Review of Systems Constitutional: Negative for chills, fever and weight loss. Respiratory: Negative for shortness of breath. Cardiovascular: Negative for chest pain. Gastrointestinal: Negative for constipation. Negative for fecal incontinence Genitourinary: Negative for dysuria. Negative for urinary incontinence Musculoskeletal:  
     Per HPI Skin: Negative for rash. Neurological: Positive for tingling, focal weakness and headaches. Negative for dizziness and tremors. Endo/Heme/Allergies: Does not bruise/bleed easily. Psychiatric/Behavioral: The patient does not have insomnia. PHYSICAL EXAMINATION Visit Vitals /76 (BP 1 Location: Left arm, BP Patient Position: Sitting) Pulse 79 Temp 97.8 °F (36.6 °C) (Oral) Resp 17 Ht 5' 3\" (1.6 m) Wt 212 lb 12.8 oz (96.5 kg) BMI 37.70 kg/m² Accompanied by family member. Constitutional:  Well developed, well nourished, in no acute distress. Psychiatric: Affect and mood are appropriate. Integumentary: No rashes or abrasions noted on exposed areas. Cardiovascular/Peripheral Vascular: Intact l pulses. No peripheral edema is noted BLE. Lymphatic:  No evidence of lymphedema. No cervical lymphadenopathy. SPINE/MUSCULOSKELETAL EXAM 
 
Cervical spine: 
Neck is midline. Normal muscle tone. Mild atrophy of L triceps and wrist extensor muscles. Tenderness to palpation none cervical spine. Negative Spurling's sign. Negative Tinel's sign. Negative Mcclain's sign. Sensation grossly decreased to light touch, digits 4 and 5 on left. MOTOR:   
  Biceps  Triceps Deltoids Wrist Ext Wrist Flex Hand Intrin Right +4/5 +4/5 +4/5 +4/5 +4/5 +4/5 Left +4/5 +4/5 +4/5 3/5 4/5 2/5 Pinch intact. No difficulty with tandem gait. Ambulation without assistive device. FWB. Written by Rico Garay, as dictated by Angelica Hamilton MD. 
 
I, Dr. Angelica Hamilton MD, confirm that all documentation is accurate. Mr. Sneed Section may have a reminder for a \"due or due soon\" health maintenance. I have asked that he contact his primary care provider for follow-up on this health maintenance.

## 2019-03-05 ENCOUNTER — OFFICE VISIT (OUTPATIENT)
Dept: ORTHOPEDIC SURGERY | Age: 49
End: 2019-03-05

## 2019-03-05 VITALS
RESPIRATION RATE: 16 BRPM | SYSTOLIC BLOOD PRESSURE: 118 MMHG | DIASTOLIC BLOOD PRESSURE: 76 MMHG | BODY MASS INDEX: 37.14 KG/M2 | HEIGHT: 63 IN | WEIGHT: 209.6 LBS | HEART RATE: 71 BPM | TEMPERATURE: 98 F | OXYGEN SATURATION: 96 %

## 2019-03-05 DIAGNOSIS — R29.898 LEFT ARM WEAKNESS: ICD-10-CM

## 2019-03-05 DIAGNOSIS — M48.02 CERVICAL STENOSIS OF SPINE: Primary | ICD-10-CM

## 2019-03-05 NOTE — PATIENT INSTRUCTIONS
Electromyogram (EMG) and Nerve Conduction Studies: About These Tests  What are they? An electromyogram (EMG) measures the electrical activity of your muscles when you are not using them (at rest) and when you tighten them (muscle contraction). Nerve conduction studies (NCS) measure how well and how fast the nerves can send electrical signals. EMG and nerve conduction studies are often done together. If they are done together, the nerve conduction studies are done before the EMG. Why are they done? You may need an EMG to find diseases that damage your muscles or nerves or to find why you cannot move your muscles (paralysis), why they feel weak, or why they twitch. You may need nerve conduction studies to find damage to the nerves that lead from the brain and spinal cord to the rest of the body (peripheral nervous system). Nerve conduction studies are often used to help find nerve disorders, such as carpal tunnel syndrome. How can you prepare for these tests? · Tell your doctors ALL the medicines, vitamins, supplements, and herbal remedies you take. Some medicines can affect the test results. You may need to stop taking some medicines before you have this test.     · If you take aspirin or some other blood thinner, be sure to talk to your doctor. He or she will tell you if you should stop taking it before your test. Make sure that you understand exactly what your doctor wants you to do.     · Wear loose-fitting clothing. You may be given a hospital gown to wear.     · The electrodes for the test are attached to your skin. Your skin needs to be clean and free of sprays, oils, creams, and lotions. What happens during the tests? You lie on a table or bed or sit in a reclining chair so your muscles are relaxed. For an EMG:  · Your doctor will insert a needle electrode into a muscle.  This will record the electrical activity while the muscle is at rest. You may feel a quick, sharp pain when the needle electrode is put into a muscle. · Your doctor will ask you to tighten the same muscle slowly and steadily while the electrical activity is recorded. · Your doctor may move the electrode to a different area of the muscle or a different muscle. For nerve conduction studies:  · Your doctor will attach two types of electrodes to your skin. ? One type of electrode is placed over a nerve and will give the nerve an electrical pulse. ? The other type of electrode is placed over the muscle that the nerve controls. It will record how long it takes the muscle to react to the electrical pulse. · You will be able to feel the electrical pulses. They are small shocks and are safe. What else should you know about these tests? · After an EMG, you may be sore and have a tingling feeling in your muscles for up to 2 days. You may have small bruises or swelling at the needle site. · For an EMG, you may be asked to sign a consent form. Talk to your doctor about any concerns you have about the need for the test, its risks, how it will be done, or what the results will mean. How long do they take? · An EMG may take 30 to 60 minutes. · Nerve conduction tests may take from 15 minutes to 1 hour or more. It depends on how many nerves and muscles your doctor tests. What happens after these tests? · If any of the test areas are sore:  ? Put ice or a cold pack on the area for 10 to 20 minutes at a time. Put a thin cloth between the ice and your skin. ? Take an over-the-counter pain medicine, such as acetaminophen (Tylenol), ibuprofen (Advil, Motrin), or naproxen (Aleve). Be safe with medicines. Read and follow all instructions on the label. · You will probably be able to go home right away. · You can go back to your usual activities right away. When should you call for help?   Watch closely for changes in your health, and be sure to contact your doctor if:  · Muscle pain from an EMG test gets worse or you have swelling, tenderness, or pus at any of the needle sites. · You have any problems that you think may be from the test.  · You have any questions about the test or have not received your results. Follow-up care is a key part of your treatment and safety. Be sure to make and go to all appointments, and call your doctor if you are having problems. It's also a good idea to keep a list of the medicines you take. Ask your doctor when you can expect to have your test results. Where can you learn more? Go to http://oni-lay.info/. Enter C033 in the search box to learn more about \"Electromyogram (EMG) and Nerve Conduction Studies: About These Tests. \"  Current as of: Andreina 3, 2018  Content Version: 11.9  © 6235-7039 [employer]. Care instructions adapted under license by  (which disclaims liability or warranty for this information). If you have questions about a medical condition or this instruction, always ask your healthcare professional. [address] any warranty or liability for your use of this information.

## 2019-03-05 NOTE — PROGRESS NOTES
[address]  [address], [address]  [place]  Phone: [phone]  Fax: [phone]  INITIAL CONSULTATION  Patient: Tracey Huff                MRN: [id number]       SSN: [id number]  YOB: 1970        AGE: 46 y.o. SEX: male  Body mass index is 37.13 kg/m². PCP: Laurent Combs MD  03/05/19    Chief Complaint   Patient presents with    Neck Pain    Hand Pain     Left     Follow-up     Surgery Consult          HISTORY OF PRESENT ILLNESS, RADIOGRAPHS, and PLAN:         HISTORY OF PRESENT ILLNESS:  Mr. Fernando Odonnell is seen at the request of Dr. Moreno Guillermo and Dr. Cornelia Lima. The patient is a pleasant 57-year-old male with a history of GERD who works for the [employer]. He says he had a cervical trauma years ago in [place] and was told he needed cervical surgery, but declined because of the risks involved. He continued to work with some cervicalgia. In 11/2018, he began to experience severe weakness in his left upper extremity. He is right-hand-dominant. He was seen by Dr. Cornelia Lima of hand surgery and referred for spine evaluation. PHYSICAL EXAMINATION:  On exam, he says he gets tingling in his hand and weakness in his hand. Chronic neck ache; nothing acute. His hand demonstrates severe intrinsic weakness that I would rate as 1/5; triceps weakness that I would rate as 3/5. No hyperreflexia. Good tandem gait. Normal exam otherwise. MRI of the cervical spine demonstrates severe cervical spondylosis with stenosis C3-4, C4-5, C5-6, C6-7  and C7-T1. There is severe foraminal stenosis at C7-T1 on his MR. Foraminal stenosis with spondylitic changes and disc protrusion and also foraminal stenosis at C6-7. Foraminal stenosis at all the levels higher. No gross instability. On plain x-rays, you cannot visualize below C6 on a plain x-ray.       ASSESSMENT/PLAN:  We have a patient with I believe a cervical radiculopathy, but not convinced it is a myeloradiculopathy, though he has cervical stenosis. It has been chronic. It is more weakness than pain. It likely requires a decompression. It is a hard level to get to and I am not sure we can actually address C7-T1 anteriorly from his habitus. It may be blocked in terms of being able to view down the axis of the disc space by his clavicle. At this point, I would like to get a CT scan of his neck for surgical planning as well as an EMG to confirm that this is emanating from the cervical spine. He may be a better candidate for a posterior laminoforaminotomy at C7-T1 and C6-7. I discussed also his possible need down the road for a global cervical decompression and fusion that would encompass the segments from C3-4 to C7-T1. He works at [employer], but as a more supervisory role, but still does need to do physical work. He has a young family. He is not interested in addressing these other cervical stenotic levels where he does not have clear myelopathic findings. I will see him back following his EMG and CT scan. cc:  Dr. Roxy Das           Past Medical History:   Diagnosis Date    Arthritis     bilat knees    GERD (gastroesophageal reflux disease)     Neck injury about 2007    hit head on pipe, in [place], tx chiro, told he had HNP       Family History   Problem Relation Age of Onset    COPD Mother     Neuropathy Mother     No Known Problems Father     No Known Problems Sister     Diabetes Brother     GERD Brother     No Known Problems Brother     No Known Problems Sister        Current Outpatient Medications   Medication Sig Dispense Refill    acetaminophen (TYLENOL PO) Take  by mouth.  amLODIPine-benazepril (LOTREL) 5-20 mg per capsule TAKE 1 CAPSULE BY MOUTH EVERY DAY 30 Cap 2    naproxen sodium (ALEVE) 220 mg cap Take  by mouth.  diphenhydrAMINE (BENADRYL ALLERGY) 25 mg tablet Take 25 mg by mouth every six (6) hours as needed for Sleep.       omeprazole (PRILOSEC) 40 mg capsule TAKE ONE CAPSULE BY MOUTH TWICE A DAY  3    cyanocobalamin 1,000 mcg tablet Take 1,000 mcg by mouth daily.  glucosamine/chondr benz A sod (OSTEO BI-FLEX PO) Take  by mouth.  multivitamin (ONE A DAY) tablet Take 1 Tab by mouth daily.  ranitidine (ZANTAC) 150 mg tablet Take 300 mg by mouth two (2) times a day.  topiramate ER (TROKENDI XR) 25 mg capsule 1 tab po qhs for 1 week then increase to 2 tab po qhs thereafter. (Patient not taking: Reported on 3/5/2019) 60 Cap 0    gabapentin (NEURONTIN) 100 mg capsule Take 2 Caps by mouth three (3) times daily. TAKE 1 TABLET BY MOUTH 3 TIMES A DAY AS NEEDED FOR PAIN 90 Cap 1    pyridoxine, vitamin B6, (VITAMIN B-6) 100 mg tablet Take 100 mg by mouth daily. No Known Allergies    Past Surgical History:   Procedure Laterality Date    FULL ESOPHAGEAL MANOMETRY  10/10/2018         HX HERNIA REPAIR  1992       Past Medical History:   Diagnosis Date    Arthritis     bilat knees    GERD (gastroesophageal reflux disease)     Neck injury about 2007    hit head on pipe, in [place], tx chiro, told he had HNP       Social History     Socioeconomic History    Marital status:      Spouse name: Not on file    Number of children: Not on file    Years of education: Not on file    Highest education level: Not on file   Social Needs    Financial resource strain: Not on file    Food insecurity - worry: Not on file    Food insecurity - inability: Not on file   Button needs - medical: Not on file   Button needs - non-medical: Not on file   Occupational History    Not on file   Tobacco Use    Smoking status: Never Smoker    Smokeless tobacco: Never Used   Substance and Sexual Activity    Alcohol use: No    Drug use: No    Sexual activity: Not on file   Other Topics Concern    Not on file   Social History Narrative    Not on file           REVIEW OF SYSTEMS:   CONSTITUTIONAL SYMPTOMS:  Negative.    EYES: Negative. EARS, NOSE, THROAT AND MOUTH:  Negative. CARDIOVASCULAR:  Negative. RESPIRATORY:  Negative. GENITOURINARY: Per HPI. GASTROINTESTINAL:  Per HPI. INTEGUMENTARY (SKIN AND/OR BREAST):  Negative. MUSCULOSKELETAL: Per HPI.   ENDOCRINE/RHEUMATOLOGIC:  Negative. NEUROLOGICAL:  Per HPI. HEMATOLOGIC/LYMPHATIC:  Negative. ALLERGIC/IMMUNOLOGIC:  Negative. PSYCHIATRIC:  Negative. PHYSICAL EXAMINATION:   Visit Vitals  /76   Pulse 71   Temp 98 °F (36.7 °C)   Resp 16   Ht 5' 3\" (1.6 m)   Wt 209 lb 9.6 oz (95.1 kg)   SpO2 96%   BMI 37.13 kg/m²    PAIN SCALE: 2/10    CONSTITUTIONAL: The patient is in no apparent distress and is alert and oriented x 3. HEENT: Normocephalic. Hearing grossly intact. NECK: Supple and symmetric. no tenderness, or masses were felt. RESPIRATORY: No labored breathing. CARDIOVASCULAR: The carotid pulses were normal. Peripheral pulses were 2+. CHEST: Normal AP diameter and normal contour without any kyphoscoliosis. LYMPHATIC: No lymphadenopathy was appreciated in the neck, axillae or groin. SKIN:  Negative for scars, rashes, lesions, or ulcers on the right upper, right lower, left upper, left lower and trunk. NEUROLOGICAL: Alert and oriented x 3. Ambulation without assistive device. FWB. EXTREMITIES:  See musculoskeletal.  MUSCULOSKELETAL:   Head and Neck:  Negative for misalignment, asymmetry, crepitation, defects, tenderness masses or effusions.  Left Upper Extremity: Intrinsic weakness. Triceps weakness. Inspection, percussion and palpation performed. Mcclains sign is negative.  Right Upper Extremity: Inspection, percussion and palpation performed. Mcclains sign is negative.  Spine, Ribs and Pelvis: Inspection, percussion and palpation performed. Negative for misalignment, asymmetry, crepitation, defects, tenderness masses or effusions.  Left Lower Extremity: Inspection, percussion and palpation performed.    Negative straight leg raise.   Right Lower Extremity: Inspection, percussion and palpation performed. Negative straight leg raise. SPINE EXAM:     Cervical spine: Neck is midline. Normal muscle tone. No focal atrophy is noted. Lumbar spine: No rash, ecchymosis, or gross obliquity. No focal atrophy is noted. ASSESSMENT    ICD-10-CM ICD-9-CM    1. Cervical stenosis of spine M48.02 723.0 CT SPINE CERV WO CONT   2. Left arm weakness R29.898 729.89 EMG ONE EXTREMITY UPPER LT       Written by Millicent Yañez, as dictated by Reinaldo Basurto MD.    I, Dr. Reinaldo Basurto MD, confirm that all documentation is accurate.

## 2019-03-14 ENCOUNTER — TELEPHONE (OUTPATIENT)
Dept: FAMILY MEDICINE CLINIC | Age: 49
End: 2019-03-14

## 2019-03-14 DIAGNOSIS — J30.2 SEASONAL ALLERGIC RHINITIS, UNSPECIFIED TRIGGER: Primary | ICD-10-CM

## 2019-03-14 DIAGNOSIS — Z91.09 ENVIRONMENTAL ALLERGIES: ICD-10-CM

## 2019-03-14 NOTE — LETTER
3/25/2019 3:36 PM 
 
Mr. [address] [facility]  [address]  [place], We have been unable to reach you at the number on file. At your convenience, please contact the office. Our number is [phone]. Sincerely, Crystal Kelley MD

## 2019-03-14 NOTE — TELEPHONE ENCOUNTER
Spouse, Rowena Butcher called inquiring about a shot that MrMargaret Banks used to get with Dr. [facility] (last pcp). It helped with his allergies. She mentioned it was steroid shot, like cortisone. His allergies causes him to have vertigo per the spouse. I told her I don't think we keep that here, she wants to know if we could get it. I informed her I would send the msg and someone would get back with her. If not, maybe we can start a referral to ENT to get that taken care of. Her home number for call back is: [phone].

## 2019-03-15 NOTE — TELEPHONE ENCOUNTER
A referral to ENT will be placed to allergy. Called to speak with pt. Left message for patient at home number requesting return call.

## 2019-03-26 ENCOUNTER — HOSPITAL ENCOUNTER (OUTPATIENT)
Dept: CT IMAGING | Age: 49
Discharge: HOME | End: 2019-03-26
Attending: ORTHOPAEDIC SURGERY
Payer: COMMERCIAL

## 2019-03-26 DIAGNOSIS — M48.02 CERVICAL STENOSIS OF SPINE: ICD-10-CM

## 2019-03-26 PROCEDURE — 72125 CT NECK SPINE W/O DYE: CPT

## 2019-04-12 DIAGNOSIS — I10 HYPERTENSION, UNCONTROLLED: ICD-10-CM

## 2019-04-12 RX ORDER — AMLODIPINE AND BENAZEPRIL HYDROCHLORIDE 5; 20 MG/1; MG/1
CAPSULE ORAL
Qty: 30 CAP | Refills: 2 | Status: SHIPPED | OUTPATIENT
Start: 2019-04-12 | End: 2019-07-06 | Stop reason: SDUPTHER

## 2019-04-24 ENCOUNTER — OFFICE VISIT (OUTPATIENT)
Dept: ORTHOPEDIC SURGERY | Age: 49
End: 2019-04-24

## 2019-04-24 VITALS
WEIGHT: 211.4 LBS | TEMPERATURE: 97.9 F | DIASTOLIC BLOOD PRESSURE: 78 MMHG | BODY MASS INDEX: 37.46 KG/M2 | HEIGHT: 63 IN | SYSTOLIC BLOOD PRESSURE: 129 MMHG | OXYGEN SATURATION: 96 % | HEART RATE: 72 BPM | RESPIRATION RATE: 16 BRPM

## 2019-04-24 DIAGNOSIS — R20.2 NUMBNESS AND TINGLING IN LEFT ARM: Primary | ICD-10-CM

## 2019-04-24 DIAGNOSIS — R29.898 LEFT ARM WEAKNESS: ICD-10-CM

## 2019-04-24 DIAGNOSIS — R20.0 NUMBNESS AND TINGLING IN LEFT ARM: Primary | ICD-10-CM

## 2019-04-24 DIAGNOSIS — R94.131 ABNORMAL EMG: ICD-10-CM

## 2019-04-24 NOTE — LETTER
4/24/19 Patient: Hayed Soto II YOB: 1970 Date of Visit: 4/24/2019 Guillermo Moser MD 
[address] [facility] [address] [facility] [address] VIA In Basket Dear Guillermo Moser MD, Thank you for referring Mr. Crowell Spine to [address] [place] [address] [facility] for evaluation. My notes for this consultation are attached. If you have questions, please do not hesitate to call me. I look forward to following your patient along with you.  
 
 
Sincerely, 
 
Dustin Ball MD

## 2019-04-24 NOTE — PROGRESS NOTES
[address]  [address]  [address]  Phone: [phone]  Fax: [phone]        Julia Lynn  : 1970  PCP: Niyah Escalona MD  2019    ELECTROMYOGRAPHY AND NERVE CONDUCTION STUDIES    Maria G Barkley II was referred by Dr. Kayla Turpin for electrodiagnostic evaluation of LUE weakness and paraesthesia. NCV & EMG Findings:  Evaluation of the left median motor nerve showed prolonged distal onset latency (4.8 ms). The left median/ulnar (dig IV) comparison nerve showed prolonged distal peak latency (Median Wr, 4.2 ms) and abnormal peak latency difference (Median Wr-Ulnar Wr, 1.2 ms). All remaining nerves (as indicated in the following tables) were within normal limits. Needle evaluation of the left triceps muscle showed increased motor unit amplitude. The left Ext Digitorum muscle showed increased insertional activity, increased spontaneous activity, moderately increased polyphasic potentials, and diminished recruitment. The left Abd Dig Min muscle showed increased insertional activity, slightly increased spontaneous activity, and diminished recruitment. The left first dorsal interosseous muscle showed increased insertional activity and moderately increased spontaneous activity. All remaining muscles (as indicated in the following table) showed no evidence of electrical instability. INTERPRETATION  This is an abnormal electrodiagnostic examination. These findings may be consistent with:  1. Moderate median mononeuropathy at the left wrist (carpal tunnel syndrome)  2. C8 cervical radiculopathy on the left. This is based on abnormalities in the triceps(C6,7,8), EDC(C7,8), ADM(C8,T1) and FDI(C8,T1). CLINICAL INTERPRETATION  His symptoms of focal weakness are most likely due to left C8 radiculopathy. The CTS does not appear clinically relevant at this time.        HISTORY OF PRESENT ILLNESS  Maria G Barkley II is a 46 y.o. male. Pt presents today for LUE EMG for evaluation of LUE numbness and weakness that returned in November 2018 after he held his neck in a different position at work for a few days. Since then, he has significant intrinsic hand weakness to where he is unable to open his left hand and has noticed atrophy of his left triceps. He was initially injured in ~2013 after he hit his head. 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 He states that he was offered a surgical option in [place], but he was advised that there was a 50/50 chance that the surgery would cause paralysis. He then saw a surgeon in [facility] who advised he did not need surgery at the time. PAST MEDICAL HISTORY   Past Medical History:   Diagnosis Date    Arthritis     bilat knees    GERD (gastroesophageal reflux disease)     Neck injury about 2007    hit head on pipe, in [place], tx chiro, told he had HNP       Past Surgical History:   Procedure Laterality Date    FULL ESOPHAGEAL MANOMETRY  10/10/2018         HX HERNIA REPAIR  1992   . MEDICATIONS    Current Outpatient Medications   Medication Sig Dispense Refill    amLODIPine-benazepril (LOTREL) 5-20 mg per capsule TAKE 1 CAPSULE BY MOUTH EVERY DAY 30 Cap 2    acetaminophen (TYLENOL PO) Take  by mouth.  topiramate ER (TROKENDI XR) 25 mg capsule 1 tab po qhs for 1 week then increase to 2 tab po qhs thereafter. (Patient not taking: Reported on 3/5/2019) 60 Cap 0    gabapentin (NEURONTIN) 100 mg capsule Take 2 Caps by mouth three (3) times daily. TAKE 1 TABLET BY MOUTH 3 TIMES A DAY AS NEEDED FOR PAIN 90 Cap 1    naproxen sodium (ALEVE) 220 mg cap Take  by mouth.  diphenhydrAMINE (BENADRYL ALLERGY) 25 mg tablet Take 25 mg by mouth every six (6) hours as needed for Sleep.  omeprazole (PRILOSEC) 40 mg capsule TAKE ONE CAPSULE BY MOUTH TWICE A DAY  3    cyanocobalamin 1,000 mcg tablet Take 1,000 mcg by mouth daily.       pyridoxine, vitamin B6, (VITAMIN B-6) 100 mg tablet Take 100 mg by mouth daily.  glucosamine/chondr benz A sod (OSTEO BI-FLEX PO) Take  by mouth.  multivitamin (ONE A DAY) tablet Take 1 Tab by mouth daily.  ranitidine (ZANTAC) 150 mg tablet Take 300 mg by mouth two (2) times a day. ALLERGIES  No Known Allergies       SOCIAL HISTORY    Social History     Socioeconomic History    Marital status:      Spouse name: Not on file    Number of children: Not on file    Years of education: Not on file    Highest education level: Not on file   Tobacco Use    Smoking status: Never Smoker    Smokeless tobacco: Never Used   Substance and Sexual Activity    Alcohol use: No    Drug use: No       FAMILY HISTORY  Family History   Problem Relation Age of Onset    COPD Mother     Neuropathy Mother     No Known Problems Father     No Known Problems Sister     Diabetes Brother     GERD Brother     No Known Problems Brother     No Known Problems Sister          PHYSICAL EXAMINATION  Visit Vitals  /78   Pulse 72   Temp 97.9 °F (36.6 °C)   Resp 16   Ht 5' 3\" (1.6 m)   Wt 211 lb 6.4 oz (95.9 kg)   SpO2 96%   BMI 37.45 kg/m²       Pain Assessment  2/21/2019   Location of Pain Neck;Arm   Location Modifiers Left   Severity of Pain 0   Quality of Pain (No Data)   Quality of Pain Comment numbness, tingling   Duration of Pain -   Frequency of Pain Intermittent   Aggravating Factors -   Aggravating Factors Comment -   Limiting Behavior -   Relieving Factors -   Relieving Factors Comment -   Result of Injury -   Type of Injury -   Type of Injury Comment -           Constitutional:  Well developed, well nourished, in no acute distress. Psychiatric: Affect and mood are appropriate. Integumentary: No rashes or abrasions noted on exposed areas.         SPINE/MUSCULOSKELETAL EXAM    On brief examination: Severe intrinsic hand and triceps weakness on the L    MOTOR:      Biceps  Triceps Deltoids Wrist Ext Wrist Flex Hand Intrin   Right 5/5 5/5 5/5 5/5 5/5 5/5   Left 5/5 3/5 5/5 5/5 5/5 1/5             Hip Flex  Quads Hamstrings Ankle DF EHL Ankle PF   Right 5/5 5/5 5/5 5/5 5/5 5/5   Left 5/5 5/5 5/5 5/5 5/5 5/5     NCV & EMG Findings:  Nerve Conduction Studies  Anti Sensory Summary Table     Stim Site NR Peak (ms) Norm Peak (ms) O-P Amp (µV) Norm O-P Amp Site1 Site2 Delta-P (ms) Dist (cm) Kvng (m/s) Norm Kvng (m/s)   Left Median Anti Sensory (2nd Digit)   Wrist    3.6 <3.6 12.1 >10 Wrist 2nd Digit 3.6 14.0 39 >39   Left Radial Anti Sensory (Base 1st Digit)   Wrist    2.2 <3.1 13.5  Wrist Base 1st Digit 2.2 0.0     Left Ulnar Anti Sensory (5th Digit)   Wrist    2.6 <3.7 22.7 >15.0 Wrist 5th Digit 2.6 14.0 54 >38     Motor Summary Table     Stim Site NR Onset (ms) Norm Onset (ms) O-P Amp (mV) Norm O-P Amp Site1 Site2 Delta-0 (ms) Dist (cm) Kvng (m/s) Norm Kvng (m/s)   Left Median Motor (Abd Poll Brev)   Wrist    4.8 <4.2 8.4 >5 Elbow Wrist 3.4 19.0 56 >50   Elbow    8.2  7.7          Left Ulnar Motor (Abd Dig Min)   Wrist    3.0 <4.2 8.0 >3 B Elbow Wrist 2.5 0.0  >53   B Elbow    5.5  7.9  A Elbow B Elbow 1.8 0.0  >53   A Elbow    7.3  8.0            Comparison Summary Table     Stim Site NR Peak (ms) Norm Peak (ms) O-P Amp (µV) Site1 Site2 Delta-P (ms) Norm Delta (ms)   Left Median/Ulnar Dig IV Comparison (Digit 4 - 14cm)   Median Wr    4.2 <3.3 7.2 Median Wr Ulnar Wr 1.2 <0.4   Ulnar Wr    3.0 <3.3 8.4         EMG     Side Muscle Nerve Root Ins Act Fibs Psw Amp Dur Poly Recrt Int Pat Comment   Left Biceps Musculocut C5-6 Nml Nml Nml Nml Nml 0 Nml Nml    Left Triceps Radial C6-7-8 Nml Nml Nml Incr Nml 0 Nml Nml    Left PronatorTeres Median C6-7 Nml Nml Nml Nml Nml 0 Nml Nml    Left Ext Digitorum Radial (Post Int) C7-8 Incr 3+ 1+ Nml Nml 2+ Reduced Nml    Left Abd Poll Brev Median C8-T1 Nml Nml Nml Nml Nml 0 Nml Nml    Left Abd Dig Min Ulnar C8-T1 Incr 1+ 1+ Nml Nml 0 Reduced Nml    Left 1stDorInt Ulnar C8-T1 Incr 2+ Nml Nml Nml 0 Nml Nml    Left Cervical Parasp Up Rami C1-3 Nml Nml Nml         Left Cervical Parasp Mid Rami C4-6 Nml Nml Nml         Left Cervical Parasp Low Rami C7-8 Nml Nml Nml             Nerve Conduction Studies  Anti Sensory Left/Right Comparison     Stim Site L Lat (ms) R Lat (ms) L-R Lat (ms) L Amp (µV) R Amp (µV) L-R Amp (%) Site1 Site2 L Kvng (m/s) R Kvng (m/s) L-R Knvg (m/s)   Median Anti Sensory (2nd Digit)   Wrist 3.6   12.1   Wrist 2nd Digit 39     Radial Anti Sensory (Base 1st Digit)   Wrist 2.2   13.5   Wrist Base 1st Digit      Ulnar Anti Sensory (5th Digit)   Wrist 2.6   22.7   Wrist 5th Digit 54       Motor Left/Right Comparison     Stim Site L Lat (ms) R Lat (ms) L-R Lat (ms) L Amp (mV) R Amp (mV) L-R Amp (%) Site1 Site2 L Kvng (m/s) R Kvng (m/s) L-R Kvng (m/s)   Median Motor (Abd Poll Brev)   Wrist 4.8   8.4   Elbow Wrist 56     Elbow 8.2   7.7          Ulnar Motor (Abd Dig Min)   Wrist 3.0   8.0   B Elbow Wrist      B Elbow 5.5   7.9   A Elbow B Elbow      A Elbow 7.3   8.0            Comparison Left/Right Comparison     Stim Site L Lat (ms) R Lat (ms) L-R Lat (ms) L Amp (µV) R Amp (µV) L-R Amp (%)   Median/Ulnar Dig IV Comparison (Digit 4 - 14cm)   Median Wr 4.2   7.2     Ulnar Wr 3.0   8.4           Waveforms:                      [facility]  OFFICE PROCEDURE PROGRESS NOTE        Chart reviewed for the following:   Nahomy POND, have reviewed the History, Physical and updated the Allergic reactions for Carpenter's II     TIME OUT performed immediately prior to start of procedure:   Nahomy POND, have performed the following reviews on Carpenter's II prior to the start of the procedure:            * Patient was identified by name and date of birth   * Agreement on procedure being performed was verified  * Risks and Benefits explained to the patient  * Procedure site verified and marked as necessary  * Patient was positioned for comfort  * Consent was signed and verified     Time: 3:58pm      Date of procedure: 4/24/2019    Procedure performed by:  Carina Zavala MD    Provider accompanied by: Roxy. Patient accompanied by: Wife and 3 children.     How tolerated by patient: tolerated the procedure well with no complications    Post Procedural Pain Scale: 0 - No Hurt    Comments: none    Written by Fernando Browning, [address] as dictated by Darrian Crum MD

## 2019-04-30 ENCOUNTER — OFFICE VISIT (OUTPATIENT)
Dept: ORTHOPEDIC SURGERY | Age: 49
End: 2019-04-30

## 2019-04-30 VITALS
HEIGHT: 62 IN | OXYGEN SATURATION: 96 % | HEART RATE: 69 BPM | BODY MASS INDEX: 38.68 KG/M2 | TEMPERATURE: 98 F | WEIGHT: 210.2 LBS | SYSTOLIC BLOOD PRESSURE: 131 MMHG | RESPIRATION RATE: 16 BRPM | DIASTOLIC BLOOD PRESSURE: 81 MMHG

## 2019-04-30 DIAGNOSIS — R29.898 LEFT ARM WEAKNESS: ICD-10-CM

## 2019-04-30 DIAGNOSIS — M48.02 CERVICAL SPINAL STENOSIS: Primary | ICD-10-CM

## 2019-04-30 NOTE — PROGRESS NOTES
[address]  [address], [address]  [place], [address] [place] [address] Street  Phone: [phone]  Fax: [phone]  PROGRESS NOTE  Patient: Savita Hernandez                MRN: [id number]       SSN: [id number]  YOB: 1970        AGE: 46 y.o. SEX: male  There is no height or weight on file to calculate BMI. PCP: Shankar De Souza MD  04/30/19    No chief complaint on file. HISTORY OF PRESENT ILLNESS, RADIOGRAPHS, and PLAN:     HISTORY OF PRESENT ILLNESS:  Mr. Joesph Tolbert returns today. He is continuing this severe radiculopathy and intrinsic weakness. His EMG demonstrated a C8 loss in his hand, as well as some mild median neuropathy. The feeling of the electromyographer was that this is most likely a C8 radiculopathy, that and the stenosis I saw on his MRI, makes me feel the C7-T1 segment is most significant. I wanted to review his CT scan, but the imaging system was down, and it is not reviewable today. ASSESSMENT/PLAN: I will have him come back, and we will go over his images together to try to determine what the best approach would be, either anterior cervical decompression and fusion or a posterior cervical laminoforaminotomy at those two levels. I will see him back, and we will review his CT scan and MRIs together, and I will discuss with him the surgical options involved. cc: Bebe Aguilar M.D.          Past Medical History:   Diagnosis Date    Arthritis     bilat knees    GERD (gastroesophageal reflux disease)     Neck injury about 2007    hit head on pipe, in [place], tx chiro, told he had HNP       Family History   Problem Relation Age of Onset   [facility] COPD Mother     Neuropathy Mother     No Known Problems Father     No Known Problems Sister     Diabetes Brother     GERD Brother     No Known Problems Brother     No Known Problems Sister        Current Outpatient Medications   Medication Sig Dispense Refill    amLODIPine-benazepril (Ayesha Deep) 5-20 mg per capsule TAKE 1 CAPSULE BY MOUTH EVERY DAY 30 Cap 2    acetaminophen (TYLENOL PO) Take  by mouth.  topiramate ER (TROKENDI XR) 25 mg capsule 1 tab po qhs for 1 week then increase to 2 tab po qhs thereafter. (Patient not taking: Reported on 3/5/2019) 60 Cap 0    gabapentin (NEURONTIN) 100 mg capsule Take 2 Caps by mouth three (3) times daily. TAKE 1 TABLET BY MOUTH 3 TIMES A DAY AS NEEDED FOR PAIN 90 Cap 1    naproxen sodium (ALEVE) 220 mg cap Take  by mouth.  diphenhydrAMINE (BENADRYL ALLERGY) 25 mg tablet Take 25 mg by mouth every six (6) hours as needed for Sleep.  omeprazole (PRILOSEC) 40 mg capsule TAKE ONE CAPSULE BY MOUTH TWICE A DAY  3    cyanocobalamin 1,000 mcg tablet Take 1,000 mcg by mouth daily.  pyridoxine, vitamin B6, (VITAMIN B-6) 100 mg tablet Take 100 mg by mouth daily.  glucosamine/chondr benz A sod (OSTEO BI-FLEX PO) Take  by mouth.  multivitamin (ONE A DAY) tablet Take 1 Tab by mouth daily.  ranitidine (ZANTAC) 150 mg tablet Take 300 mg by mouth two (2) times a day.          No Known Allergies    Past Surgical History:   Procedure Laterality Date    FULL ESOPHAGEAL MANOMETRY  10/10/2018         HX HERNIA REPAIR  1992       Past Medical History:   Diagnosis Date    Arthritis     bilat knees    GERD (gastroesophageal reflux disease)     Neck injury about 2007    hit head on pipe, in [place], tx chiro, told he had HNP       Social History     Socioeconomic History    Marital status:      Spouse name: Not on file    Number of children: Not on file    Years of education: Not on file    Highest education level: Not on file   Occupational History    Not on file   Social Needs    Financial resource strain: Not on file    Food insecurity:     Worry: Not on file     Inability: Not on file    Transportation needs:     Medical: Not on file     Non-medical: Not on file   Tobacco Use    Smoking status: Never Smoker    Smokeless tobacco: Never Used Substance and Sexual Activity    Alcohol use: No    Drug use: No    Sexual activity: Not on file   Lifestyle    Physical activity:     Days per week: Not on file     Minutes per session: Not on file    Stress: Not on file   Relationships    Social connections:     Talks on phone: Not on file     Gets together: Not on file     Attends Faith service: Not on file     Active member of club or organization: Not on file     Attends meetings of clubs or organizations: Not on file     Relationship status: Not on file    Intimate partner violence:     Fear of current or ex partner: Not on file     Emotionally abused: Not on file     Physically abused: Not on file     Forced sexual activity: Not on file   Other Topics Concern    Not on file   Social History Narrative    Not on file         REVIEW OF SYSTEMS:   CONSTITUTIONAL SYMPTOMS:  Negative. EYES:  Negative. EARS, NOSE, THROAT AND MOUTH:  Negative. CARDIOVASCULAR:  Negative. RESPIRATORY:  Negative. GENITOURINARY: Per HPI. GASTROINTESTINAL:  Per HPI. INTEGUMENTARY (SKIN AND/OR BREAST):  Negative. MUSCULOSKELETAL: Per HPI.   ENDOCRINE/RHEUMATOLOGIC:  Negative. NEUROLOGICAL:  Per HPI. HEMATOLOGIC/LYMPHATIC:  Negative. ALLERGIC/IMMUNOLOGIC:  Negative. PSYCHIATRIC:  Negative. PHYSICAL EXAMINATION:   There were no vitals taken for this visit. PAIN SCALE: /10    CONSTITUTIONAL: The patient is in no apparent distress and is alert and oriented x 3. HEENT: Normocephalic. Hearing grossly intact. NECK: Supple and symmetric. no tenderness, or masses were felt. RESPIRATORY: No labored breathing. CARDIOVASCULAR: The carotid pulses were normal. Peripheral pulses were 2+. CHEST: Normal AP diameter and normal contour without any kyphoscoliosis. LYMPHATIC: No lymphadenopathy was appreciated in the neck, axillae or groin. SKIN: Negative for scars, rashes, lesions, or ulcers on the right upper, right lower, left upper, left lower and trunk. NEUROLOGICAL: Alert and oriented x 3. Ambulation without assistive device. FWB. EXTREMITIES: See musculoskeletal.  MUSCULOSKELETAL:   Head and Neck: Stiffness. Negative for misalignment, asymmetry, crepitation, defects, tenderness masses or effusions.  Left Upper Extremity: L shoulder pain radiating into fingers. Inspection, percussion and palpation performed. Mcclains sign is negative.  Right Upper Extremity: Weakness. Inspection, percussion and palpation performed. Mcclains sign is negative.  Spine, Ribs and Pelvis: Inspection, percussion and palpation performed. Negative for misalignment, asymmetry, crepitation, defects, tenderness masses or effusions.  Left Lower Extremity: Inspection, percussion and palpation performed. Negative straight leg raise.  Right Lower Extremity: Inspection, percussion and palpation performed. Negative straight leg raise. SPINE EXAM:     Cervical spine: Neck is midline. Normal muscle tone. No focal atrophy is noted. ASSESSMENT    ICD-10-CM ICD-9-CM    1. Cervical spinal stenosis M48.02 723.0    2. Left arm weakness R29.898 729.89        Written by Susanne Doshi, as dictated by Vilma Arteaga MD.    I, Dr. Vilma Arteaga MD, confirm that all documentation is accurate.

## 2019-05-03 ENCOUNTER — OFFICE VISIT (OUTPATIENT)
Dept: ORTHOPEDIC SURGERY | Age: 49
End: 2019-05-03

## 2019-05-03 VITALS
BODY MASS INDEX: 38.02 KG/M2 | RESPIRATION RATE: 16 BRPM | DIASTOLIC BLOOD PRESSURE: 81 MMHG | HEART RATE: 66 BPM | SYSTOLIC BLOOD PRESSURE: 125 MMHG | HEIGHT: 62 IN | TEMPERATURE: 98 F | WEIGHT: 206.6 LBS | OXYGEN SATURATION: 97 %

## 2019-05-03 DIAGNOSIS — M48.02 CERVICAL SPINAL STENOSIS: ICD-10-CM

## 2019-05-03 DIAGNOSIS — M48.02 CERVICAL SPINAL STENOSIS: Primary | ICD-10-CM

## 2019-05-03 NOTE — PROGRESS NOTES
[address]  [address], [address]  [address]  Phone: [phone]  Fax: [phone]  PROGRESS NOTE  Patient: Richard Lindsey                MRN: [id number]       SSN: [id number]  YOB: 1970        AGE: 46 y.o. SEX: male  Body mass index is 37.79 kg/m². PCP: Cecilia Johns MD  05/03/19    Chief Complaint   Patient presents with    Neck Pain     FU        HISTORY OF PRESENT ILLNESS, RADIOGRAPHS, and PLAN:     HISTORY OF PRESENT ILLNESS:  Mr. Erika Bah returns today. He has had his CT scan of his cervical spine and his EMG. The EMG demonstrates a C8 radiculopathy, cervical, and some carpal tunnel syndrome as well. The carpal tunnel appears to be nonspecific. It is the electromyographers sense that his symptoms are classically C8, and this is all coming from a cervical radiculopathy. In looking at his CT scan, he has significant foraminal stenosis at multiple levels. His bony stenosis is worse at C6-7. His MRI indicates ligamentous stenosis but also possible disc protrusion on the left at C7-T1 but mainly posteriorly-based ligamentous stenosis. He does not have much in the way of neck pain. It is mainly isolated arm pain. ASSESSMENT/PLAN: I discussed the matter at length with him. Given the appearance of his foraminal stenosis, the posterior ligamentous invagination with the foraminal stenosis all present on the left side with left-sided arthritic pathology at C7-T1 and C6-7 with the EMG findings and the concern I have about access and decompression at C7-T1 anteriorly, I think we would be best served by a posterior laminoforaminotomy at C6-7 and C7-T1 posteriorly. I discussed with him the risks, benefits, complications, and alternatives to this approach and the possibility that he could end up needing both approaches to adequately address his pain.   At this time, I would recommend the laminoforaminotomy with a medial facetectomy at C6-7 and Cy-T1 in an attempt to alleviate the symptoms present. The patient wishes to proceed. We will proceed with this surgery on the left at C6-7 and C7-T1 once the appropriate approvals and clearances take place. cc: Bebe Aguilar M.D. Past Medical History:   Diagnosis Date    Arthritis     bilat knees    GERD (gastroesophageal reflux disease)     Neck injury about 2007    hit head on pipe, in [place], tx chiro, told he had HNP       Family History   Problem Relation Age of Onset    COPD Mother     Neuropathy Mother     No Known Problems Father     No Known Problems Sister     Diabetes Brother     GERD Brother     No Known Problems Brother     No Known Problems Sister        Current Outpatient Medications   Medication Sig Dispense Refill    amLODIPine-benazepril (LOTREL) 5-20 mg per capsule TAKE 1 CAPSULE BY MOUTH EVERY DAY 30 Cap 2    acetaminophen (TYLENOL PO) Take  by mouth.  topiramate ER (TROKENDI XR) 25 mg capsule 1 tab po qhs for 1 week then increase to 2 tab po qhs thereafter. 60 Cap 0    gabapentin (NEURONTIN) 100 mg capsule Take 2 Caps by mouth three (3) times daily. TAKE 1 TABLET BY MOUTH 3 TIMES A DAY AS NEEDED FOR PAIN 90 Cap 1    naproxen sodium (ALEVE) 220 mg cap Take  by mouth.  diphenhydrAMINE (BENADRYL ALLERGY) 25 mg tablet Take 25 mg by mouth every six (6) hours as needed for Sleep.  omeprazole (PRILOSEC) 40 mg capsule TAKE ONE CAPSULE BY MOUTH TWICE A DAY  3    cyanocobalamin 1,000 mcg tablet Take 1,000 mcg by mouth daily.  pyridoxine, vitamin B6, (VITAMIN B-6) 100 mg tablet Take 100 mg by mouth daily.  glucosamine/chondr benz A sod (OSTEO BI-FLEX PO) Take  by mouth.  multivitamin (ONE A DAY) tablet Take 1 Tab by mouth daily.  ranitidine (ZANTAC) 150 mg tablet Take 300 mg by mouth two (2) times a day.          No Known Allergies    Past Surgical History:   Procedure Laterality Date    FULL ESOPHAGEAL MANOMETRY 10/10/2018         HX HERNIA REPAIR  1992       Past Medical History:   Diagnosis Date    Arthritis     bilat knees    GERD (gastroesophageal reflux disease)     Neck injury about 2007    hit head on pipe, in [place], tx chiro, told he had HNP       Social History     Socioeconomic History    Marital status:      Spouse name: Not on file    Number of children: Not on file    Years of education: Not on file    Highest education level: Not on file   Occupational History    Not on file   Social Needs    Financial resource strain: Not on file    Food insecurity:     Worry: Not on file     Inability: Not on file    Transportation needs:     Medical: Not on file     Non-medical: Not on file   Tobacco Use    Smoking status: Never Smoker    Smokeless tobacco: Never Used   Substance and Sexual Activity    Alcohol use: No    Drug use: No    Sexual activity: Not on file   Lifestyle    Physical activity:     Days per week: Not on file     Minutes per session: Not on file    Stress: Not on file   Relationships    Social connections:     Talks on phone: Not on file     Gets together: Not on file     Attends Mosque service: Not on file     Active member of club or organization: Not on file     Attends meetings of clubs or organizations: Not on file     Relationship status: Not on file    Intimate partner violence:     Fear of current or ex partner: Not on file     Emotionally abused: Not on file     Physically abused: Not on file     Forced sexual activity: Not on file   Other Topics Concern    Not on file   Social History Narrative    Not on file         REVIEW OF SYSTEMS:   CONSTITUTIONAL SYMPTOMS:  Negative. EYES:  Negative. EARS, NOSE, THROAT AND MOUTH:  Negative. CARDIOVASCULAR:  Negative. RESPIRATORY:  Negative. GENITOURINARY: Per HPI. GASTROINTESTINAL:  Per HPI. INTEGUMENTARY (SKIN AND/OR BREAST):  Negative.    MUSCULOSKELETAL: Per HPI.   ENDOCRINE/RHEUMATOLOGIC: Negative. NEUROLOGICAL:  Per HPI. HEMATOLOGIC/LYMPHATIC:  Negative. ALLERGIC/IMMUNOLOGIC:  Negative. PSYCHIATRIC:  Negative. PHYSICAL EXAMINATION:   Visit Vitals  /81   Pulse 66   Temp 98 °F (36.7 °C) (Oral)   Resp 16   Ht 5' 2\" (1.575 m)   Wt 206 lb 9.6 oz (93.7 kg)   SpO2 97%   BMI 37.79 kg/m²    PAIN SCALE: 5/10    CONSTITUTIONAL: The patient is in no apparent distress and is alert and oriented x 3. HEENT: Normocephalic. Hearing grossly intact. NECK: Supple and symmetric. no tenderness, or masses were felt. RESPIRATORY: No labored breathing. CARDIOVASCULAR: The carotid pulses were normal. Peripheral pulses were 2+. CHEST: Normal AP diameter and normal contour without any kyphoscoliosis. LYMPHATIC: No lymphadenopathy was appreciated in the neck, axillae or groin. SKIN:   Negative for scars, rashes, lesions, or ulcers on the right upper, right lower, left upper, left lower and trunk. NEUROLOGICAL: Alert and oriented x 3. Ambulation without assistive device. FWB. EXTREMITIES: See musculoskeletal.  MUSCULOSKELETAL:   Head and Neck: Neck pain radiating into LUE. Negative for misalignment, asymmetry, crepitation, defects, tenderness masses or effusions.  Left Upper Extremity: Pain and intrinsic weakness. Inspection, percussion and palpation performed. Mcclains sign is negative.  Right Upper Extremity: Inspection, percussion and palpation performed. Mcclains sign is negative.  Spine, Ribs and Pelvis: Inspection, percussion and palpation performed. Negative for misalignment, asymmetry, crepitation, defects, tenderness masses or effusions.  Left Lower Extremity: Inspection, percussion and palpation performed. Negative straight leg raise.  Right Lower Extremity: Inspection, percussion and palpation performed. Negative straight leg raise. SPINE EXAM:     Cervical spine: Neck is midline. Normal muscle tone. No focal atrophy is noted.       ASSESSMENT    ICD-10-CM ICD-9-CM    1. Cervical spinal stenosis M48.02 723.0        Written by Agusto Wynne, as dictated by Jose R Brown MD.    I, Dr. Jose R Brown MD, confirm that all documentation is accurate.

## 2019-07-02 NOTE — PATIENT INSTRUCTIONS
MRI of the Cervical Spine: About This Test  What is it? MRI (magnetic resonance imaging) is a test that uses a magnetic field and pulses of radio wave energy to make pictures of the organs and structures inside the body. An MRI can give your doctor information about the spine in your neck (the cervical spine). This can include the spine, the space around the spinal cord, and vertebrae in your neck. When you have an MRI, you lie on a table and the table moves into the MRI machine. Why is this test done? An MRI of the cervical spine can help find problems such as infection and tumors. It also can help diagnose narrowing of the spinal canal (spinal stenosis) and a herniated disc in the cervical spine. How can you prepare for the test?  Talk to your doctor about all your health conditions before the test. For example, tell your doctor if:  · You are allergic to any medicines. · You are or might be pregnant. · You have a pacemaker, an artificial limb, any metal pins or metal parts in your body, metal heart valves, metal clips in your brain, metal implants in your ears, or any other implanted or prosthetic medical device. · You have an intrauterine device (IUD) in place. · You get nervous in confined spaces. You may need medicine to help you relax. · You wear a patch that contains medicine. · You have kidney disease. What happens before the test?  · You will remove all metal objects, such as hearing aids, dentures, jewelry, watches, and hairpins. · You will take off all or most of your clothes and change into a gown. If you do leave some clothes on, make sure you take everything out of your pockets. · You may have contrast material (dye) put into your arm through a tube called an IV. Contrast material helps doctors see specific organs, blood vessels, and most tumors. What happens during the test?  · You will lie on your back on a table that is part of the MRI scanner.  Your head, chest, and arms may [id number] is 7 1%  This is a bit higher but not bad  Continue the same metformin for now  Stay active  Follow up in 6 months with blood work  be held with straps to help you remain still. · The table will slide into the space that contains the magnet. A device called a coil may be placed over or wrapped around your neck. · Inside the scanner you will hear a fan and feel air moving. You may hear tapping, thumping, or snapping noises. You may be given earplugs or headphones to reduce the noise. · You will be asked to hold still during the scan. You may be asked to hold your breath for short periods. · You may be alone in the scanning room, but a technologist will be watching you through a window and talking with you during the test.  What else should you know about the test?  · An MRI does not hurt. · You may feel warmth in the area being examined. This is normal.  · A dye (contrast material) that contains gadolinium may be used in this test. Be sure to tell your doctor if:  ? You are pregnant or think you may be pregnant. ? You have kidney problems. ? You've had more than one test that used gadolinium. · The U.S. Food and Drug Administration (FDA) has safety warnings about gadolinium. But for most people, the benefit of its use in this test outweighs the risk. · If a dye is used, you may feel a quick sting or pinch and some coolness when the IV is started. The dye may give you a metallic taste in your mouth. Some people feel sick to their stomach or get a headache. · If you breastfeed and are concerned about whether the dye used in this test is safe, talk to your doctor. Most experts believe that very little dye passes into breast milk and even less is passed on to the baby. But if you prefer, you can store some of your breast milk ahead of time and use it for a day or two after the test.  How long does the test take? · The test usually takes 30 to 60 minutes but can take as long as 2 hours.   What happens after the test?  · You will probably be able to go home right away, depending on the reason for the test.  · You can go back to your usual activities right away. Follow-up care is a key part of your treatment and safety. Be sure to make and go to all appointments, and call your doctor if you are having problems. It's also a good idea to keep a list of the medicines you take. Ask your doctor when you can expect to have your test results. Where can you learn more? Go to http://oni-lay.info/. Enter F223 in the search box to learn more about \"MRI of the Cervical Spine: About This Test.\"  Current as of: June 25, 2018  Content Version: 11.9  © 7878-0057 [employer]. Care instructions adapted under license by [employer] (which disclaims liability or warranty for this information). If you have questions about a medical condition or this instruction, always ask your healthcare professional. [address] any warranty or liability for your use of this information.

## 2019-07-06 DIAGNOSIS — I10 HYPERTENSION, UNCONTROLLED: ICD-10-CM

## 2019-07-08 ENCOUNTER — TELEPHONE (OUTPATIENT)
Dept: FAMILY MEDICINE CLINIC | Age: 49
End: 2019-07-08

## 2019-07-08 RX ORDER — AMLODIPINE AND BENAZEPRIL HYDROCHLORIDE 5; 20 MG/1; MG/1
CAPSULE ORAL
Qty: 30 CAP | Refills: 2 | Status: SHIPPED | OUTPATIENT
Start: 2019-07-08 | End: 2019-08-02 | Stop reason: SDUPTHER

## 2019-07-08 NOTE — TELEPHONE ENCOUNTER
Spoke with patients wife had her verify name and . Asked what was going on with patient today. Mrs Whit Tesfaye states patient walked in and said he had to sit down because he was having a hard time catching his breath she says she initially thought this was due to his sugar. She says about 20 mins later he began to have left sided chest pain and was still c/o SOB. She was advised to take patient to the ER for evaluation to rule out cardiac issues. She has expressed understanding and agrees with this plan.

## 2019-07-08 NOTE — TELEPHONE ENCOUNTER
Wife called regarding Mr. Donny Gordon trying to schedule an appointment. She states he has left sided chest pain. [facility]

## 2019-08-02 DIAGNOSIS — I10 HYPERTENSION, UNCONTROLLED: ICD-10-CM

## 2019-08-02 RX ORDER — AMLODIPINE AND BENAZEPRIL HYDROCHLORIDE 5; 20 MG/1; MG/1
CAPSULE ORAL
Qty: 30 CAP | Refills: 2 | Status: SHIPPED | OUTPATIENT
Start: 2019-08-02 | End: 2019-10-27 | Stop reason: SDUPTHER

## 2019-08-06 ENCOUNTER — OFFICE VISIT (OUTPATIENT)
Dept: FAMILY MEDICINE CLINIC | Age: 49
End: 2019-08-06

## 2019-08-06 DIAGNOSIS — Z12.11 COLON CANCER SCREENING: ICD-10-CM

## 2019-08-06 DIAGNOSIS — R53.82 CHRONIC FATIGUE: ICD-10-CM

## 2019-08-06 DIAGNOSIS — L03.116 CELLULITIS OF LEFT LEG: Primary | ICD-10-CM

## 2019-08-06 RX ORDER — AMOXICILLIN AND CLAVULANATE POTASSIUM 875; 125 MG/1; MG/1
1 TABLET, FILM COATED ORAL EVERY 12 HOURS
Qty: 20 TAB | Refills: 0 | Status: SHIPPED | OUTPATIENT
Start: 2019-08-06 | End: 2019-08-16

## 2019-08-06 NOTE — PATIENT INSTRUCTIONS

## 2019-08-06 NOTE — PROGRESS NOTES
Gerard Kansas RADHA is a 44 y.o. male  presents for leg lesion. He also has fatigue. No fever or chills. No Known Allergies    Patient Active Problem List   Diagnosis Code    Severe obesity (BMI 35.0-39. 9) E66.01    Left lumbar radiculitis M54.16    DDD (degenerative disc disease), lumbar M51.36    Chronic cluster headache, not intractable G44.029    Hypertension, uncontrolled I10     Past Medical History:   Diagnosis Date    Arthritis     bilat knees    GERD (gastroesophageal reflux disease)     Neck injury about 2007    hit head on pipe, in [place], tx chiro, told he had HNP     Social History     Socioeconomic History    Marital status:      Spouse name: Not on file    Number of children: Not on file    Years of education: Not on file    Highest education level: Not on file   Tobacco Use    Smoking status: Never Smoker    Smokeless tobacco: Never Used   Substance and Sexual Activity    Alcohol use: No    Drug use: No     Family History   Problem Relation Age of Onset    COPD Mother     Neuropathy Mother     No Known Problems Father     No Known Problems Sister     Diabetes Brother     GERD Brother     No Known Problems Brother     No Known Problems Sister         Review of Systems   Constitutional: Negative for chills, fever, malaise/fatigue and weight loss. Eyes: Negative for blurred vision. Respiratory: Negative for shortness of breath and wheezing. Cardiovascular: Negative for chest pain. Gastrointestinal: Negative for nausea and vomiting. Musculoskeletal: Negative for myalgias. Skin: Negative for itching and rash. Neurological: Negative for weakness. There were no vitals filed for this visit. Physical Exam   Constitutional: He is oriented to person, place, and time and well-developed, well-nourished, and in no distress. Neck: Normal range of motion. Neck supple. No thyromegaly present.    Cardiovascular: Normal rate, regular rhythm and normal heart sounds. Pulmonary/Chest: Effort normal and breath sounds normal.   Musculoskeletal: Normal range of motion. Neurological: He is alert and oriented to person, place, and time. Skin: Skin is warm and dry. There is erythema. Nursing note and vitals reviewed. Assessment/Plan      ICD-10-CM ICD-9-CM    1. Cellulitis of left leg L03.116 682.6 amoxicillin-clavulanate (AUGMENTIN) 875-125 mg per tablet      CBC WITH AUTOMATED DIFF      CBC WITH AUTOMATED DIFF   2. Chronic fatigue R53.82 780.79 VITAMIN B12      VITAMIN D, 25 HYDROXY      TSH AND FREE T4      METABOLIC PANEL, COMPREHENSIVE      CBC WITH AUTOMATED DIFF      CBC WITH AUTOMATED DIFF      VITAMIN B12      VITAMIN D, 25 HYDROXY      TSH AND FREE T4      METABOLIC PANEL, COMPREHENSIVE   3. Colon cancer screening Z12.11 V76.51 OCCULT BLOOD IMMUNOASSAY,DIAGNOSTIC     I have discussed the diagnosis with the patient and the intended plan of care as seen in the above orders. The patient has received an after-visit summary and questions were answered concerning future plans. I have discussed medication, side effects, and warnings with the patient in detail. The patient verbalized understanding and is in agreement with the plan of care. The patient will contact the office with any additional concerns.     lab results and schedule of future lab studies reviewed with patient    Jonel Stern MD

## 2019-08-07 LAB
25(OH)D3 SERPL-MCNC: 45.5 NG/ML (ref 32–100)
A-G RATIO,AGRAT: 2.4 RATIO (ref 1.1–2.6)
ABSOLUTE LYMPHOCYTE COUNT, 10803: 1.6 K/UL (ref 1–4.8)
ALBUMIN SERPL-MCNC: 5.1 G/DL (ref 3.5–5)
ALP SERPL-CCNC: 90 U/L (ref 25–115)
ALT SERPL-CCNC: 39 U/L (ref 5–40)
ANION GAP SERPL CALC-SCNC: 14 MMOL/L
AST SERPL W P-5'-P-CCNC: 29 U/L (ref 10–37)
BASOPHILS # BLD: 0 K/UL (ref 0–0.2)
BASOPHILS NFR BLD: 1 % (ref 0–2)
BILIRUB SERPL-MCNC: 0.5 MG/DL (ref 0.2–1.2)
BUN SERPL-MCNC: 27 MG/DL (ref 6–22)
CALCIUM SERPL-MCNC: 10.2 MG/DL (ref 8.4–10.4)
CHLORIDE SERPL-SCNC: 104 MMOL/L (ref 98–110)
CO2 SERPL-SCNC: 23 MMOL/L (ref 20–32)
CREAT SERPL-MCNC: 1.1 MG/DL (ref 0.5–1.2)
EOSINOPHIL # BLD: 0.1 K/UL (ref 0–0.5)
EOSINOPHIL NFR BLD: 2 % (ref 0–6)
ERYTHROCYTE [DISTWIDTH] IN BLOOD BY AUTOMATED COUNT: 14.1 % (ref 10–15.5)
GFRAA, 66117: >60
GFRNA, 66118: >60
GLOBULIN,GLOB: 2.1 G/DL (ref 2–4)
GLUCOSE SERPL-MCNC: 108 MG/DL (ref 70–99)
GRANULOCYTES,GRANS: 67 % (ref 40–75)
HCT VFR BLD AUTO: 45.8 % (ref 39.3–51.6)
HGB BLD-MCNC: 14.4 G/DL (ref 13.1–17.2)
LYMPHOCYTES, LYMLT: 22 % (ref 20–45)
MCH RBC QN AUTO: 27 PG (ref 26–34)
MCHC RBC AUTO-ENTMCNC: 31 G/DL (ref 31–36)
MCV RBC AUTO: 87 FL (ref 80–95)
MONOCYTES # BLD: 0.7 K/UL (ref 0.1–1)
MONOCYTES NFR BLD: 9 % (ref 3–12)
NEUTROPHILS # BLD AUTO: 4.9 K/UL (ref 1.8–7.7)
PLATELET # BLD AUTO: 236 K/UL (ref 140–440)
PMV BLD AUTO: 11.5 FL (ref 9–13)
POTASSIUM SERPL-SCNC: 4.5 MMOL/L (ref 3.5–5.5)
PROT SERPL-MCNC: 7.2 G/DL (ref 6.4–8.3)
RBC # BLD AUTO: 5.26 M/UL (ref 3.8–5.8)
SODIUM SERPL-SCNC: 141 MMOL/L (ref 133–145)
T4 FREE SERPL-MCNC: 1.1 NG/DL (ref 0.9–1.8)
TSH SERPL DL<=0.005 MIU/L-ACNC: 2.15 MCU/ML (ref 0.27–4.2)
VIT B12 SERPL-MCNC: 1447 PG/ML (ref 211–911)
WBC # BLD AUTO: 7.3 K/UL (ref 4–11)

## 2019-08-12 ENCOUNTER — TELEPHONE (OUTPATIENT)
Dept: FAMILY MEDICINE CLINIC | Age: 49
End: 2019-08-12

## 2019-08-14 ENCOUNTER — TELEPHONE (OUTPATIENT)
Dept: FAMILY MEDICINE CLINIC | Age: 49
End: 2019-08-14

## 2019-08-14 NOTE — PROGRESS NOTES
Pt's wife Mrs. Shakeel Galeas was made aware of pt's labs. She asked why the pt could still be so sleepy? I asked her if patient snores and tosses and turns during the night. She said that he does. I said I could speak to dr Gabriel Cook about a possible sleep referral. She asked Mr. Mirna Sims if he would like to see a sleep dr. I heard him say \"I'm not wearing one of those masks'. Does not want sleep referral at this time.

## 2019-08-14 NOTE — TELEPHONE ENCOUNTER
Pt's wife states that Mr. Maria G Humphries states the gabapentin is too strong and would like to stop taking it. He was wondering if he could get a 'high dose of ibuprofen' instead. I told her that I would send dr Srinivas Marshall a message and call back once I get answer.

## 2019-08-16 RX ORDER — IBUPROFEN 800 MG/1
800 TABLET ORAL
Qty: 60 TAB | Refills: 1 | Status: SHIPPED | OUTPATIENT
Start: 2019-08-16 | End: 2019-11-08 | Stop reason: SDUPTHER

## 2019-08-19 NOTE — TELEPHONE ENCOUNTER
Late entry    Spoke to patient's wife last week. She is aware that med has been sent to pharmacy. She was also given lab results. She said that pt is still tired. I asked her if patient has hx sleep apnea or if he snores/tosses and turns during the night. She said that he does, but that she knows he won't wear a CPAP. I heard patient in the background say 'I'm not wearing that'. I told her that this could be a possible cause of him feeling tired. If he changes his mind about the referral for sleep med they can contact the office. She expressed understanding.

## 2019-08-26 ENCOUNTER — OFFICE VISIT (OUTPATIENT)
Dept: FAMILY MEDICINE CLINIC | Age: 49
End: 2019-08-26

## 2019-08-26 VITALS
BODY MASS INDEX: 37.73 KG/M2 | DIASTOLIC BLOOD PRESSURE: 72 MMHG | OXYGEN SATURATION: 96 % | RESPIRATION RATE: 16 BRPM | WEIGHT: 205 LBS | TEMPERATURE: 98.4 F | SYSTOLIC BLOOD PRESSURE: 126 MMHG | HEIGHT: 62 IN | HEART RATE: 74 BPM
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
## 2019-08-26 RX ORDER — AMOXICILLIN AND CLAVULANATE POTASSIUM 875; 125 MG/1; MG/1
1 TABLET, FILM COATED ORAL 2 TIMES DAILY
Qty: 20 TAB | Refills: 0 | Status: SHIPPED | OUTPATIENT
Start: 2019-08-26 | End: 2019-09-05

## 2019-08-26 NOTE — PROGRESS NOTES
Jamar Mason II is a 44 y.o. male presents in office for    Chief Complaint   Patient presents with   [address]     right forearm        Visit Vitals  /72 (BP 1 Location: Left arm, BP Patient Position: Sitting)   Pulse 74   Temp 98.4 °F (36.9 °C) (Oral)   Resp 16   Ht 5' 2\" (1.575 m)   Wt 205 lb (93 kg)   SpO2 96%   BMI 37.49 kg/m²         Health Maintenance Due   Topic Date Due    DTaP/Tdap/Td series (1 - Tdap) 06/29/1989    Shingrix Vaccine Age 50> (1 of 2) 06/29/2018    FOBT Q 1 YEAR AGE 50-75  06/29/2018         1. Have you been to the ER, urgent care clinic since your last visit? Hospitalized since your last visit? No    2. Have you seen or consulted any other health care providers outside of the [address] [place] [address] since your last visit? Include any pap smears or colon screening.  No     Learning Assessment 8/26/2019   PRIMARY LEARNER Patient   HIGHEST LEVEL OF EDUCATION - PRIMARY LEARNER  GRADUATED HIGH SCHOOL OR GED   BARRIERS PRIMARY LEARNER NONE   CO-LEARNER CAREGIVER No   PRIMARY LANGUAGE ENGLISH   LEARNER PREFERENCE PRIMARY DEMONSTRATION   ANSWERED BY patient   RELATIONSHIP SELF

## 2019-08-26 NOTE — PROGRESS NOTES
Ayaka Willett II is a 44 y.o. male  presents for infected insect bite on right forearm  He has had sxs for about 3 days. It is getting worse. He has assoc drainage and redness. No Known Allergies  Outpatient Medications Marked as Taking for the 8/26/19 encounter (Office Visit) with Lexi Robert MD   Medication Sig Dispense Refill    ibuprofen (MOTRIN) 800 mg tablet Take 1 Tab by mouth every eight (8) hours as needed for Pain. 60 Tab 1    amLODIPine-benazepril (LOTREL) 5-20 mg per capsule TAKE 1 CAPSULE BY MOUTH EVERY DAY 30 Cap 2    topiramate ER (TROKENDI XR) 25 mg capsule 1 tab po qhs for 1 week then increase to 2 tab po qhs thereafter. 60 Cap 0    omeprazole (PRILOSEC) 40 mg capsule TAKE ONE CAPSULE BY MOUTH TWICE A DAY  3    cyanocobalamin 1,000 mcg tablet Take 1,000 mcg by mouth daily.  glucosamine/chondr benz A sod (OSTEO BI-FLEX PO) Take  by mouth.  multivitamin (ONE A DAY) tablet Take 1 Tab by mouth daily.  ranitidine (ZANTAC) 150 mg tablet Take 300 mg by mouth two (2) times a day. Patient Active Problem List   Diagnosis Code    Severe obesity (BMI 35.0-39. 9) E66.01    Left lumbar radiculitis M54.16    DDD (degenerative disc disease), lumbar M51.36    Chronic cluster headache, not intractable G44.029    Hypertension, uncontrolled I10     Past Medical History:   Diagnosis Date    Arthritis     bilat knees    GERD (gastroesophageal reflux disease)     Neck injury about 2007    hit head on pipe, in [place], tx chiro, told he had HNP     Social History     Socioeconomic History    Marital status:      Spouse name: Not on file    Number of children: Not on file    Years of education: Not on file    Highest education level: Not on file   Tobacco Use    Smoking status: Never Smoker    Smokeless tobacco: Never Used   Substance and Sexual Activity    Alcohol use: No    Drug use: No     Family History   Problem Relation Age of Onset    COPD Mother    [facility] Neuropathy Mother     No Known Problems Father     No Known Problems Sister     Diabetes Brother     GERD Brother     No Known Problems Brother     No Known Problems Sister         Review of Systems   Constitutional: Negative for chills, fever, malaise/fatigue and weight loss. Eyes: Negative for blurred vision. Respiratory: Negative for shortness of breath and wheezing. Cardiovascular: Negative for chest pain. Gastrointestinal: Negative for nausea and vomiting. Musculoskeletal: Negative for myalgias. Skin: Positive for itching. Negative for rash. Neurological: Negative for weakness. Vitals:    08/26/19 1324   BP: 126/72   Pulse: 74   Resp: 16   Temp: 98.4 °F (36.9 °C)   TempSrc: Oral   SpO2: 96%   Weight: 205 lb (93 kg)   Height: 5' 2\" (1.575 m)   PainSc:   0 - No pain       Physical Exam   Constitutional: He is oriented to person, place, and time and well-developed, well-nourished, and in no distress. Neck: Normal range of motion. Neck supple. No thyromegaly present. Cardiovascular: Normal rate, regular rhythm and normal heart sounds. Pulmonary/Chest: Effort normal and breath sounds normal.   Musculoskeletal: Normal range of motion. Neurological: He is alert and oriented to person, place, and time. Skin: Skin is warm and dry. No rash noted. There is erythema. No pallor. Nursing note and vitals reviewed. Assessment/Plan      ICD-10-CM ICD-9-CM    1. Bug bite with infection, initial encounter W57. XXXA 919.5 amoxicillin-clavulanate (AUGMENTIN) 875-125 mg per tablet     E906.4      I have discussed the diagnosis with the patient and the intended plan of care as seen in the above orders. The patient has received an after-visit summary and questions were answered concerning future plans. I have discussed medication, side effects, and warnings with the patient in detail. The patient verbalized understanding and is in agreement with the plan of care.  The patient will contact the office with any additional concerns.     lab results and schedule of future lab studies reviewed with patient    Michele Ortiz MD

## 2019-08-26 NOTE — PATIENT INSTRUCTIONS

## 2019-09-16 ENCOUNTER — OFFICE VISIT (OUTPATIENT)
Dept: FAMILY MEDICINE CLINIC | Age: 49
End: 2019-09-16

## 2019-09-16 VITALS
RESPIRATION RATE: 12 BRPM | WEIGHT: 202 LBS | OXYGEN SATURATION: 98 % | HEIGHT: 62 IN | DIASTOLIC BLOOD PRESSURE: 84 MMHG | TEMPERATURE: 97.7 F | HEART RATE: 81 BPM | BODY MASS INDEX: 37.17 KG/M2 | SYSTOLIC BLOOD PRESSURE: 138 MMHG

## 2019-09-16 DIAGNOSIS — S40.862A INSECT BITE OF LEFT UPPER EXTREMITY, INITIAL ENCOUNTER: Primary | ICD-10-CM
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
## 2019-09-16 RX ORDER — AMOXICILLIN AND CLAVULANATE POTASSIUM 875; 125 MG/1; MG/1
1 TABLET, FILM COATED ORAL 2 TIMES DAILY
Qty: 20 TAB | Refills: 0 | Status: SHIPPED | OUTPATIENT
Start: 2019-09-16 | End: 2019-09-26

## 2019-09-16 NOTE — PROGRESS NOTES
Chief Complaint   Patient presents with   [address]     Pt states he believes that he was bit by a spider on Thursday. He has a red raised area near left elbow. States he is beginning to have a little pain and that the redness is starting to spread.

## 2019-09-16 NOTE — PATIENT INSTRUCTIONS

## 2019-09-16 NOTE — PROGRESS NOTES
Keiko Garcia II is a 44 y.o. male  presents for left elbow. He has assoc redness no drainage. Has had sxs for about 4 days. He has assoc redness and pain. No Known Allergies  Outpatient Medications Marked as Taking for the 9/16/19 encounter (Office Visit) with Claudio Wynne MD   Medication Sig Dispense Refill    ibuprofen (MOTRIN) 800 mg tablet Take 1 Tab by mouth every eight (8) hours as needed for Pain. 60 Tab 1    amLODIPine-benazepril (LOTREL) 5-20 mg per capsule TAKE 1 CAPSULE BY MOUTH EVERY DAY 30 Cap 2    acetaminophen (TYLENOL PO) Take  by mouth.  topiramate ER (TROKENDI XR) 25 mg capsule 1 tab po qhs for 1 week then increase to 2 tab po qhs thereafter. 60 Cap 0    gabapentin (NEURONTIN) 100 mg capsule Take 2 Caps by mouth three (3) times daily. TAKE 1 TABLET BY MOUTH 3 TIMES A DAY AS NEEDED FOR PAIN 90 Cap 1    diphenhydrAMINE (BENADRYL ALLERGY) 25 mg tablet Take 25 mg by mouth every six (6) hours as needed for Sleep.  omeprazole (PRILOSEC) 40 mg capsule TAKE ONE CAPSULE BY MOUTH TWICE A DAY  3    cyanocobalamin 1,000 mcg tablet Take 1,000 mcg by mouth daily.  pyridoxine, vitamin B6, (VITAMIN B-6) 100 mg tablet Take 100 mg by mouth daily.  glucosamine/chondr benz A sod (OSTEO BI-FLEX PO) Take  by mouth.  ranitidine (ZANTAC) 150 mg tablet Take 300 mg by mouth two (2) times a day. Patient Active Problem List   Diagnosis Code    Severe obesity (BMI 35.0-39. 9) E66.01    Left lumbar radiculitis M54.16    DDD (degenerative disc disease), lumbar M51.36    Chronic cluster headache, not intractable G44.029    Hypertension, uncontrolled I10     Past Medical History:   Diagnosis Date    Arthritis     bilat knees    GERD (gastroesophageal reflux disease)     Neck injury about 2007    hit head on pipe, in [place], tx chiro, told he had HNP     Social History     Socioeconomic History    Marital status:      Spouse name: Not on file    Number of children: Not on file    Years of education: Not on file    Highest education level: Not on file   Tobacco Use    Smoking status: Never Smoker    Smokeless tobacco: Never Used   Substance and Sexual Activity    Alcohol use: No    Drug use: No     Family History   Problem Relation Age of Onset    COPD Mother     Neuropathy Mother     No Known Problems Father     No Known Problems Sister     Diabetes Brother     GERD Brother     No Known Problems Brother     No Known Problems Sister         Review of Systems   Constitutional: Negative for chills, fever, malaise/fatigue and weight loss. Eyes: Negative for blurred vision. Respiratory: Negative for shortness of breath and wheezing. Cardiovascular: Negative for chest pain. Gastrointestinal: Negative for nausea and vomiting. Musculoskeletal: Negative for myalgias. Skin: Negative for rash. Neurological: Negative for weakness. Vitals:    09/16/19 1015   BP: 138/84   Pulse: 81   Resp: 12   Temp: 97.7 °F (36.5 °C)   SpO2: 98%   Weight: 202 lb (91.6 kg)   Height: 5' 2\" (1.575 m)   PainSc:   0 - No pain       Physical Exam   Constitutional: He is oriented to person, place, and time and well-developed, well-nourished, and in no distress. Neck: Normal range of motion. Neck supple. No thyromegaly present. Cardiovascular: Normal rate, regular rhythm and normal heart sounds. Pulmonary/Chest: Effort normal and breath sounds normal.   Musculoskeletal: Normal range of motion. Neurological: He is alert and oriented to person, place, and time. Skin: Skin is warm and dry. No rash noted. There is erythema. No pallor. Nursing note and vitals reviewed. left forearm with tenderness. No drainage. Assessment/Plan      ICD-10-CM ICD-9-CM    1. Insect bite of left upper extremity, initial encounter S40.862A 913.4 amoxicillin-clavulanate (AUGMENTIN) 875-125 mg per tablet    W57. Sharene Levo A419.5      I have discussed the diagnosis with the patient and the intended plan of care as seen in the above orders. The patient has received an after-visit summary and questions were answered concerning future plans. I have discussed medication, side effects, and warnings with the patient in detail. The patient verbalized understanding and is in agreement with the plan of care. The patient will contact the office with any additional concerns.         lab results and schedule of future lab studies reviewed with patient    Robyn Quinteros MD

## 2019-09-19 ENCOUNTER — OFFICE VISIT (OUTPATIENT)
Dept: FAMILY MEDICINE CLINIC | Age: 49
End: 2019-09-19
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
## 2019-09-19 DIAGNOSIS — S40.862A INSECT BITE OF LEFT UPPER EXTREMITY, INITIAL ENCOUNTER: Primary | ICD-10-CM

## 2019-09-19 RX ORDER — SULFAMETHOXAZOLE AND TRIMETHOPRIM 800; 160 MG/1; MG/1
1 TABLET ORAL 2 TIMES DAILY
Qty: 20 TAB | Refills: 0 | Status: SHIPPED | OUTPATIENT
Start: 2019-09-19 | End: 2019-09-29

## 2019-09-19 NOTE — PATIENT INSTRUCTIONS

## 2019-09-19 NOTE — PROGRESS NOTES
Luca Vickers II is a 44 y.o. male  presents for swelling and drainage from left forearm. States that sxs got worse and he had it drained. He also has assoc swelling and redness. No fever or chills     No Known Allergies  Outpatient Medications Marked as Taking for the 9/19/19 encounter (Office Visit) with Stefany Soto MD   Medication Sig Dispense Refill    trimethoprim-sulfamethoxazole (BACTRIM DS, SEPTRA DS) 160-800 mg per tablet Take 1 Tab by mouth two (2) times a day for 10 days. 20 Tab 0     Patient Active Problem List   Diagnosis Code    Severe obesity (BMI 35.0-39. 9) E66.01    Left lumbar radiculitis M54.16    DDD (degenerative disc disease), lumbar M51.36    Chronic cluster headache, not intractable G44.029    Hypertension, uncontrolled I10     Past Medical History:   Diagnosis Date    Arthritis     bilat knees    GERD (gastroesophageal reflux disease)     Neck injury about 2007    hit head on pipe, in [place], tx chiro, told he had HNP     Social History     Socioeconomic History    Marital status:      Spouse name: Not on file    Number of children: Not on file    Years of education: Not on file    Highest education level: Not on file   Tobacco Use    Smoking status: Never Smoker    Smokeless tobacco: Never Used   Substance and Sexual Activity    Alcohol use: No    Drug use: No     Family History   Problem Relation Age of Onset    COPD Mother     Neuropathy Mother     No Known Problems Father     No Known Problems Sister     Diabetes Brother     GERD Brother     No Known Problems Brother     No Known Problems Sister         Review of Systems   Constitutional: Negative for chills and fever. Skin: Negative for itching and rash (redness with swelling). There were no vitals filed for this visit. Physical Exam   Musculoskeletal: He exhibits tenderness. Neurological: He is alert. Skin: There is erythema. Firm red no drainage on forearm on left.     Nursing note and vitals reviewed. Assessment/Plan      ICD-10-CM ICD-9-CM    1. Insect bite of left upper extremity, initial encounter S40.862A 913.4 trimethoprim-sulfamethoxazole (BACTRIM DS, SEPTRA DS) 160-800 mg per tablet    W57. Kisha Lockhart E906.4 REFERRAL TO INFECTIOUS DISEASE     Follow-up and Dispositions    · Return in about 1 week (around 9/26/2019). Go to ER if sxs increase before seeing ID    I have discussed the diagnosis with the patient and the intended plan of care as seen in the above orders. The patient has received an after-visit summary and questions were answered concerning future plans. I have discussed medication, side effects, and warnings with the patient in detail. The patient verbalized understanding and is in agreement with the plan of care. The patient will contact the office with any additional concerns.     lab results and schedule of future lab studies reviewed with patient    Augusto Zamarripa MD

## 2019-10-27 DIAGNOSIS — I10 HYPERTENSION, UNCONTROLLED: ICD-10-CM

## 2019-10-28 RX ORDER — AMLODIPINE AND BENAZEPRIL HYDROCHLORIDE 5; 20 MG/1; MG/1
CAPSULE ORAL
Qty: 90 CAP | Refills: 0 | Status: SHIPPED | OUTPATIENT
Start: 2019-10-28 | End: 2019-12-06 | Stop reason: SDUPTHER

## 2019-11-08 ENCOUNTER — OFFICE VISIT (OUTPATIENT)
Dept: FAMILY MEDICINE CLINIC | Age: 49
End: 2019-11-08

## 2019-11-08 VITALS
WEIGHT: 205 LBS | HEIGHT: 62 IN | DIASTOLIC BLOOD PRESSURE: 68 MMHG | OXYGEN SATURATION: 96 % | RESPIRATION RATE: 14 BRPM | HEART RATE: 68 BPM | TEMPERATURE: 98.5 F | BODY MASS INDEX: 37.73 KG/M2 | SYSTOLIC BLOOD PRESSURE: 136 MMHG

## 2019-11-08 DIAGNOSIS — L03.113 CELLULITIS OF RIGHT UPPER EXTREMITY: Primary | ICD-10-CM

## 2019-11-08 RX ORDER — CEPHALEXIN 500 MG/1
500 CAPSULE ORAL 3 TIMES DAILY
Qty: 21 CAP | Refills: 0 | Status: SHIPPED | OUTPATIENT
Start: 2019-11-08 | End: 2019-11-15

## 2019-11-08 RX ORDER — HYDROGEN PEROXIDE 3 %
SOLUTION, NON-ORAL MISCELLANEOUS DAILY
COMMUNITY

## 2019-11-08 RX ORDER — IBUPROFEN 800 MG/1
800 TABLET ORAL
Qty: 30 TAB | Refills: 0 | Status: SHIPPED | OUTPATIENT
Start: 2019-11-08 | End: 2021-12-01

## 2019-11-08 NOTE — PROGRESS NOTES
Patient states he thinks he was bit by a spider on his right wrist, he noticed this 2 days ago. The area is now red, swollen and painful per patient. He says this began as a small \"zit\" with a hole in the middle. Had some mild clear drainage. 1. Have you been to the ER, urgent care clinic since your last visit? Hospitalized since your last visit? No  2. Have you seen or consulted any other health care providers outside of the [address] [place] [address] since your last visit? Include any pap smears or colon screening. No    Medication reconciliation has been completed with patient. Care team discussed/updated as well as pharmacy.     Health Maintenance Due   Topic Date Due    DTaP/Tdap/Td series (1 - Tdap) 06/29/1989    Shingrix Vaccine Age 50> (1 of 2) 06/29/2018    FOBT Q 1 YEAR AGE 50-75  06/29/2018    Influenza Age 9 to Adult  08/01/2019

## 2019-11-08 NOTE — PATIENT INSTRUCTIONS

## 2019-11-08 NOTE — PROGRESS NOTES
Stephie Sagastume II is a 44 y.o. male who was seen in clinic today (11/8/2019). Assessment & Plan:   Diagnoses and all orders for this visit:    1. Cellulitis of right upper extremity  -     cephALEXin (KEFLEX) 500 mg capsule; Take 1 Cap by mouth three (3) times daily for 7 days. -     ibuprofen (MOTRIN) 800 mg tablet; Take 1 Tab by mouth every eight (8) hours as needed for Pain. Systemic symptoms and continued spread with worsening pain suggestive of infection    Influenza vaccination was recommended in accordance with CDC guidelines. He declined. I encouraged him to reconsider and return should he change his mind. Follow-up and Dispositions    · Return if symptoms worsen or fail to improve. Subjective:   Stephie Sagastume II was seen today for Insect Bite  Patient of Dr. Cooper Vazquez     Lesion right wrist noticed 2 days ago. Thinks he was bit by a spider while moving a wood pile where many were visible. Red, swollen and painful. began as a small \"zit\" with a hole in the middle. Had some mild clear drainage. Gradually worsening    Lab Results   Component Value Date/Time    Sodium 141 08/06/2019 03:26 PM    Potassium 4.5 08/06/2019 03:26 PM    Chloride 104 08/06/2019 03:26 PM    CO2 23 08/06/2019 03:26 PM    Anion gap 14.0 08/06/2019 03:26 PM    Glucose 108 (H) 08/06/2019 03:26 PM    BUN 27 (H) 08/06/2019 03:26 PM    Creatinine 1.1 08/06/2019 03:26 PM    Calcium 10.2 08/06/2019 03:26 PM          Prior to Admission medications    Medication Sig Start Date End Date Taking? Authorizing Provider   esomeprazole (NEXIUM) 20 mg capsule Take  by mouth daily. Yes Provider, Historical   amLODIPine-benazepril (LOTREL) 5-20 mg per capsule TAKE 1 CAPSULE BY MOUTH EVERY DAY 10/28/19  Yes Esther Barillas MD   ibuprofen (MOTRIN) 800 mg tablet Take 1 Tab by mouth every eight (8) hours as needed for Pain. 8/16/19  Yes Esther Barillas MD   acetaminophen (TYLENOL PO) Take  by mouth.    Yes Provider, Historical   diphenhydrAMINE (BENADRYL ALLERGY) 25 mg tablet Take 25 mg by mouth every six (6) hours as needed for Sleep. Yes Provider, Historical   omeprazole (PRILOSEC) 40 mg capsule TAKE ONE CAPSULE BY MOUTH TWICE A DAY 8/11/18  Yes Provider, Historical   cyanocobalamin 1,000 mcg tablet Take 1,000 mcg by mouth daily. Yes Provider, Historical   pyridoxine, vitamin B6, (VITAMIN B-6) 100 mg tablet Take 100 mg by mouth daily. Yes Provider, Historical   glucosamine/chondr benz A sod (OSTEO BI-FLEX PO) Take  by mouth. Yes Provider, Historical   multivitamin (ONE A DAY) tablet Take 1 Tab by mouth daily. Yes Provider, Historical   topiramate ER (TROKENDI XR) 25 mg capsule 1 tab po qhs for 1 week then increase to 2 tab po qhs thereafter. 1/24/19   Jerry Devi MD   gabapentin (NEURONTIN) 100 mg capsule Take 2 Caps by mouth three (3) times daily. TAKE 1 TABLET BY MOUTH 3 TIMES A DAY AS NEEDED FOR PAIN 1/17/19   Nava Santana MD   ranitidine (ZANTAC) 150 mg tablet Take 300 mg by mouth two (2) times a day. 11/8/19  Provider, Historical         Patient Active Problem List    Diagnosis    Chronic cluster headache, not intractable    Hypertension, uncontrolled    Left lumbar radiculitis    DDD (degenerative disc disease), lumbar    Severe obesity (BMI 35.0-39. 9)         No Known Allergies     Social History     Tobacco Use    Smoking status: Never Smoker    Smokeless tobacco: Never Used   Substance Use Topics    Alcohol use: No       Patient Care Team:  Nava Santana MD as PCP - General (Family Practice)  Nava Santana MD as PCP - [facility] Empaneled Provider  Homar Bonilla DO as Physician (Hand Surgery)    The following sections were reviewed & updated as appropriate: PMH, PSH, FH, and SH. Review of Systems   Constitutional: Positive for chills. Negative for fever. Neurological: Negative for sensory change and focal weakness.            Objective:     Visit Vitals  /68   Pulse 68   Temp 98.5 °F (36.9 °C) (Oral)   Resp 14   Ht 5' 2\" (1.575 m)   Wt 205 lb (93 kg)   SpO2 96%   BMI 37.49 kg/m²      Physical Exam   Constitutional: He is oriented to person, place, and time. He appears well-developed. No distress. Pleasant obese white male   HENT:   Head: Normocephalic and atraumatic. Pulmonary/Chest: Effort normal.   Musculoskeletal:        Right elbow: Normal.       Right wrist: He exhibits normal range of motion and no swelling. Right hand: Normal sensation noted. Normal strength noted. Lymphadenopathy:   Elbow: no lymphadenopathy   Neurological: He is alert and oriented to person, place, and time. Skin:   Ulnar aspect right wrist: 2x4 cm raised red lesion with off center punctum, scant serous drainage. Well demarcated with faint diffuse erythema extending across ventral distal forearm. No fluctuance. Psychiatric: He has a normal mood and affect. His behavior is normal. Judgment and thought content normal.         Disclaimer: The patient understands our medical plan. Alternatives have been explained and offered. The risks, benefits and significant side effects of all medications have been reviewed. Anticipated time course and progression of condition reviewed. All questions have been addressed. He is encouraged to employ the information provided in the after visit summary, which was reviewed. Where applicable, he is instructed to call the clinic if he has not been notified either by phone or through [address] with the results of his tests or with an appointment plan for any referrals within 1 week(s). No news is not good news; it's no news. The patient  is to call if his condition worsens or fails to improve or if significant side effects are experienced. Aspects of this note may have been generated using voice recognition software. Despite editing, there may be unrecognized errors.        Bony Marino MD

## 2019-12-06 ENCOUNTER — TELEPHONE (OUTPATIENT)
Dept: FAMILY MEDICINE CLINIC | Age: 49
End: 2019-12-06

## 2019-12-06 DIAGNOSIS — I10 HYPERTENSION, UNCONTROLLED: ICD-10-CM

## 2019-12-06 NOTE — TELEPHONE ENCOUNTER
Patient wife called in ref a medication that her  is currently taking. amLODIPine-benazepril (LOTREL) 5 mg per capsule. Please call her at [phone] home or [phone] [facility].

## 2019-12-11 RX ORDER — AMLODIPINE AND BENAZEPRIL HYDROCHLORIDE 5; 20 MG/1; MG/1
CAPSULE ORAL
Qty: 90 CAP | Refills: 0 | Status: SHIPPED | OUTPATIENT
Start: 2019-12-11 | End: 2020-03-30

## 2020-01-10 ENCOUNTER — HOSPITAL ENCOUNTER (OUTPATIENT)
Dept: LAB | Age: 50
Discharge: HOME | End: 2020-01-10
Payer: COMMERCIAL

## 2020-01-10 ENCOUNTER — OFFICE VISIT (OUTPATIENT)
Dept: FAMILY MEDICINE CLINIC | Age: 50
End: 2020-01-10

## 2020-01-10 VITALS
TEMPERATURE: 98.3 F | OXYGEN SATURATION: 98 % | BODY MASS INDEX: 37.36 KG/M2 | SYSTOLIC BLOOD PRESSURE: 132 MMHG | HEIGHT: 62 IN | RESPIRATION RATE: 13 BRPM | HEART RATE: 73 BPM | DIASTOLIC BLOOD PRESSURE: 80 MMHG | WEIGHT: 203 LBS

## 2020-01-10 DIAGNOSIS — B99.9 RECURRENT INFECTIONS: ICD-10-CM

## 2020-01-10 DIAGNOSIS — T63.301S SPIDER BITE WOUND, ACCIDENTAL OR UNINTENTIONAL, SEQUELA: Primary | ICD-10-CM

## 2020-01-10 LAB
BASOPHILS # BLD: 0 K/UL (ref 0–0.1)
BASOPHILS NFR BLD: 0 % (ref 0–2)
DIFFERENTIAL METHOD BLD: NORMAL
EOSINOPHIL # BLD: 0.1 K/UL (ref 0–0.4)
EOSINOPHIL NFR BLD: 2 % (ref 0–5)
ERYTHROCYTE [DISTWIDTH] IN BLOOD BY AUTOMATED COUNT: 13.4 % (ref 11.6–14.5)
ERYTHROCYTE [SEDIMENTATION RATE] IN BLOOD: 9 MM/HR (ref 0–20)
HCT VFR BLD AUTO: 41.6 % (ref 36–48)
HGB BLD-MCNC: 13.8 G/DL (ref 13–16)
LYMPHOCYTES # BLD: 1.6 K/UL (ref 0.9–3.6)
LYMPHOCYTES NFR BLD: 28 % (ref 21–52)
MCH RBC QN AUTO: 27.1 PG (ref 24–34)
MCHC RBC AUTO-ENTMCNC: 33.2 G/DL (ref 31–37)
MCV RBC AUTO: 81.6 FL (ref 74–97)
MONOCYTES # BLD: 0.5 K/UL (ref 0.05–1.2)
MONOCYTES NFR BLD: 9 % (ref 3–10)
NEUTS SEG # BLD: 3.5 K/UL (ref 1.8–8)
NEUTS SEG NFR BLD: 61 % (ref 40–73)
PLATELET # BLD AUTO: 229 K/UL (ref 135–420)
PMV BLD AUTO: 11.3 FL (ref 9.2–11.8)
RBC # BLD AUTO: 5.1 M/UL (ref 4.7–5.5)
RHEUMATOID FACT SERPL-ACNC: <10 IU/ML
WBC # BLD AUTO: 5.8 K/UL (ref 4.6–13.2)

## 2020-01-10 PROCEDURE — 85025 COMPLETE CBC W/AUTO DIFF WBC: CPT

## 2020-01-10 PROCEDURE — 86431 RHEUMATOID FACTOR QUANT: CPT

## 2020-01-10 PROCEDURE — 85652 RBC SED RATE AUTOMATED: CPT

## 2020-01-10 NOTE — PATIENT INSTRUCTIONS
Spider Bite or Scorpion Sting: Care Instructions  Your Care Instructions    Spider bites and scorpion stings often cause minor swelling, redness, pain, and itching. These mild symptoms are common and may last from a few hours to a few days. Some people have more severe reactions. Home treatment is often all that you need to relieve symptoms. Follow-up care is a key part of your treatment and safety. Be sure to make and go to all appointments, and call your doctor if you are having problems. It's also a good idea to know your test results and keep a list of the medicines you take. How can you care for yourself at home? · Put ice or a cold pack on the area for 10 to 20 minutes at a time. Put a thin cloth between the ice and your skin. · Try an over-the-counter medicine for itching, redness, swelling, and pain. Read and follow all instructions on the label. ? Take an antihistamine, such as diphenhydramine (Benadryl) or loratadine (Claritin). ? Put a hydrocortisone 1% cream or calamine lotion on the skin. · Don't scratch or rub the skin around the area. When should you call for help? Call 911 anytime you think you may need emergency care. For example, call if:    · You passed out (lost consciousness).     · You have a seizure.     · You have trouble breathing.    Call your doctor now or seek immediate medical care if:    · You have signs of infection, such as:  ? Increased pain, swelling, warmth, or redness around the bite or sting. ? Red streaks leading from the area. ? Pus draining from the area. ? A fever.     · You get a blister or sore at the bite or sting area, or the area turns purple.    Watch closely for changes in your health, and be sure to contact your doctor if:    · You have pain or burning at the area after 2 days of home treatment.     · You have symptoms for more than 1 week. Where can you learn more? Go to http://oni-lay.info/.   Enter P595 in the search box to learn more about \"Spider Bite or Scorpion Sting: Care Instructions. \"  Current as of: June 26, 2019  Content Version: 12.2  © 2003-4221 [employer]. Care instructions adapted under license by [employer] (which disclaims liability or warranty for this information). If you have questions about a medical condition or this instruction, always ask your healthcare professional. [place] [address] any warranty or liability for your use of this information.

## 2020-01-10 NOTE — PROGRESS NOTES
Felipa Means II is a 44 y.o. male  presents for follow up. States that he is feeling better. No Known Allergies  Outpatient Medications Marked as Taking for the 1/10/20 encounter (Office Visit) with Sapna Cartagena MD   Medication Sig Dispense Refill    amLODIPine-benazepril (LOTREL) 5-20 mg per capsule TAKE 1 CAPSULE BY MOUTH EVERY DAY 90 Cap 0    esomeprazole (NEXIUM) 20 mg capsule Take  by mouth daily.  ibuprofen (MOTRIN) 800 mg tablet Take 1 Tab by mouth every eight (8) hours as needed for Pain. 30 Tab 0    acetaminophen (TYLENOL PO) Take  by mouth.  topiramate ER (TROKENDI XR) 25 mg capsule 1 tab po qhs for 1 week then increase to 2 tab po qhs thereafter. 60 Cap 0    gabapentin (NEURONTIN) 100 mg capsule Take 2 Caps by mouth three (3) times daily. TAKE 1 TABLET BY MOUTH 3 TIMES A DAY AS NEEDED FOR PAIN 90 Cap 1    diphenhydrAMINE (BENADRYL ALLERGY) 25 mg tablet Take 25 mg by mouth every six (6) hours as needed for Sleep.  omeprazole (PRILOSEC) 40 mg capsule TAKE ONE CAPSULE BY MOUTH TWICE A DAY  3    cyanocobalamin 1,000 mcg tablet Take 1,000 mcg by mouth daily.  pyridoxine, vitamin B6, (VITAMIN B-6) 100 mg tablet Take 100 mg by mouth daily.  glucosamine/chondr benz A sod (OSTEO BI-FLEX PO) Take  by mouth.  multivitamin (ONE A DAY) tablet Take 1 Tab by mouth daily. Patient Active Problem List   Diagnosis Code    Severe obesity (BMI 35.0-39. 9) E66.01    Left lumbar radiculitis M54.16    DDD (degenerative disc disease), lumbar M51.36    Chronic cluster headache, not intractable G44.029    Hypertension, uncontrolled I10     Past Medical History:   Diagnosis Date    Arthritis     bilat knees    GERD (gastroesophageal reflux disease)     Neck injury about 2007    hit head on pipe, in [place], tx chiro, told he had HNP     Social History     Socioeconomic History    Marital status:      Spouse name: Not on file    Number of children: Not on file    Years of education: Not on file    Highest education level: Not on file   Tobacco Use    Smoking status: Never Smoker    Smokeless tobacco: Never Used   Substance and Sexual Activity    Alcohol use: No    Drug use: No     Family History   Problem Relation Age of Onset    COPD Mother     Neuropathy Mother     No Known Problems Father     No Known Problems Sister     Diabetes Brother     GERD Brother     No Known Problems Brother     No Known Problems Sister         Review of Systems   Constitutional: Negative for chills, fever, malaise/fatigue and weight loss. Eyes: Negative for blurred vision. Respiratory: Negative for shortness of breath and wheezing. Cardiovascular: Negative for chest pain. Gastrointestinal: Negative for nausea and vomiting. Musculoskeletal: Negative for myalgias. Skin: Negative for rash. Neurological: Negative for weakness. Vitals:    01/10/20 1525   BP: 132/80   Pulse: 73   Resp: 13   Temp: 98.3 °F (36.8 °C)   SpO2: 98%   Weight: 203 lb (92.1 kg)   Height: 5' 2\" (1.575 m)       Physical Exam  Vitals signs and nursing note reviewed. Neck:      Musculoskeletal: Normal range of motion and neck supple. Thyroid: No thyromegaly. Cardiovascular:      Rate and Rhythm: Normal rate and regular rhythm. Heart sounds: Normal heart sounds. Pulmonary:      Effort: Pulmonary effort is normal.      Breath sounds: Normal breath sounds. Musculoskeletal: Normal range of motion. Skin:     General: Skin is warm and dry. Neurological:      Mental Status: He is alert and oriented to person, place, and time. Psychiatric:         Mood and Affect: Mood normal.         Behavior: Behavior normal.         Thought Content: Thought content normal.         Judgment: Judgment normal.         Assessment/Plan      ICD-10-CM ICD-9-CM    1.  Spider bite wound, accidental or unintentional, sequela T63.301S 909.1 Healed      E929.5      I have discussed the diagnosis with the patient and the intended plan of care as seen in the above orders. The patient has received an after-visit summary and questions were answered concerning future plans. I have discussed medication, side effects, and warnings with the patient in detail. The patient verbalized understanding and is in agreement with the plan of care. The patient will contact the office with any additional concerns.     lab results and schedule of future lab studies reviewed with patient    Ajay Stewart MD

## 2020-01-10 NOTE — PROGRESS NOTES
Chief Complaint   Patient presents with    Cyst     Pt has boil or possible bug bite on left elbow. Pt was admitted into [facility] x 4 days for spider bite. He was given IV vanc and zosyn. He was told that his immune system.

## 2020-03-25 ENCOUNTER — TELEPHONE (OUTPATIENT)
Dept: FAMILY MEDICINE CLINIC | Age: 50
End: 2020-03-25
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
## 2020-03-25 RX ORDER — DOXYCYCLINE 100 MG/1
100 CAPSULE ORAL 2 TIMES DAILY
Qty: 20 CAP | Refills: 0 | Status: SHIPPED | OUTPATIENT
Start: 2020-03-25 | End: 2020-04-04

## 2020-03-25 NOTE — TELEPHONE ENCOUNTER
Patients wife called the office stating patient had a tick on him for about 2-3 days they did pull this off and thinks they got all of it. She says the area is now red, raised and warm to the touch she was placed on hold while Dr. Blaise De La Cruz was consulted who will send in antibiotic for patient, Ms. Lettie Essex has been notified and told to call the office back if this does not improve.

## 2020-03-27 ENCOUNTER — TELEPHONE (OUTPATIENT)
Dept: FAMILY MEDICINE CLINIC | Age: 50
End: 2020-03-27
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
## 2020-03-27 RX ORDER — CEFUROXIME AXETIL 500 MG/1
500 TABLET ORAL 2 TIMES DAILY
Qty: 28 TAB | Refills: 0 | Status: SHIPPED | OUTPATIENT
Start: 2020-03-27 | End: 2020-04-10

## 2020-03-27 NOTE — TELEPHONE ENCOUNTER
Jeannene Prader, MD  You 7 minutes ago (11:49 AM)      Ceftin 500 has been sent in tell him to take both antibiotics. Dorinda Peters Mrs. Lynne Strickland had her verify patient  she has been told Dr. Christelle Hernandez has sent in another antibiotic Ceftin 500 mg that he wants patient to add this to the doxycycline he is already taking she has expressed understanding.

## 2020-03-27 NOTE — TELEPHONE ENCOUNTER
Patient wife called stating that the anitbiotic doxycycline (VIBRAMYCIN) 100 mg capsule. That was prescribed for  tick bite is not working. Please review advise and call patient wife at [phone].

## 2020-03-29 DIAGNOSIS — I10 HYPERTENSION, UNCONTROLLED: ICD-10-CM

## 2020-03-30 RX ORDER — AMLODIPINE AND BENAZEPRIL HYDROCHLORIDE 5; 20 MG/1; MG/1
CAPSULE ORAL
Qty: 90 CAP | Refills: 0 | Status: SHIPPED | OUTPATIENT
Start: 2020-03-30 | End: 2020-06-23

## 2020-05-05 ENCOUNTER — VIRTUAL VISIT (OUTPATIENT)
Dept: FAMILY MEDICINE CLINIC | Age: 50
End: 2020-05-05

## 2020-05-05 DIAGNOSIS — L02.92 BOIL: Primary | ICD-10-CM

## 2020-05-05 RX ORDER — AMOXICILLIN AND CLAVULANATE POTASSIUM 875; 125 MG/1; MG/1
1 TABLET, FILM COATED ORAL 2 TIMES DAILY
Qty: 28 TAB | Refills: 0 | Status: SHIPPED | OUTPATIENT
Start: 2020-05-05 | End: 2020-05-19

## 2020-05-05 NOTE — PROGRESS NOTES
Alejandrina Banks II is a 44 y.o. male who was seen by synchronous (real-time) audio-video technology on 5/5/2020. Consent: Alejandrina Banks II, who was seen by synchronous (real-time) audio-video technology, and/or his healthcare decision maker, is aware that this patient-initiated, Telehealth encounter on 5/5/2020 is a billable service, with coverage as determined by his insurance carrier. He is aware that he may receive a bill and has provided verbal consent to proceed: Yes. Assessment & Plan:   Diagnoses and all orders for this visit:    1. Boil  -     amoxicillin-clavulanate (AUGMENTIN) 875-125 mg per tablet; Take 1 Tab by mouth two (2) times a day for 14 days. 712  Subjective:   Alejandrina Banks II is a 44 y.o. male who was seen for Skin Problem (he has lesion on groin on right side. it has some assoc draining. no fever or chills. )      Prior to Admission medications    Medication Sig Start Date End Date Taking? Authorizing Provider   amLODIPine-benazepril (LOTREL) 5-20 mg per capsule TAKE 1 CAPSULE BY MOUTH EVERY DAY 3/30/20   Marti Mccabe MD   esomeprazole (NEXIUM) 20 mg capsule Take  by mouth daily. Provider, Historical   ibuprofen (MOTRIN) 800 mg tablet Take 1 Tab by mouth every eight (8) hours as needed for Pain. 11/8/19   Edmund Prieto MD   acetaminophen (TYLENOL PO) Take  by mouth. Provider, Historical   topiramate ER (TROKENDI XR) 25 mg capsule 1 tab po qhs for 1 week then increase to 2 tab po qhs thereafter. 1/24/19   Kaylan Bae MD   gabapentin (NEURONTIN) 100 mg capsule Take 2 Caps by mouth three (3) times daily. TAKE 1 TABLET BY MOUTH 3 TIMES A DAY AS NEEDED FOR PAIN 1/17/19   Marti Mccabe MD   diphenhydrAMINE (BENADRYL ALLERGY) 25 mg tablet Take 25 mg by mouth every six (6) hours as needed for Sleep.     Provider, Historical   omeprazole (PRILOSEC) 40 mg capsule TAKE ONE CAPSULE BY MOUTH TWICE A DAY 8/11/18   Provider, Historical   cyanocobalamin 1,000 mcg tablet Take 1,000 mcg by mouth daily. Provider, Historical   pyridoxine, vitamin B6, (VITAMIN B-6) 100 mg tablet Take 100 mg by mouth daily. Provider, Historical   glucosamine/chondr benz A sod (OSTEO BI-FLEX PO) Take  by mouth. Provider, Historical   multivitamin (ONE A DAY) tablet Take 1 Tab by mouth daily. Provider, Historical     No Known Allergies    Patient Active Problem List   Diagnosis Code    Severe obesity (BMI 35.0-39. 9) E66.01    Left lumbar radiculitis M54.16    DDD (degenerative disc disease), lumbar M51.36    Chronic cluster headache, not intractable G44.029    Hypertension, uncontrolled I10     Past Medical History:   Diagnosis Date    Arthritis     bilat knees    GERD (gastroesophageal reflux disease)     Neck injury about 2007    hit head on pipe, in [place], tx chiro, told he had HNP       Review of Systems   Constitutional: Negative for fever. Gastrointestinal: Negative for nausea and vomiting. Skin: Negative for itching and rash. Draining of lesion in groin and on buttocks on right side. Objective: There were no vitals taken for this visit. General: alert, cooperative, no distress   Mental  status: normal mood, behavior, speech, dress, motor activity, and thought processes, able to follow commands   HENT: NCAT   Neck: no visualized mass   Resp: no respiratory distress   Neuro: no gross deficits   Skin: no discoloration or lesions of concern on visible areas   Psychiatric: normal affect, consistent with stated mood, no evidence of hallucinations     Additional exam findings: We discussed the expected course, resolution and complications of the diagnosis(es) in detail. Medication risks, benefits, costs, interactions, and alternatives were discussed as indicated. I advised him to contact the office if his condition worsens, changes or fails to improve as anticipated. He expressed understanding with the diagnosis(es) and plan.      Shaylee Terry Katherin Mora is a 44 y.o. male who was evaluated by a video visit encounter for concerns as above. Patient identification was verified prior to start of the visit. A caregiver was present when appropriate. Due to this being a TeleHealth encounter (During YIT[facility]-09 public health emergency), evaluation of the following organ systems was limited: Vitals/Constitutional/EENT/Resp/CV/GI//MS/Neuro/Skin/Heme-Lymph-Imm. Pursuant to the emergency declaration under the [facility], 1135 waiver authority and the [employer] Act, this Virtual  Visit was conducted, with patient's (and/or legal guardian's) consent, to reduce the patient's risk of exposure to COVID-19 and provide necessary medical care. Services were provided through a video synchronous discussion virtually via doxy. me to substitute for in-person clinic visit. Patient and provider were located at their individual homes.       Sue Machado MD

## 2020-06-11 ENCOUNTER — VIRTUAL VISIT (OUTPATIENT)
Dept: FAMILY MEDICINE CLINIC | Age: 50
End: 2020-06-11

## 2020-06-11 DIAGNOSIS — L03.116 CELLULITIS OF KNEE, LEFT: Primary | ICD-10-CM

## 2020-06-11 RX ORDER — SULFAMETHOXAZOLE AND TRIMETHOPRIM 800; 160 MG/1; MG/1
1 TABLET ORAL 2 TIMES DAILY
Qty: 28 TAB | Refills: 0 | Status: SHIPPED | OUTPATIENT
Start: 2020-06-11 | End: 2020-06-23 | Stop reason: ALTCHOICE

## 2020-06-11 NOTE — PROGRESS NOTES
Katie Fragoso II is a 44 y.o. male who was seen by synchronous (real-time) audio-video technology on 6/11/2020. Consent: Katie Fragoso II, who was seen by synchronous (real-time) audio-video technology, and/or his healthcare decision maker, is aware that this patient-initiated, Telehealth encounter on 6/11/2020 is a billable service, with coverage as determined by his insurance carrier. He is aware that he may receive a bill and has provided verbal consent to proceed: Yes. Assessment & Plan:   Diagnoses and all orders for this visit:    1. Cellulitis of knee, left  -     trimethoprim-sulfamethoxazole (BACTRIM DS, SEPTRA DS) 160-800 mg per tablet; Take 1 Tab by mouth two (2) times a day for 14 days. I spent at least 15 minutes on this visit with this established patient. 712  Subjective:   Katie Fragoso II is a 44 y.o. male who was seen for No chief complaint on file. Prior to Admission medications    Medication Sig Start Date End Date Taking? Authorizing Provider   amLODIPine-benazepril (LOTREL) 5-20 mg per capsule TAKE 1 CAPSULE BY MOUTH EVERY DAY 3/30/20   Dwight Márquez MD   esomeprazole (NEXIUM) 20 mg capsule Take  by mouth daily. Provider, Historical   ibuprofen (MOTRIN) 800 mg tablet Take 1 Tab by mouth every eight (8) hours as needed for Pain. 11/8/19   Danyelle Christianson MD   acetaminophen (TYLENOL PO) Take  by mouth. Provider, Historical   topiramate ER (TROKENDI XR) 25 mg capsule 1 tab po qhs for 1 week then increase to 2 tab po qhs thereafter. 1/24/19   Ludivina Bynum MD   gabapentin (NEURONTIN) 100 mg capsule Take 2 Caps by mouth three (3) times daily. TAKE 1 TABLET BY MOUTH 3 TIMES A DAY AS NEEDED FOR PAIN 1/17/19   Dwight Márquez MD   diphenhydrAMINE (BENADRYL ALLERGY) 25 mg tablet Take 25 mg by mouth every six (6) hours as needed for Sleep.     Provider, Historical   omeprazole (PRILOSEC) 40 mg capsule TAKE ONE CAPSULE BY MOUTH TWICE A DAY 8/11/18 Provider, Historical   cyanocobalamin 1,000 mcg tablet Take 1,000 mcg by mouth daily. Provider, Historical   pyridoxine, vitamin B6, (VITAMIN B-6) 100 mg tablet Take 100 mg by mouth daily. Provider, Historical   glucosamine/chondr benz A sod (OSTEO BI-FLEX PO) Take  by mouth. Provider, Historical   multivitamin (ONE A DAY) tablet Take 1 Tab by mouth daily. Provider, Historical     No Known Allergies    Patient Active Problem List   Diagnosis Code    Severe obesity (BMI 35.0-39. 9) E66.01    Left lumbar radiculitis M54.16    DDD (degenerative disc disease), lumbar M51.36    Chronic cluster headache, not intractable G44.029    Hypertension, uncontrolled I10     Past Medical History:   Diagnosis Date    Arthritis     bilat knees    GERD (gastroesophageal reflux disease)     Neck injury about 2007    hit head on pipe, in [place], tx chiro, told he had HNP       Review of Systems   Constitutional: Negative for chills and fever. Respiratory: Negative for cough. Cardiovascular: Negative for chest pain and palpitations. Musculoskeletal: Negative. Skin: Negative for itching and rash. Objective: There were no vitals taken for this visit. General: alert, cooperative, no distress   Mental  status: normal mood, behavior, speech, dress, motor activity, and thought processes, able to follow commands   HENT: NCAT   Neck: no visualized mass   Resp: no respiratory distress   Neuro: no gross deficits   Skin: no discoloration or lesions of concern on visible areas   Psychiatric: normal affect, consistent with stated mood, no evidence of hallucinations     Additional exam findings: We discussed the expected course, resolution and complications of the diagnosis(es) in detail. Medication risks, benefits, costs, interactions, and alternatives were discussed as indicated. I advised him to contact the office if his condition worsens, changes or fails to improve as anticipated.  He expressed understanding with the diagnosis(es) and plan. Priyanka Kidd II is a 44 y.o. male who was evaluated by a video visit encounter for concerns as above. Patient identification was verified prior to start of the visit. A caregiver was present when appropriate. Due to this being a TeleHealth encounter ([facility]), evaluation of the following organ systems was limited: Vitals/Constitutional/EENT/Resp/CV/GI//MS/Neuro/Skin/Heme-Lymph-Imm. Pursuant to the emergency declaration under the [facility], [facility] waiver authority and the [employer] Act, this Virtual  Visit was conducted, with patient's (and/or legal guardian's) consent, to reduce the patient's risk of exposure to COVID-19 and provide necessary medical care. Services were provided through a video synchronous discussion virtually via doxy. me to substitute for in-person clinic visit. Patient and provider were located at their individual homes.       Lalito Benitez MD

## 2020-06-23 DIAGNOSIS — I10 HYPERTENSION, UNCONTROLLED: ICD-10-CM

## 2020-06-23 RX ORDER — AMLODIPINE AND BENAZEPRIL HYDROCHLORIDE 5; 20 MG/1; MG/1
CAPSULE ORAL
Qty: 90 CAP | Refills: 0 | Status: SHIPPED | OUTPATIENT
Start: 2020-06-23 | End: 2020-09-17

## 2020-07-14 ENCOUNTER — VIRTUAL VISIT (OUTPATIENT)
Dept: FAMILY MEDICINE CLINIC | Age: 50
End: 2020-07-14

## 2020-07-14 DIAGNOSIS — L02.92 BOIL: Primary | ICD-10-CM

## 2020-07-14 RX ORDER — SULFAMETHOXAZOLE AND TRIMETHOPRIM 800; 160 MG/1; MG/1
1 TABLET ORAL 2 TIMES DAILY
Qty: 20 TAB | Refills: 1 | Status: SHIPPED | OUTPATIENT
Start: 2020-07-14 | End: 2020-07-24

## 2020-07-14 NOTE — PROGRESS NOTES
Afia Wilson II is a 44 y.o. male who was seen by synchronous (real-time) audio-video technology on 7/14/2020 for No chief complaint on file. Assessment & Plan:   Diagnoses and all orders for this visit:    1. Boil  -     trimethoprim-sulfamethoxazole (BACTRIM DS, SEPTRA DS) 160-800 mg per tablet; Take 1 Tab by mouth two (2) times a day for 10 days. I have discussed the diagnosis with the patient and the intended plan of care as seen in the above orders. The patient has received an after-visit summary and questions were answered concerning future plans. I have discussed medication, side effects, and warnings with the patient in detail. The patient verbalized understanding and is in agreement with the plan of care. The patient will contact the office with any additional concerns. 712  Subjective:       Prior to Admission medications    Medication Sig Start Date End Date Taking? Authorizing Provider   amLODIPine-benazepril (LOTREL) 5-20 mg per capsule TAKE 1 CAPSULE BY MOUTH EVERY DAY 6/23/20   Anya Ramesh MD   esomeprazole (NEXIUM) 20 mg capsule Take  by mouth daily. Provider, Historical   ibuprofen (MOTRIN) 800 mg tablet Take 1 Tab by mouth every eight (8) hours as needed for Pain. 11/8/19   Michael Jaime MD   acetaminophen (TYLENOL PO) Take  by mouth. Provider, Historical   topiramate ER (TROKENDI XR) 25 mg capsule 1 tab po qhs for 1 week then increase to 2 tab po qhs thereafter. 1/24/19   Lazaro Noble MD   gabapentin (NEURONTIN) 100 mg capsule Take 2 Caps by mouth three (3) times daily. TAKE 1 TABLET BY MOUTH 3 TIMES A DAY AS NEEDED FOR PAIN 1/17/19   Anya Ramesh MD   diphenhydrAMINE (BENADRYL ALLERGY) 25 mg tablet Take 25 mg by mouth every six (6) hours as needed for Sleep. Provider, Historical   omeprazole (PRILOSEC) 40 mg capsule TAKE ONE CAPSULE BY MOUTH TWICE A DAY 8/11/18   Provider, Historical   cyanocobalamin 1,000 mcg tablet Take 1,000 mcg by mouth daily. Provider, Historical   pyridoxine, vitamin B6, (VITAMIN B-6) 100 mg tablet Take 100 mg by mouth daily. Provider, Historical   glucosamine/chondr benz A sod (OSTEO BI-FLEX PO) Take  by mouth. Provider, Historical   multivitamin (ONE A DAY) tablet Take 1 Tab by mouth daily. Provider, Historical     Patient Active Problem List   Diagnosis Code    Severe obesity (BMI 35.0-39. 9) E66.01    Left lumbar radiculitis M54.16    DDD (degenerative disc disease), lumbar M51.36    Chronic cluster headache, not intractable G44.029    Hypertension, uncontrolled I10     Past Medical History:   Diagnosis Date    Arthritis     bilat knees    GERD (gastroesophageal reflux disease)     Neck injury about 2007    hit head on pipe, in [place], tx chiro, told he had HNP       Review of Systems   Constitutional: Negative for chills and fever. Respiratory: Negative for cough. Cardiovascular: Negative for chest pain and palpitations. Gastrointestinal: Negative for nausea and vomiting. Skin:        Boil on waist at belt line. Neurological: Negative. Psychiatric/Behavioral: Negative. Objective:   No flowsheet data found. General: alert, cooperative, no distress   Mental  status: normal mood, behavior, speech, dress, motor activity, and thought processes, able to follow commands   HENT: NCAT   Neck: no visualized mass   Resp: no respiratory distress   Neuro: no gross deficits   Skin: no discoloration or lesions of concern on visible areas   Psychiatric: normal affect, consistent with stated mood, no evidence of hallucinations     Additional exam findings: We discussed the expected course, resolution and complications of the diagnosis(es) in detail. Medication risks, benefits, costs, interactions, and alternatives were discussed as indicated. I advised him to contact the office if his condition worsens, changes or fails to improve as anticipated. He expressed understanding with the diagnosis(es) and plan. Darren Zee Lima 877 II, who was evaluated through a patient-initiated, synchronous (real-time) audio-video encounter, and/or his healthcare decision maker, is aware that it is a billable service, with coverage as determined by his insurance carrier. He provided verbal consent to proceed: Yes, and patient identification was verified. It was conducted pursuant to the emergency declaration under the [address] [place] [address] and the [employer] Act. A caregiver was present when appropriate. Ability to conduct physical exam was limited. I was in the office. The patient was at home.       Nicky Mclean MD

## 2020-09-17 DIAGNOSIS — I10 HYPERTENSION, UNCONTROLLED: ICD-10-CM

## 2020-09-17 RX ORDER — AMLODIPINE AND BENAZEPRIL HYDROCHLORIDE 5; 20 MG/1; MG/1
CAPSULE ORAL
Qty: 90 CAP | Refills: 0 | Status: SHIPPED | OUTPATIENT
Start: 2020-09-17 | End: 2020-12-15

## 2020-09-24 ENCOUNTER — VIRTUAL VISIT (OUTPATIENT)
Dept: FAMILY MEDICINE CLINIC | Age: 50
End: 2020-09-24
Payer: COMMERCIAL

## 2020-09-24 DIAGNOSIS — L02.92 BOIL: Primary | ICD-10-CM

## 2020-09-24 PROCEDURE — 99214 OFFICE O/P EST MOD 30 MIN: CPT | Performed by: FAMILY MEDICINE

## 2020-09-24 RX ORDER — SULFAMETHOXAZOLE AND TRIMETHOPRIM 800; 160 MG/1; MG/1
1 TABLET ORAL 2 TIMES DAILY
Qty: 20 TAB | Refills: 2 | Status: SHIPPED | OUTPATIENT
Start: 2020-09-24 | End: 2021-01-04

## 2020-09-24 NOTE — PROGRESS NOTES
Yojana Borjas II is a 44 y.o. male who was seen by synchronous (real-time) audio-video technology on 9/24/2020 for Skin Problem (recurrent)        Assessment & Plan:   Diagnoses and all orders for this visit:    1. Boil  -     trimethoprim-sulfamethoxazole (BACTRIM DS, SEPTRA DS) 160-800 mg per tablet; Take 1 Tab by mouth two (2) times a day for 10 days. 712  Subjective:       Prior to Admission medications    Medication Sig Start Date End Date Taking? Authorizing Provider   amLODIPine-benazepril (LOTREL) 5-20 mg per capsule TAKE 1 CAPSULE BY MOUTH EVERY DAY 9/17/20   Emigdio Ny MD   esomeprazole (NEXIUM) 20 mg capsule Take  by mouth daily. Provider, Historical   ibuprofen (MOTRIN) 800 mg tablet Take 1 Tab by mouth every eight (8) hours as needed for Pain. 11/8/19   Ary Ordoñez MD   acetaminophen (TYLENOL PO) Take  by mouth. Provider, Historical   topiramate ER (TROKENDI XR) 25 mg capsule 1 tab po qhs for 1 week then increase to 2 tab po qhs thereafter. 1/24/19   Angel Garrison MD   gabapentin (NEURONTIN) 100 mg capsule Take 2 Caps by mouth three (3) times daily. TAKE 1 TABLET BY MOUTH 3 TIMES A DAY AS NEEDED FOR PAIN 1/17/19   Emigdio Ny MD   diphenhydrAMINE (BENADRYL ALLERGY) 25 mg tablet Take 25 mg by mouth every six (6) hours as needed for Sleep. Provider, Historical   omeprazole (PRILOSEC) 40 mg capsule TAKE ONE CAPSULE BY MOUTH TWICE A DAY 8/11/18   Provider, Historical   cyanocobalamin 1,000 mcg tablet Take 1,000 mcg by mouth daily. Provider, Historical   pyridoxine, vitamin B6, (VITAMIN B-6) 100 mg tablet Take 100 mg by mouth daily. Provider, Historical   glucosamine/chondr benz A sod (OSTEO BI-FLEX PO) Take  by mouth. Provider, Historical   multivitamin (ONE A DAY) tablet Take 1 Tab by mouth daily. Provider, Historical     Patient Active Problem List   Diagnosis Code    Severe obesity (BMI 35.0-39. 9) E66.01    Left lumbar radiculitis M54.16    DDD (degenerative disc disease), lumbar M51.36    Chronic cluster headache, not intractable G44.029    Hypertension, uncontrolled I10     Past Medical History:   Diagnosis Date    Arthritis     bilat knees    GERD (gastroesophageal reflux disease)     Neck injury about 2007    hit head on pipe, in [place], tx chiro, told he had HNP       Review of Systems   Constitutional: Negative for chills and fever. Respiratory: Negative for cough and shortness of breath. Cardiovascular: Negative for chest pain and palpitations. Skin: Negative for itching and rash. Boil on back of leg. Neurological: Negative. Psychiatric/Behavioral: Negative. Objective:   No flowsheet data found. General: alert, cooperative, no distress   Mental  status: normal mood, behavior, speech, dress, motor activity, and thought processes, able to follow commands   HENT: NCAT   Neck: no visualized mass   Resp: no respiratory distress   Neuro: no gross deficits   Skin: no discoloration or lesions of concern on visible areas   Psychiatric: normal affect, consistent with stated mood, no evidence of hallucinations     Additional exam findings: We discussed the expected course, resolution and complications of the diagnosis(es) in detail. Medication risks, benefits, costs, interactions, and alternatives were discussed as indicated. I advised him to contact the office if his condition worsens, changes or fails to improve as anticipated. He expressed understanding with the diagnosis(es) and plan. Darren Zee Lima 877 II, who was evaluated through a patient-initiated, synchronous (real-time) audio-video encounter, and/or his healthcare decision maker, is aware that it is a billable service, with coverage as determined by his insurance carrier. He provided verbal consent to proceed: Yes, and patient identification was verified.  It was conducted pursuant to the emergency declaration under the [address] Emergencies Act, [facility] authority and the Coronavirus Preparedness and Response Supplemental Appropriations Act. A caregiver was present when appropriate. Ability to conduct physical exam was limited. I was at home. The patient was at home.       Marika Miranda MD

## 2020-12-15 DIAGNOSIS — I10 HYPERTENSION, UNCONTROLLED: ICD-10-CM

## 2020-12-15 RX ORDER — AMLODIPINE AND BENAZEPRIL HYDROCHLORIDE 5; 20 MG/1; MG/1
CAPSULE ORAL
Qty: 90 CAP | Refills: 0 | Status: SHIPPED | OUTPATIENT
Start: 2020-12-15 | End: 2021-03-08

## 2021-01-02 DIAGNOSIS — L02.92 BOIL: ICD-10-CM

## 2021-01-04 RX ORDER — SULFAMETHOXAZOLE AND TRIMETHOPRIM 800; 160 MG/1; MG/1
TABLET ORAL
Qty: 20 TAB | Refills: 2 | Status: SHIPPED | OUTPATIENT
Start: 2021-01-04 | End: 2021-03-08 | Stop reason: ALTCHOICE

## 2021-01-20 ENCOUNTER — VIRTUAL VISIT (OUTPATIENT)
Dept: FAMILY MEDICINE CLINIC | Age: 51
End: 2021-01-20
Payer: COMMERCIAL

## 2021-01-20 DIAGNOSIS — Z13.220 LIPID SCREENING: ICD-10-CM

## 2021-01-20 DIAGNOSIS — I10 HYPERTENSION, UNCONTROLLED: Primary | ICD-10-CM

## 2021-01-20 DIAGNOSIS — Z12.5 PROSTATE CANCER SCREENING: ICD-10-CM

## 2021-01-20 PROCEDURE — 99213 OFFICE O/P EST LOW 20 MIN: CPT | Performed by: FAMILY MEDICINE

## 2021-01-20 NOTE — PROGRESS NOTES
Cece Edwards II is a 44 y.o. male who was seen by synchronous (real-time) audio-video technology on 1/20/2021 for No chief complaint on file. Assessment & Plan:   Diagnoses and all orders for this visit:    1. Hypertension, uncontrolled  -     METABOLIC PANEL, COMPREHENSIVE; Future  -     CBC WITH AUTOMATED DIFF; Future    2. Prostate cancer screening  -     PSA SCREENING (SCREENING); Future    3. Lipid screening  -     LIPID PANEL; Future          712  Subjective:       Prior to Admission medications    Medication Sig Start Date End Date Taking? Authorizing Provider   trimethoprim-sulfamethoxazole (BACTRIM DS, SEPTRA DS) 160-800 mg per tablet TAKE 1 TABLET BY MOUTH TWICE A DAY FOR 10 DAYS 1/4/21   Rosalba Zamora MD   amLODIPine-benazepril (LOTREL) 5-20 mg per capsule TAKE 1 CAPSULE BY MOUTH EVERY DAY 12/15/20   Rosalba Zamora MD   esomeprazole (NEXIUM) 20 mg capsule Take  by mouth daily. Provider, Historical   ibuprofen (MOTRIN) 800 mg tablet Take 1 Tab by mouth every eight (8) hours as needed for Pain. 11/8/19   Christopher Choudhury MD   acetaminophen (TYLENOL PO) Take  by mouth. Provider, Historical   topiramate ER (TROKENDI XR) 25 mg capsule 1 tab po qhs for 1 week then increase to 2 tab po qhs thereafter. 1/24/19   Taurus Medrano MD   gabapentin (NEURONTIN) 100 mg capsule Take 2 Caps by mouth three (3) times daily. TAKE 1 TABLET BY MOUTH 3 TIMES A DAY AS NEEDED FOR PAIN 1/17/19   Rosalba Zamora MD   diphenhydrAMINE (BENADRYL ALLERGY) 25 mg tablet Take 25 mg by mouth every six (6) hours as needed for Sleep. Provider, Historical   omeprazole (PRILOSEC) 40 mg capsule TAKE ONE CAPSULE BY MOUTH TWICE A DAY 8/11/18   Provider, Historical   cyanocobalamin 1,000 mcg tablet Take 1,000 mcg by mouth daily. Provider, Historical   pyridoxine, vitamin B6, (VITAMIN B-6) 100 mg tablet Take 100 mg by mouth daily. Provider, Historical   glucosamine/chondr benz A sod (OSTEO BI-FLEX PO) Take  by mouth. Provider, Historical   multivitamin (ONE A DAY) tablet Take 1 Tab by mouth daily. Provider, Historical     Patient Active Problem List   Diagnosis Code    Severe obesity (BMI 35.0-39. 9) E66.01    Left lumbar radiculitis M54.16    DDD (degenerative disc disease), lumbar M51.36    Chronic cluster headache, not intractable G44.029    Hypertension, uncontrolled I10     Past Medical History:   Diagnosis Date    Arthritis     bilat knees    GERD (gastroesophageal reflux disease)     Neck injury about 2007    hit head on pipe, in [place], tx chiro, told he had HNP       Review of Systems   Constitutional: Negative for chills and fever. Respiratory: Negative for cough and shortness of breath. Cardiovascular: Negative for chest pain and palpitations. Gastrointestinal: Negative for nausea and vomiting. Neurological: Negative. Psychiatric/Behavioral: Negative. Objective:   No flowsheet data found. General: alert, cooperative, no distress   Mental  status: normal mood, behavior, speech, dress, motor activity, and thought processes, able to follow commands   HENT: NCAT   Neck: no visualized mass   Resp: no respiratory distress   Neuro: no gross deficits   Skin: no discoloration or lesions of concern on visible areas   Psychiatric: normal affect, consistent with stated mood, no evidence of hallucinations     Additional exam findings: We discussed the expected course, resolution and complications of the diagnosis(es) in detail. Medication risks, benefits, costs, interactions, and alternatives were discussed as indicated. I advised him to contact the office if his condition worsens, changes or fails to improve as anticipated. He expressed understanding with the diagnosis(es) and plan.        [facility], who was evaluated through a patient-initiated, synchronous (real-time) audio-video encounter, and/or his healthcare decision maker, is aware that it is a billable service, with coverage as determined by his insurance carrier. He provided verbal consent to proceed: Yes, and patient identification was verified. It was conducted pursuant to the emergency declaration under the [address] [place] [address] and the [employer] Act. A caregiver was present when appropriate. Ability to conduct physical exam was limited. I was in the office. The patient was at home.       Ankur Grove MD

## 2021-01-20 NOTE — PROGRESS NOTES
Pt has family hx cancer and has lost several family members from it. He would like to discuss if there is any testing that can be done to make sure he is not predisposed to it.

## 2021-01-21 ENCOUNTER — HOSPITAL ENCOUNTER (OUTPATIENT)
Dept: LAB | Age: 51
Discharge: HOME | End: 2021-01-21

## 2021-01-21 LAB — SENTARA SPECIMEN COL,SENBCF: NORMAL

## 2021-01-21 PROCEDURE — 99001 SPECIMEN HANDLING PT-LAB: CPT

## 2021-01-22 LAB
A-G RATIO,AGRAT: 2 RATIO (ref 1.1–2.6)
ABSOLUTE LYMPHOCYTE COUNT, 10803: 1.7 K/UL (ref 1–4.8)
ALBUMIN SERPL-MCNC: 4.5 G/DL (ref 3.5–5)
ALP SERPL-CCNC: 94 U/L (ref 25–115)
ALT SERPL-CCNC: 36 U/L (ref 5–40)
ANION GAP SERPL CALC-SCNC: 12 MMOL/L (ref 3–15)
AST SERPL W P-5'-P-CCNC: 22 U/L (ref 10–37)
BASOPHILS # BLD: 0 K/UL (ref 0–0.2)
BASOPHILS NFR BLD: 1 % (ref 0–2)
BILIRUB SERPL-MCNC: 0.6 MG/DL (ref 0.2–1.2)
BUN SERPL-MCNC: 23 MG/DL (ref 6–22)
CALCIUM SERPL-MCNC: 9.7 MG/DL (ref 8.4–10.5)
CHLORIDE SERPL-SCNC: 106 MMOL/L (ref 98–110)
CHOLEST SERPL-MCNC: 186 MG/DL (ref 110–200)
CO2 SERPL-SCNC: 24 MMOL/L (ref 20–32)
CREAT SERPL-MCNC: 0.9 MG/DL (ref 0.5–1.2)
EOSINOPHIL # BLD: 0.1 K/UL (ref 0–0.5)
EOSINOPHIL NFR BLD: 2 % (ref 0–6)
ERYTHROCYTE [DISTWIDTH] IN BLOOD BY AUTOMATED COUNT: 13 % (ref 10–15.5)
GFRAA, 66117: >60
GFRNA, 66118: >60
GLOBULIN,GLOB: 2.2 G/DL (ref 2–4)
GLUCOSE SERPL-MCNC: 110 MG/DL (ref 70–99)
GRANULOCYTES,GRANS: 62 % (ref 40–75)
HCT VFR BLD AUTO: 43.7 % (ref 39.3–51.6)
HDLC SERPL-MCNC: 37 MG/DL
HDLC SERPL-MCNC: 5 MG/DL (ref 0–5)
HGB BLD-MCNC: 14 G/DL (ref 13.1–17.2)
LDL/HDL RATIO,LDHD: 3.4
LDLC SERPL CALC-MCNC: 125 MG/DL (ref 50–99)
LYMPHOCYTES, LYMLT: 28 % (ref 20–45)
MCH RBC QN AUTO: 27 PG (ref 26–34)
MCHC RBC AUTO-ENTMCNC: 32 G/DL (ref 31–36)
MCV RBC AUTO: 85 FL (ref 80–95)
MONOCYTES # BLD: 0.5 K/UL (ref 0.1–1)
MONOCYTES NFR BLD: 8 % (ref 3–12)
NEUTROPHILS # BLD AUTO: 3.9 K/UL (ref 1.8–7.7)
NON-HDL CHOLESTEROL, 011976: 149 MG/DL
PLATELET # BLD AUTO: 214 K/UL (ref 140–440)
PMV BLD AUTO: 11.5 FL (ref 9–13)
POTASSIUM SERPL-SCNC: 4.4 MMOL/L (ref 3.5–5.5)
PROT SERPL-MCNC: 6.7 G/DL (ref 6.4–8.3)
PSA SERPL-MCNC: 1.4 NG/ML
RBC # BLD AUTO: 5.13 M/UL (ref 3.8–5.8)
SODIUM SERPL-SCNC: 142 MMOL/L (ref 133–145)
TRIGL SERPL-MCNC: 122 MG/DL (ref 40–149)
VLDLC SERPL CALC-MCNC: 24 MG/DL (ref 8–30)
WBC # BLD AUTO: 6.3 K/UL (ref 4–11)

## 2021-03-08 ENCOUNTER — TELEPHONE (OUTPATIENT)
Dept: FAMILY MEDICINE CLINIC | Age: 51
End: 2021-03-08

## 2021-03-08 DIAGNOSIS — I10 HYPERTENSION, UNCONTROLLED: ICD-10-CM

## 2021-03-08 RX ORDER — AMLODIPINE AND BENAZEPRIL HYDROCHLORIDE 5; 20 MG/1; MG/1
CAPSULE ORAL
Qty: 90 CAP | Refills: 0 | Status: SHIPPED | OUTPATIENT
Start: 2021-03-08 | End: 2021-06-14

## 2021-03-08 NOTE — TELEPHONE ENCOUNTER
Patient's wife called and has questions about his recent labs. She states he is on bp meds but does get some headaches at times but they have found that is when he forgets to take his blood pressure meds. They saw the cholesterol results and have more detailed questions surrounding the values out of range and are wondering if he too may need to be on cholesterol meds. She can be reached at [phone].

## 2021-06-12 DIAGNOSIS — I10 HYPERTENSION, UNCONTROLLED: ICD-10-CM

## 2021-06-14 RX ORDER — AMLODIPINE AND BENAZEPRIL HYDROCHLORIDE 5; 20 MG/1; MG/1
CAPSULE ORAL
Qty: 90 CAPSULE | Refills: 0 | Status: SHIPPED | OUTPATIENT
Start: 2021-06-14 | End: 2021-08-31

## 2021-08-10 ENCOUNTER — OFFICE VISIT (OUTPATIENT)
Dept: FAMILY MEDICINE CLINIC | Age: 51
End: 2021-08-10
Payer: COMMERCIAL

## 2021-08-10 VITALS
TEMPERATURE: 98 F | SYSTOLIC BLOOD PRESSURE: 132 MMHG | RESPIRATION RATE: 10 BRPM | DIASTOLIC BLOOD PRESSURE: 83 MMHG | HEART RATE: 81 BPM | OXYGEN SATURATION: 99 %

## 2021-08-10 DIAGNOSIS — Z12.11 COLON CANCER SCREENING: ICD-10-CM

## 2021-08-10 DIAGNOSIS — S46.912A STRAIN OF LEFT SHOULDER, INITIAL ENCOUNTER: Primary | ICD-10-CM

## 2021-08-10 PROCEDURE — 99213 OFFICE O/P EST LOW 20 MIN: CPT | Performed by: FAMILY MEDICINE

## 2021-08-10 RX ORDER — PREDNISONE 10 MG/1
TABLET ORAL
Qty: 21 TABLET | Refills: 0 | Status: SHIPPED | OUTPATIENT
Start: 2021-08-10 | End: 2021-12-01

## 2021-08-10 NOTE — PROGRESS NOTES
Chief Complaint   Patient presents with    Shoulder Injury     Pt states he went to lift a box that is heavier than he thought. He now has left shoulder pain that is very sore and tender to touch.

## 2021-08-10 NOTE — PATIENT INSTRUCTIONS
Fecal Immunochemical Test (FIT): About This Test  What is it? A fecal immunochemical test, or FIT, checks for hidden blood in the stool. Your doctor gives you a kit that contains everything you need. At home, you follow simple steps to collect a small amount of stool. You return the kit to the doctor or to a lab. Why is this test done? This test is done to check for colorectal cancer. How is the test done? There are different types of home tests available. It's important to follow the instructions provided with any test.  Don't do the test during your menstrual period or if you are having bleeding from hemorrhoids. Here are some general instructions:  · Check the expiration date on the package. Don't use a test kit after its expiration date. · Follow the instructions exactly. Do all the steps in order, without skipping any of them. · After you finish your test, follow the instructions that you were given for returning the test.  For a FIT test, you put a small sample of stool in a tube or on a card that comes with the kit. You return this to your doctor or a lab. There is a FIT test that shows the results right away. If your test shows that blood was found in your stool sample, call your doctor as soon as you can. Follow-up care is a key part of your treatment and safety. Be sure to make and go to all appointments, and call your doctor if you are having problems. It's also a good idea to keep a list of the medicines you take. Ask your doctor when you can expect to have your test results. Where can you learn more? Go to http://www.gray.com/  Enter U766 in the search box to learn more about \"Fecal Immunochemical Test (FIT): About This Test.\"  Current as of: December 17, 2020               Content Version: 12.8  © 3635-9955 Healthwise, [employer].    Care instructions adapted under license by [employer] (which disclaims liability or warranty for this information). If you have questions about a medical condition or this instruction, always ask your healthcare professional. [place] [address] any warranty or liability for your use of this information.

## 2021-08-11 LAB — HEMOCCULT STL QL IA: NEGATIVE

## 2021-08-13 ENCOUNTER — TELEPHONE (OUTPATIENT)
Dept: FAMILY MEDICINE CLINIC | Age: 51
End: 2021-08-13

## 2021-08-13 NOTE — TELEPHONE ENCOUNTER
Ms. Berl Hashimoto called stating Mr. Esther Mahajan is still c/o pain from his rotator cuff. She wants to know if Dr. Susanne Banerjee can send something in for him. She added ibuprofen, advised her she can get that over the counter. Call [phone].

## 2021-08-17 ENCOUNTER — TELEPHONE (OUTPATIENT)
Dept: FAMILY MEDICINE CLINIC | Age: 51
End: 2021-08-17

## 2021-08-17 DIAGNOSIS — S46.912A STRAIN OF LEFT SHOULDER, INITIAL ENCOUNTER: Primary | ICD-10-CM

## 2021-08-18 RX ORDER — IBUPROFEN 800 MG/1
800 TABLET ORAL
Qty: 40 TABLET | Refills: 1 | Status: SHIPPED | OUTPATIENT
Start: 2021-08-18 | End: 2021-12-01 | Stop reason: SDUPTHER

## 2021-08-18 NOTE — TELEPHONE ENCOUNTER
Called both numbers on file and left message. Pt's FIT test is normal.     Dr Milena Driver has also sent in ibuprofen 800 mg to pharmacy on file for pt to take. He can d/c Steroids, if he hasn't already. Left message for patient at home number requesting return call.

## 2021-08-19 DIAGNOSIS — S46.912A STRAIN OF LEFT SHOULDER, INITIAL ENCOUNTER: Primary | ICD-10-CM

## 2021-08-19 NOTE — PROGRESS NOTES
Referral has been placed to [facility] for shoulder pain. Pt's wife is aware. She was given info to office. Also given pt's normal FIT results and told that ibuprofen 800 mg has been sent to the pharmacy.

## 2021-08-31 ENCOUNTER — OFFICE VISIT (OUTPATIENT)
Dept: ORTHOPEDIC SURGERY | Age: 51
End: 2021-08-31
Payer: COMMERCIAL

## 2021-08-31 VITALS
HEART RATE: 57 BPM | RESPIRATION RATE: 16 BRPM | OXYGEN SATURATION: 98 % | HEIGHT: 62 IN | TEMPERATURE: 98.7 F | BODY MASS INDEX: 34.23 KG/M2 | WEIGHT: 186 LBS

## 2021-08-31 DIAGNOSIS — M25.512 ACUTE PAIN OF LEFT SHOULDER: Primary | ICD-10-CM

## 2021-08-31 DIAGNOSIS — M75.102 NONTRAUMATIC TEAR OF LEFT ROTATOR CUFF, UNSPECIFIED TEAR EXTENT: ICD-10-CM

## 2021-08-31 DIAGNOSIS — M50.20 HERNIATED DISC, CERVICAL: ICD-10-CM

## 2021-08-31 PROCEDURE — 99203 OFFICE O/P NEW LOW 30 MIN: CPT | Performed by: ORTHOPAEDIC SURGERY

## 2021-08-31 PROCEDURE — 73030 X-RAY EXAM OF SHOULDER: CPT | Performed by: ORTHOPAEDIC SURGERY

## 2021-08-31 RX ORDER — MELOXICAM 15 MG/1
15 TABLET ORAL DAILY
Qty: 30 TABLET | Refills: 1 | Status: SHIPPED | OUTPATIENT
Start: 2021-08-31 | End: 2021-12-01

## 2021-08-31 RX ORDER — BACLOFEN 10 MG/1
10 TABLET ORAL 3 TIMES DAILY
Qty: 60 TABLET | Refills: 1 | Status: SHIPPED | OUTPATIENT
Start: 2021-08-31 | End: 2021-12-01

## 2021-08-31 NOTE — PROGRESS NOTES
Gumaroflorentinofavian Selmavashti Filippo II  1970   Chief Complaint   Patient presents with    Shoulder Pain     left        HISTORY OF PRESENT ILLNESS  Doron Singleton II is a 46 y.o. male who presents today for evaluation of the left shoulder. He rates his pain 6/10 today. Pain has been present for 2-3 weeks. Picked up a heavy box and felt like his arm collapsed. Limited ROM and pain at night. Has pain in the shoulder and shoulder blade. Radiating pain with associated numbness down the arm. Hx of pinched nerve in the neck and has not been seen for this in a while. Has tried taking steroids but had problems with his vertigo. Patient describes the pain as numbing, sharp and tingling that is Constant in nature. Symptoms are worse with lifting and , Activity and is better with  NSAIDs. Associated symptoms include numbness, tingling. Since problem started, it: is unchanged. Pain does wake patient up at night. Has taken NSAIDs for the problem. Has tried following treatments: Injections:NO; Brace:NO;  Therapy:NO; Cane/Crutch:NO       No Known Allergies     Past Medical History:   Diagnosis Date    Arthritis     bilat knees    GERD (gastroesophageal reflux disease)     Neck injury about 2007    hit head on pipe, in [place]iro, told he had HNP      Social History     Socioeconomic History    Marital status:      Spouse name: Not on file    Number of children: Not on file    Years of education: Not on file    Highest education level: Not on file   Occupational History    Not on file   Tobacco Use    Smoking status: Never Smoker    Smokeless tobacco: Never Used   Substance and Sexual Activity    Alcohol use: No    Drug use: No    Sexual activity: Not on file   Other Topics Concern    Not on file   Social History Narrative    Not on file     Social Determinants of Health     Financial Resource Strain:     Difficulty of Paying Living Expenses:    Food Insecurity:     Worried About Running Out of Food in the Last Year:    [address] in the Last Year:    Transportation Needs:     Lack of Transportation (Medical):  Lack of Transportation (Non-Medical):    Physical Activity:     Days of Exercise per Week:     Minutes of Exercise per Session:    Stress:     Feeling of Stress :    Social Connections:     Frequency of Communication with Friends and Family:     Frequency of Social Gatherings with Friends and Family:     Attends [employer] Services:     Active Member of Clubs or Organizations:     Attends Club or Organization Meetings:     Marital Status:    Intimate Partner Violence:     Fear of Current or Ex-Partner:     Emotionally Abused:     Physically Abused:     Sexually Abused:       Past Surgical History:   Procedure Laterality Date    FULL ESOPHAGEAL MANOMETRY  10/10/2018         HX HERNIA REPAIR  1992      Family History   Problem Relation Age of Onset    COPD Mother     Neuropathy Mother     No Known Problems Father     No Known Problems Sister     Diabetes Brother     GERD Brother     No Known Problems Brother     No Known Problems Sister     Cancer Other       Current Outpatient Medications   Medication Sig    amLODIPine-benazepril (LOTREL) 5-20 mg per capsule TAKE 1 CAPSULE BY MOUTH EVERY DAY    ibuprofen (MOTRIN) 800 mg tablet Take 1 Tablet by mouth every six (6) hours as needed for Pain.  predniSONE (STERAPRED DS) 10 mg dose pack See administration instruction per 10mg dose pack    esomeprazole (NEXIUM) 20 mg capsule Take  by mouth daily.  ibuprofen (MOTRIN) 800 mg tablet Take 1 Tab by mouth every eight (8) hours as needed for Pain.  acetaminophen (TYLENOL PO) Take  by mouth.  topiramate ER (TROKENDI XR) 25 mg capsule 1 tab po qhs for 1 week then increase to 2 tab po qhs thereafter.  diphenhydrAMINE (BENADRYL ALLERGY) 25 mg tablet Take 25 mg by mouth every six (6) hours as needed for Sleep.     omeprazole (PRILOSEC) 40 mg capsule TAKE ONE CAPSULE BY MOUTH TWICE A DAY    cyanocobalamin 1,000 mcg tablet Take 1,000 mcg by mouth daily.  glucosamine/chondr benz A sod (OSTEO BI-FLEX PO) Take  by mouth.  multivitamin (ONE A DAY) tablet Take 1 Tab by mouth daily.  gabapentin (NEURONTIN) 100 mg capsule Take 2 Caps by mouth three (3) times daily. TAKE 1 TABLET BY MOUTH 3 TIMES A DAY AS NEEDED FOR PAIN (Patient not taking: Reported on 8/31/2021)    pyridoxine, vitamin B6, (VITAMIN B-6) 100 mg tablet Take 100 mg by mouth daily. (Patient not taking: Reported on 8/31/2021)     No current facility-administered medications for this visit. REVIEW OF SYSTEM   Patient denies: Weight loss, Fever/Chills, HA, Visual changes, Fatigue, Chest pain, SOB, Abdominal pain, N/V/D/C, Blood in stool or urine, Edema. Pertinent positive as above in HPI. All others were negative    PHYSICAL EXAM:   Visit Vitals  Pulse (!) 57   Temp 98.7 °F (37.1 °C)   Resp 16   Ht 5' 2\" (1.575 m)   Wt 186 lb (84.4 kg)   SpO2 98%   BMI 34.02 kg/m²     The patient is a well-developed, well-nourished male   in no acute distress. The patient is alert and oriented times three. The patient is alert and oriented times three. Mood and affect are normal.  LYMPHATIC: lymph nodes are not enlarged and are within normal limits  SKIN: normal in color and non tender to palpation. There are no bruises or abrasions noted. NEUROLOGICAL: Motor sensory exam is within normal limits. Reflexes are equal bilaterally.  There is normal sensation to pinprick and light touch  MUSCULOSKELETAL:  Examination Left shoulder   Skin Intact   AC joint tenderness -   Biceps tenderness -   Forward flexion/Elevation    Active abduction    Glenohumeral abduction 90   External rotation ROM 60   Internal rotation ROM 30   Apprehension -   Woodrows Relocation -   Jerk -   Load and Shift -   Obriens -   Speeds -   Impingement sign +   Supraspinatus/Empty Can +   External Rotation Strength -, 5/5   Lift Off/Belly Press -, 5/5 Neurovascular Intact       IMAGING: XR of the left shoulder with 3 views obtained in the office dated 8/31/2021 was reviewed and read by Dr. Yfn Gallo: Type III acromion with slight proximal migration of the humeral head. IMPRESSION:      ICD-10-CM ICD-9-CM    1. Acute pain of left shoulder  M25.512 719.41 AMB POC XRAY, SHOULDER; COMPLETE, 2+   2. Herniated disc, cervical  M50.20 722.0 baclofen (LIORESAL) 10 mg tablet      MRI CERV SPINE WO CONT      meloxicam (MOBIC) 15 mg tablet   3. Nontraumatic tear of left rotator cuff, unspecified tear extent  M75.102 727.61 MRI SHOULDER LT WO CONT      baclofen (LIORESAL) 10 mg tablet      meloxicam (MOBIC) 15 mg tablet      REFERRAL TO ORTHOPEDICS        PLAN:  1. Patient presents today with left shoulder pain and I would like to order a L shoulder MRI to r/o a possible rotator cuff tear. He is also having symptoms c/w a herniated disc of the cervical spine, and I would like to order a MRI of the cervical spine to rule this out. Follow up in the office for the L shoulder MRI and follow up with spine center for the cervical spine. Prescribed baclofen and mobic to help with pain and inflammation. Risk factors include: BMI>30  2. No ultrasound exam indicated today  3. No cortisone injection indicated today   4. No Physical/Occupational Therapy indicated today  5. Yes diagnostic test indicated today: L SHOULDER MRI & CS MRI  6. No durable medical equipment indicated today  7. Yes referral indicated today Jamir  8. Yes medications indicated today: [address]  9. No Narcotic indicated today    RTC Follow up after MRI in the office and follow up with spine center for CS. Scribed by Octavio Ambrocio ([facility]) as dictated by Dorothy De La Garza MD    I, Dr. Dorothy De La Garza, confirm that all documentation is accurate.     Dorothy De La Garza M.D.   [facility]

## 2021-09-20 ENCOUNTER — HOSPITAL ENCOUNTER (OUTPATIENT)
Age: 51
Discharge: HOME | End: 2021-09-20
Attending: ORTHOPAEDIC SURGERY
Payer: COMMERCIAL

## 2021-09-20 DIAGNOSIS — M50.20 HERNIATED DISC, CERVICAL: ICD-10-CM

## 2021-09-20 DIAGNOSIS — M75.102 NONTRAUMATIC TEAR OF LEFT ROTATOR CUFF, UNSPECIFIED TEAR EXTENT: ICD-10-CM

## 2021-09-20 PROCEDURE — 73221 MRI JOINT UPR EXTREM W/O DYE: CPT

## 2021-09-20 PROCEDURE — 72141 MRI NECK SPINE W/O DYE: CPT

## 2021-09-21 NOTE — TELEPHONE ENCOUNTER
89 Patient's wife calling to get FIT results. She also stated that he just started the steroid b/c the pharmacy had to order it. Once he started it he noticed it messed with his vertigo and caused dizziness. He stopped it after 2 days. They would like to know if there is an alternative med. He has been using Ibuprofen 3-4 a day as of now.

## 2021-11-23 DIAGNOSIS — I10 HYPERTENSION, UNCONTROLLED: ICD-10-CM

## 2021-11-23 RX ORDER — AMLODIPINE AND BENAZEPRIL HYDROCHLORIDE 5; 20 MG/1; MG/1
CAPSULE ORAL
Qty: 90 CAPSULE | Refills: 0 | Status: SHIPPED | OUTPATIENT
Start: 2021-11-23 | End: 2022-02-22

## 2021-11-29 ENCOUNTER — TELEPHONE (OUTPATIENT)
Dept: FAMILY MEDICINE CLINIC | Age: 51
End: 2021-11-29

## 2021-11-29 NOTE — TELEPHONE ENCOUNTER
Pt's wife called stating that pt has been having chills, n/v, diarrhea since the weekend. She was wondering if he could get an abx and something for nausea sent in. I told her that dr Conchita Lyle is out of the office, but that I will send a message. I do not know how often he will be checking his messages. She expressed understanding.

## 2021-12-01 ENCOUNTER — VIRTUAL VISIT (OUTPATIENT)
Dept: FAMILY MEDICINE CLINIC | Age: 51
End: 2021-12-01
Payer: COMMERCIAL

## 2021-12-01 DIAGNOSIS — R05.9 COUGH: Primary | ICD-10-CM

## 2021-12-01 DIAGNOSIS — A09 DIARRHEA OF INFECTIOUS ORIGIN: ICD-10-CM

## 2021-12-01 PROCEDURE — 99213 OFFICE O/P EST LOW 20 MIN: CPT | Performed by: FAMILY MEDICINE

## 2021-12-01 RX ORDER — DIPHENOXYLATE HCL/ATROPINE 2.5-.025/5
5 LIQUID (ML) ORAL
Qty: 60 ML | Refills: 0 | Status: SHIPPED | OUTPATIENT
Start: 2021-12-01

## 2021-12-01 RX ORDER — AZITHROMYCIN 250 MG/1
TABLET, FILM COATED ORAL
Qty: 6 TABLET | Refills: 0 | Status: SHIPPED | OUTPATIENT
Start: 2021-12-01 | End: 2021-12-06

## 2021-12-01 NOTE — PROGRESS NOTES
Patient being seen today via VV c/o vomiting. He says his whole family has been sick with vomiting diarrhea and fever. Has coufg that is productive with yellow/brown mucus x 7 days. 1. \"Have you been to the ER, urgent care clinic since your last visit? Hospitalized since your last visit? \" No    2. \"Have you seen or consulted any other health care providers outside of the [address] [place] [address] since your last visit? \" No     3. For patients aged 39-70: Has the patient had a colonoscopy / FIT/ Cologuard? Yes, HM satisfied with blue hyperlink     If the patient is female:    4. For patients aged 41-77: Has the patient had a mammogram within the past 2 years? NA based on age or sex    11. For patients aged 21-65: Has the patient had a pap smear?  NA based on age or sex

## 2021-12-01 NOTE — PROGRESS NOTES
Katie Fragoso II is a 46 y.o. male who was seen by synchronous (real-time) audio-video technology on 12/1/2021 for Vomiting, Diarrhea, Cough, and Fever        Assessment & Plan:   Diagnoses and all orders for this visit:    1. Cough  -     azithromycin (ZITHROMAX) 250 mg tablet; Take 2 tablets today, then take 1 tablet daily    2. Diarrhea of infectious origin  -     diphenoxylate-atropine (LOMOTIL) 2.5-0.025 mg/5 mL liquid; Take 5 mL by mouth four (4) times daily as needed for Diarrhea. Max Daily Amount: 20 mL. 712  Subjective:       Prior to Admission medications    Medication Sig Start Date End Date Taking? Authorizing Provider   AMOXICILLIN PO Take  by mouth. Yes Provider, Historical   amLODIPine-benazepril (LOTREL) 5-20 mg per capsule TAKE 1 CAPSULE BY MOUTH EVERY DAY 11/23/21  Yes Dwight Márquez MD   esomeprazole (NEXIUM) 20 mg capsule Take  by mouth daily. Yes Provider, Historical   acetaminophen (TYLENOL PO) Take  by mouth. Yes Provider, Historical   diphenhydrAMINE (BENADRYL ALLERGY) 25 mg tablet Take 25 mg by mouth every six (6) hours as needed for Sleep. Yes Provider, Historical   cyanocobalamin 1,000 mcg tablet Take 1,000 mcg by mouth daily. Yes Provider, Historical   pyridoxine, vitamin B6, (VITAMIN B-6) 100 mg tablet Take 100 mg by mouth daily. Yes Provider, Historical   glucosamine/chondr benz A sod (OSTEO BI-FLEX PO) Take  by mouth. Yes Provider, Historical   multivitamin (ONE A DAY) tablet Take 1 Tab by mouth daily. Yes Provider, Historical   baclofen (LIORESAL) 10 mg tablet Take 1 Tablet by mouth three (3) times daily. Patient not taking: Reported on 12/1/2021 8/31/21 12/1/21  Sundar Harrison MD   meloxicam [facility]) 15 mg tablet Take 1 Tablet by mouth daily. Patient not taking: Reported on 12/1/2021 8/31/21 12/1/21  Sundar Harrison MD   ibuprofen (MOTRIN) 800 mg tablet Take 1 Tablet by mouth every six (6) hours as needed for Pain.   Patient not taking: Reported on 12/1/2021 8/18/21 12/1/21  Tigist Gomez MD   predniSONE [facility] DS) 10 mg dose pack See administration instruction per 10mg dose pack  Patient not taking: Reported on 12/1/2021 8/10/21 12/1/21  Tigist Gomez MD   ibuprofen (MOTRIN) 800 mg tablet Take 1 Tab by mouth every eight (8) hours as needed for Pain. Patient not taking: Reported on 12/1/2021 11/8/19 12/1/21  Елена Gil MD   topiramate ER (TROKENDI XR) 25 mg capsule 1 tab po qhs for 1 week then increase to 2 tab po qhs thereafter. Patient not taking: Reported on 12/1/2021 1/24/19   Maude Zuñiga MD   gabapentin (NEURONTIN) 100 mg capsule Take 2 Caps by mouth three (3) times daily. TAKE 1 TABLET BY MOUTH 3 TIMES A DAY AS NEEDED FOR PAIN  Patient not taking: Reported on 8/31/2021 1/17/19   Tigist Gomez MD   omeprazole (PRILOSEC) 40 mg capsule TAKE ONE CAPSULE BY MOUTH TWICE A DAY  Patient not taking: Reported on 12/1/2021 8/11/18 12/1/21  Provider, Historical     Patient Active Problem List   Diagnosis Code    Severe obesity (BMI 35.0-39. 9) E66.01    Left lumbar radiculitis M54.16    DDD (degenerative disc disease), lumbar M51.36    Chronic cluster headache, not intractable G44.029    Hypertension, uncontrolled I10     Past Medical History:   Diagnosis Date    Arthritis     bilat knees    GERD (gastroesophageal reflux disease)     Neck injury about 2007    hit head on pipe, in [place]iro, told he had HNP       Review of Systems   Constitutional: Negative for chills, fever, malaise/fatigue and weight loss. HENT: Positive for congestion. Eyes: Negative for blurred vision. Respiratory: Positive for cough. Negative for shortness of breath and wheezing. Cardiovascular: Negative for chest pain. Gastrointestinal: Negative for nausea and vomiting. Musculoskeletal: Negative for myalgias. Skin: Negative for rash. Neurological: Negative for weakness. Psychiatric/Behavioral: Negative.         Objective:   No flowsheet data found. General: alert, cooperative, no distress   Mental  status: normal mood, behavior, speech, dress, motor activity, and thought processes, able to follow commands   HENT: NCAT   Neck: no visualized mass   Resp: no respiratory distress   Neuro: no gross deficits   Skin: no discoloration or lesions of concern on visible areas   Psychiatric: normal affect, consistent with stated mood, no evidence of hallucinations     Additional exam findings: We discussed the expected course, resolution and complications of the diagnosis(es) in detail. Medication risks, benefits, costs, interactions, and alternatives were discussed as indicated. I advised him to contact the office if his condition worsens, changes or fails to improve as anticipated. He expressed understanding with the diagnosis(es) and plan. Surya Dasilva Filippo RADHA, was evaluated through a synchronous (real-time) audio-video encounter. The patient (or guardian if applicable) is aware that this is a billable service. Verbal consent to proceed has been obtained within the past 12 months. The visit was conducted pursuant to the emergency declaration under the [address] [place] [address] authority and the [employer] General Act. Patient identification was verified, and a caregiver was present when appropriate. The patient was located in a state where the provider was credentialed to provide care.     Kylee Us MD

## 2021-12-02 ENCOUNTER — TELEPHONE (OUTPATIENT)
Dept: FAMILY MEDICINE CLINIC | Age: 51
End: 2021-12-02

## 2021-12-02 NOTE — TELEPHONE ENCOUNTER
Patient's wife called and states the Lomotil is not covered by ins, it will be $91 and wont be in until Saturday. She is wondering if there is an alternative. She can be reached at [phone].

## 2021-12-08 NOTE — TELEPHONE ENCOUNTER
Per dr Quan Vilchis pt can try miralax. Left message for patient at home number requesting return call.

## 2022-02-20 DIAGNOSIS — I10 HYPERTENSION, UNCONTROLLED: ICD-10-CM

## 2022-02-22 RX ORDER — AMLODIPINE AND BENAZEPRIL HYDROCHLORIDE 5; 20 MG/1; MG/1
CAPSULE ORAL
Qty: 90 CAPSULE | Refills: 0 | Status: SHIPPED | OUTPATIENT
Start: 2022-02-22 | End: 2022-06-07

## 2022-03-09 ENCOUNTER — PATIENT MESSAGE (OUTPATIENT)
Dept: FAMILY MEDICINE CLINIC | Age: 52
End: 2022-03-09

## 2022-03-09 ENCOUNTER — NURSE TRIAGE (OUTPATIENT)
Dept: OTHER | Facility: CLINIC | Age: 52
End: 2022-03-09

## 2022-03-09 DIAGNOSIS — L03.90 CELLULITIS, UNSPECIFIED CELLULITIS SITE: Primary | ICD-10-CM

## 2022-03-09 RX ORDER — AMOXICILLIN AND CLAVULANATE POTASSIUM 875; 125 MG/1; MG/1
1 TABLET, FILM COATED ORAL EVERY 12 HOURS
Qty: 20 TABLET | Refills: 0 | Status: SHIPPED | OUTPATIENT
Start: 2022-03-09 | End: 2022-03-19

## 2022-03-09 NOTE — TELEPHONE ENCOUNTER
Per dr Dave Diaz Augmentin 610 sent to pharmacy. Pt is to start taking it today and keep appt for tomorrow. I msged pt back via [employer].

## 2022-03-09 NOTE — TELEPHONE ENCOUNTER
Received call from Gina Kidd at [facility] with [employer] Complaint. Subjective: Caller states \"There is a sore under his belly button. It is red and hot. He does get boils every now and then, but never got one on his stomach. I don't know if a spider or a tick bit him. It looks infected. It is large. It is hot to touch and red and hard around it. \"     Current Symptoms: Sore on belly    Onset: 3 days ago; worsening    Associated Symptoms: NA    Pain Severity: unable to assess, patient not with caller    Temperature: none     What has been tried: ointment, made it worse    LMP: NA Pregnant: NA    Recommended disposition: See in Office Today    Care advice provided, patient verbalizes understanding; denies any other questions or concerns; instructed to call back for any new or worsening symptoms. Patient/Caller agrees with recommended disposition; writer provided warm transfer to [facility]  at [facility] for appointment scheduling    Attention Provider: Thank you for allowing me to participate in the care of your patient. The patient was connected to triage in response to information provided to the [facility]. Please do not respond through this encounter as the response is not directed to a shared pool.       Reason for Disposition   Skin redness around the wound larger than 2 inches (5 cm)    Protocols used: WOUND INFECTION-ADULT-[place]

## 2022-03-10 ENCOUNTER — OFFICE VISIT (OUTPATIENT)
Dept: FAMILY MEDICINE CLINIC | Age: 52
End: 2022-03-10
Payer: COMMERCIAL

## 2022-03-10 VITALS
TEMPERATURE: 97.6 F | WEIGHT: 200 LBS | SYSTOLIC BLOOD PRESSURE: 124 MMHG | BODY MASS INDEX: 36.58 KG/M2 | OXYGEN SATURATION: 96 % | RESPIRATION RATE: 10 BRPM | DIASTOLIC BLOOD PRESSURE: 80 MMHG | HEART RATE: 65 BPM

## 2022-03-10 DIAGNOSIS — L02.91 ABSCESS: Primary | ICD-10-CM

## 2022-03-10 PROCEDURE — 99213 OFFICE O/P EST LOW 20 MIN: CPT | Performed by: FAMILY MEDICINE

## 2022-03-10 RX ORDER — CIPROFLOXACIN 500 MG/1
500 TABLET ORAL 2 TIMES DAILY
Qty: 20 TABLET | Refills: 0 | Status: SHIPPED | OUTPATIENT
Start: 2022-03-10 | End: 2022-03-20

## 2022-03-10 NOTE — PROGRESS NOTES
Bob Hernandez II is a 46 y.o. male  presents for red area on abdo wall. Not sure if it was a sting or bite. No drainage. He was seen at urgent care and given augmentin. No Known Allergies  Outpatient Medications Marked as Taking for the 3/10/22 encounter (Office Visit) with Bakari Rosenberg MD   Medication Sig Dispense Refill    amoxicillin-clavulanate (AUGMENTIN) 875-125 mg per tablet Take 1 Tablet by mouth every twelve (12) hours for 10 days. 20 Tablet 0    amLODIPine-benazepril (LOTREL) 5-20 mg per capsule TAKE 1 CAPSULE BY MOUTH EVERY DAY 90 Capsule 0    diphenoxylate-atropine (LOMOTIL) 2.5-0.025 mg/5 mL liquid Take 5 mL by mouth four (4) times daily as needed for Diarrhea. Max Daily Amount: 20 mL. 60 mL 0    esomeprazole (NEXIUM) 20 mg capsule Take  by mouth daily.  acetaminophen (TYLENOL PO) Take  by mouth.  topiramate ER (TROKENDI XR) 25 mg capsule 1 tab po qhs for 1 week then increase to 2 tab po qhs thereafter. 60 Cap 0    diphenhydrAMINE (BENADRYL ALLERGY) 25 mg tablet Take 25 mg by mouth every six (6) hours as needed for Sleep.  cyanocobalamin 1,000 mcg tablet Take 1,000 mcg by mouth daily.  pyridoxine, vitamin B6, (VITAMIN B-6) 100 mg tablet Take 100 mg by mouth daily.  glucosamine/chondr benz A sod (OSTEO BI-FLEX PO) Take  by mouth.  multivitamin (ONE A DAY) tablet Take 1 Tab by mouth daily. Patient Active Problem List   Diagnosis Code    Severe obesity (BMI 35.0-39. 9) E66.01    Left lumbar radiculitis M54.16    DDD (degenerative disc disease), lumbar M51.36    Chronic cluster headache, not intractable G44.029    Hypertension, uncontrolled I10     Past Medical History:   Diagnosis Date    Arthritis     bilat knees    GERD (gastroesophageal reflux disease)     Neck injury about 2007    hit head on pipe, in [place], tx chiro, told he had HNP     Social History     Socioeconomic History    Marital status:    Tobacco Use    Smoking status: Never Smoker    Smokeless tobacco: Never Used   Substance and Sexual Activity    Alcohol use: No    Drug use: No     Family History   Problem Relation Age of Onset    COPD Mother     Neuropathy Mother     No Known Problems Father     No Known Problems Sister     Diabetes Brother     GERD Brother     No Known Problems Brother     No Known Problems Sister     Cancer Other         Review of Systems   Constitutional: Negative for chills, fever, malaise/fatigue and weight loss. Eyes: Negative for blurred vision. Respiratory: Negative for cough, shortness of breath and wheezing. Cardiovascular: Negative for chest pain. Gastrointestinal: Negative for nausea and vomiting. Musculoskeletal: Negative for myalgias. Skin: Negative for rash (lesion on abdo wall left side. ). Neurological: Negative for weakness. Vitals:    03/10/22 1516   BP: 124/80   Pulse: 65   Resp: 10   Temp: 97.6 °F (36.4 °C)   TempSrc: Tympanic   SpO2: 96%   Weight: 200 lb (90.7 kg)   PainSc:   0 - No pain       Physical Exam  Vitals and nursing note reviewed. Neck:      Thyroid: No thyromegaly. Cardiovascular:      Rate and Rhythm: Normal rate and regular rhythm. Heart sounds: Normal heart sounds. Pulmonary:      Effort: Pulmonary effort is normal.      Breath sounds: Normal breath sounds. Musculoskeletal:         General: Normal range of motion. Cervical back: Normal range of motion and neck supple. Skin:     General: Skin is warm and dry. Findings: Lesion (tender area on left lower abdo wall no drianage. ) present. Neurological:      General: No focal deficit present. Mental Status: He is alert and oriented to person, place, and time. Assessment/Plan      ICD-10-CM ICD-9-CM    1. Abscess  L02.91 682.9 ciprofloxacin HCl (CIPRO) 500 mg tablet     I have discussed the diagnosis with the patient and the intended plan of care as seen in the above orders.  The patient has received an after-visit summary and questions were answered concerning future plans. I have discussed medication, side effects, and warnings with the patient in detail. The patient verbalized understanding and is in agreement with the plan of care. The patient will contact the office with any additional concerns.         lab results and schedule of future lab studies reviewed with patient    Kathie Griffiths MD

## 2022-03-10 NOTE — PROGRESS NOTES
Patient says he is not sure if he was bitten on his stomach by a spider. He noticed lesion on Saturday. 1. \"Have you been to the ER, urgent care clinic since your last visit? Hospitalized since your last visit? \" No    2. \"Have you seen or consulted any other health care providers outside of the [address] [place] [address] since your last visit? \" No     3. For patients aged 39-70: Has the patient had a colonoscopy / FIT/ Cologuard? Yes - no Care Gap present      If the patient is female:    4. For patients aged 41-77: Has the patient had a mammogram within the past 2 years? NA - based on age or sex      11. For patients aged 21-65: Has the patient had a pap smear?  NA - based on age or sex

## 2022-03-10 NOTE — PATIENT INSTRUCTIONS
Skin Abscess: Care Instructions  Overview     A skin abscess is a bacterial infection that forms a pocket of pus. A boil is a kind of skin abscess. The doctor may have cut an opening in the abscess so that the pus can drain out. You may have gauze in the cut so that the abscess will stay open and keep draining. You may need antibiotics. You will need to follow up with your doctor to make sure the infection has gone away. The doctor has checked you carefully, but problems can develop later. If you notice any problems or new symptoms, get medical treatment right away. Follow-up care is a key part of your treatment and safety. Be sure to make and go to all appointments, and call your doctor if you are having problems. It's also a good idea to know your test results and keep a list of the medicines you take. How can you care for yourself at home? · Apply warm and dry compresses, a heating pad set on low, or a hot water bottle 3 or 4 times a day for pain. Keep a cloth between the heat source and your skin. · If your doctor prescribed antibiotics, take them as directed. Do not stop taking them just because you feel better. You need to take the full course of antibiotics. · Take pain medicines exactly as directed. ? If the doctor gave you a prescription medicine for pain, take it as prescribed. ? If you are not taking a prescription pain medicine, ask your doctor if you can take an over-the-counter medicine. · Keep your bandage clean and dry. Change the bandage whenever it gets wet or dirty, or at least one time a day. · If the abscess was packed with gauze:  ? Keep follow-up appointments to have the gauze changed or removed. If the doctor instructed you to remove the gauze, follow the instructions you were given for how to remove it. ? After the gauze is removed, soak the area in warm water for 15 to 20 minutes 2 times a day, until the wound closes. When should you call for help?    Call your doctor now or seek immediate medical care if:    · You have signs of worsening infection, such as:  ? Increased pain, swelling, warmth, or redness. ? Red streaks leading from the infected skin. ? Pus draining from the wound. ? A fever. Watch closely for changes in your health, and be sure to contact your doctor if:    · You do not get better as expected. Where can you learn more? Go to http://www.gray.com/  Enter I297 in the search box to learn more about \"Skin Abscess: Care Instructions. \"  Current as of: November 15, 2021               Content Version: 13.2  © 5897-1605 [employer]. Care instructions adapted under license by [employer] (which disclaims liability or warranty for this information). If you have questions about a medical condition or this instruction, always ask your healthcare professional. [address] any warranty or liability for your use of this information.

## 2022-03-18 PROBLEM — M51.36 DDD (DEGENERATIVE DISC DISEASE), LUMBAR: Status: ACTIVE | Noted: 2018-09-13

## 2022-03-18 PROBLEM — M51.369 DDD (DEGENERATIVE DISC DISEASE), LUMBAR: Status: ACTIVE | Noted: 2018-09-13

## 2022-03-18 PROBLEM — I10 HYPERTENSION, UNCONTROLLED: Status: ACTIVE | Noted: 2018-10-30

## 2022-03-19 PROBLEM — M54.16 LEFT LUMBAR RADICULITIS: Status: ACTIVE | Noted: 2018-09-13

## 2022-03-19 PROBLEM — G44.029 CHRONIC CLUSTER HEADACHE, NOT INTRACTABLE: Status: ACTIVE | Noted: 2018-10-30

## 2022-04-21 ENCOUNTER — PATIENT MESSAGE (OUTPATIENT)
Dept: FAMILY MEDICINE CLINIC | Age: 52
End: 2022-04-21

## 2022-04-21 DIAGNOSIS — L03.90 CELLULITIS, UNSPECIFIED CELLULITIS SITE: ICD-10-CM

## 2022-04-21 DIAGNOSIS — L02.91 ABSCESS: Primary | ICD-10-CM

## 2022-04-22 RX ORDER — CIPROFLOXACIN 500 MG/1
500 TABLET ORAL 2 TIMES DAILY
Qty: 20 TABLET | Refills: 1 | Status: SHIPPED | OUTPATIENT
Start: 2022-04-22 | End: 2022-05-02

## 2022-05-12 ENCOUNTER — PATIENT MESSAGE (OUTPATIENT)
Dept: FAMILY MEDICINE CLINIC | Age: 52
End: 2022-05-12

## 2022-05-12 DIAGNOSIS — L02.91 ABSCESS: Primary | ICD-10-CM

## 2022-05-12 DIAGNOSIS — L03.90 CELLULITIS, UNSPECIFIED CELLULITIS SITE: ICD-10-CM

## 2022-05-12 RX ORDER — SULFAMETHOXAZOLE AND TRIMETHOPRIM 800; 160 MG/1; MG/1
1 TABLET ORAL 2 TIMES DAILY
Qty: 20 TABLET | Refills: 0 | Status: SHIPPED | OUTPATIENT
Start: 2022-05-12 | End: 2022-06-09

## 2022-05-12 NOTE — TELEPHONE ENCOUNTER
Per dr price's verbal order w/readback,  Bactrim 1 pill BID x 10 days sent to pharmacy. I consulted with Dr. Johana Garrison to let him know of the high interaction between bactrim and amlodipine. per his verbal order, bactrim can be sent in for pt.        Pt and pt's wife are aware that med has been sent in

## 2022-06-06 DIAGNOSIS — I10 HYPERTENSION, UNCONTROLLED: ICD-10-CM

## 2022-06-07 RX ORDER — AMLODIPINE AND BENAZEPRIL HYDROCHLORIDE 5; 20 MG/1; MG/1
CAPSULE ORAL
Qty: 90 CAPSULE | Refills: 1 | Status: SHIPPED | OUTPATIENT
Start: 2022-06-07

## 2022-06-08 ENCOUNTER — TELEPHONE (OUTPATIENT)
Dept: FAMILY MEDICINE CLINIC | Age: 52
End: 2022-06-08

## 2022-06-08 DIAGNOSIS — L03.90 CELLULITIS, UNSPECIFIED CELLULITIS SITE: ICD-10-CM

## 2022-06-08 DIAGNOSIS — L02.91 ABSCESS: ICD-10-CM

## 2022-06-08 NOTE — TELEPHONE ENCOUNTER
Patient wife called stating that she uploaded a picture of her     bump on the back of his leg. (Boil) she's  requesting an antibiotic. Please review and advise.

## 2022-06-09 ENCOUNTER — PATIENT MESSAGE (OUTPATIENT)
Dept: FAMILY MEDICINE CLINIC | Age: 52
End: 2022-06-09

## 2022-06-09 DIAGNOSIS — N52.9 ERECTILE DYSFUNCTION, UNSPECIFIED ERECTILE DYSFUNCTION TYPE: Primary | ICD-10-CM

## 2022-06-09 RX ORDER — SULFAMETHOXAZOLE AND TRIMETHOPRIM 800; 160 MG/1; MG/1
TABLET ORAL
Qty: 20 TABLET | Refills: 0 | Status: SHIPPED | OUTPATIENT
Start: 2022-06-09 | End: 2022-09-21

## 2022-06-09 RX ORDER — SILDENAFIL 50 MG/1
50 TABLET, FILM COATED ORAL
Qty: 6 TABLET | Refills: 2 | Status: SHIPPED | OUTPATIENT
Start: 2022-06-09

## 2022-06-09 NOTE — TELEPHONE ENCOUNTER
Per dr Latonia Yan         sildenafil citrate (Viagra) 50 mg tablet [[id number]]     Order Details  Dose: 50 mg Route: Oral Frequency: DAILY AS NEEDED for Erectile Dysfunction   Dispense Quantity: 6 Tablet Refills: 2          Sig: Take 1 Tablet by mouth daily as needed for Erectile Dysfunction.         Start Date: 06/09/22 End Date: --   Written Date: 06/09/22 Expiration Date: --       Diagnosis Association: Erectile dysfunction, unspecified erectile dysfunction type (N52.9)      sent to the pharmacy.

## 2022-06-09 NOTE — TELEPHONE ENCOUNTER
Mrs Adalid Dove called  regarding her  Rx for Viagra, please review and advise.  She can be reached .[phone]

## 2022-07-27 ENCOUNTER — TELEPHONE (OUTPATIENT)
Dept: FAMILY MEDICINE CLINIC | Age: 52
End: 2022-07-27

## 2022-07-27 DIAGNOSIS — R11.2 NAUSEA AND VOMITING, UNSPECIFIED VOMITING TYPE: Primary | ICD-10-CM

## 2022-07-27 RX ORDER — ONDANSETRON 8 MG/1
8 TABLET, ORALLY DISINTEGRATING ORAL
Qty: 20 TABLET | Refills: 1 | Status: SHIPPED | OUTPATIENT
Start: 2022-07-27 | End: 2022-08-01

## 2022-07-27 NOTE — TELEPHONE ENCOUNTER
Patients wife called the office back again stating patient is now vomiting, she called the office earlier today stating patient was having nausea and diarrhea. She says patients symptoms began on Sat 7/23, their son was recently diagnosed with a \"stomach bug\" and was having the same symptoms. Mrs. Alcira Salas is asking if something can be sent in for patient, please advise.

## 2022-07-27 NOTE — TELEPHONE ENCOUNTER
Jesusa Phalen, MD  You 13 minutes ago (2:23 PM)     ES    Zofran has been sent in for him    Called Margaret Haleigh Majors back she has been made aware Rafy Dobbs has been sent in for patient.

## 2022-07-27 NOTE — TELEPHONE ENCOUNTER
CHECK ONBASE FOR SCAN     Pts' wife called to request a medication on behalf of pt. She states that pt has been having symptoms of nausea, diarrhea, and bad taste in mouth. Pt has also been having bowel movements about 3-4 times a day. Pts' symptoms started on 7/23 and has tried pepto bismol but has not seen any results. Pt is requesting a stronger medication to help to stop him from using the bathroom so frequently before he goes out of town 7/28. Please review and advise as soon as possible.

## 2022-09-19 DIAGNOSIS — L02.91 ABSCESS: ICD-10-CM

## 2022-09-19 DIAGNOSIS — L03.90 CELLULITIS, UNSPECIFIED CELLULITIS SITE: ICD-10-CM

## 2022-09-21 ENCOUNTER — OFFICE VISIT (OUTPATIENT)
Dept: FAMILY MEDICINE CLINIC | Age: 52
End: 2022-09-21
Payer: COMMERCIAL

## 2022-09-21 VITALS
BODY MASS INDEX: 36.76 KG/M2 | OXYGEN SATURATION: 96 % | WEIGHT: 201 LBS | HEART RATE: 86 BPM | TEMPERATURE: 97 F | RESPIRATION RATE: 12 BRPM | DIASTOLIC BLOOD PRESSURE: 80 MMHG | SYSTOLIC BLOOD PRESSURE: 144 MMHG

## 2022-09-21 DIAGNOSIS — L02.92 BOIL: ICD-10-CM

## 2022-09-21 DIAGNOSIS — M25.562 ACUTE PAIN OF LEFT KNEE: Primary | ICD-10-CM

## 2022-09-21 DIAGNOSIS — I10 PRIMARY HYPERTENSION: ICD-10-CM

## 2022-09-21 PROCEDURE — 99214 OFFICE O/P EST MOD 30 MIN: CPT | Performed by: FAMILY MEDICINE

## 2022-09-21 RX ORDER — AMOXICILLIN AND CLAVULANATE POTASSIUM 875; 125 MG/1; MG/1
1 TABLET, FILM COATED ORAL 2 TIMES DAILY
Qty: 20 TABLET | Refills: 0 | Status: SHIPPED | OUTPATIENT
Start: 2022-09-21 | End: 2022-10-01

## 2022-09-21 NOTE — PROGRESS NOTES
[place] II is a 45 y.o. male  presents for lesion on abdo wall left knee pain and elevated BP. No assoc fever or chills. No drainage from lesion on abdo    No Known Allergies  Outpatient Medications Marked as Taking for the 9/21/22 encounter (Office Visit) with Annia Becker MD   Medication Sig Dispense Refill    sildenafil citrate (Viagra) 50 mg tablet Take 1 Tablet by mouth daily as needed for Erectile Dysfunction. 6 Tablet 2    amLODIPine-benazepril (LOTREL) 5-20 mg per capsule TAKE 1 CAPSULE BY MOUTH EVERY DAY 90 Capsule 1    diphenoxylate-atropine (LOMOTIL) 2.5-0.025 mg/5 mL liquid Take 5 mL by mouth four (4) times daily as needed for Diarrhea. Max Daily Amount: 20 mL. 60 mL 0    esomeprazole (NEXIUM) 20 mg capsule Take  by mouth daily. acetaminophen (TYLENOL PO) Take  by mouth. topiramate ER (TROKENDI XR) 25 mg capsule 1 tab po qhs for 1 week then increase to 2 tab po qhs thereafter. 60 Cap 0    diphenhydrAMINE (BENADRYL) 25 mg tablet Take 25 mg by mouth every six (6) hours as needed for Sleep. cyanocobalamin 1,000 mcg tablet Take 1,000 mcg by mouth daily. pyridoxine, vitamin B6, (VITAMIN B-6) 100 mg tablet Take 100 mg by mouth daily. glucosamine/chondr benz A sod (OSTEO BI-FLEX PO) Take  by mouth.      multivitamin (ONE A DAY) tablet Take 1 Tab by mouth daily. Patient Active Problem List   Diagnosis Code    Severe obesity (BMI 35.0-39. 9) E66.01    Left lumbar radiculitis M54.16    DDD (degenerative disc disease), lumbar M51.36    Chronic cluster headache, not intractable G44.029    Hypertension, uncontrolled I10     Past Medical History:   Diagnosis Date    Arthritis     bilat knees    GERD (gastroesophageal reflux disease)     Neck injury about 2007    hit head on pipe, in [place], tx chiro, told he had HNP     Social History     Socioeconomic History    Marital status:    Tobacco Use    Smoking status: Never    Smokeless tobacco: Never   Substance and Sexual Activity    Alcohol use: No    Drug use: No     Family History   Problem Relation Age of Onset    COPD Mother     Neuropathy Mother     No Known Problems Father     No Known Problems Sister     Diabetes Brother     GERD Brother     No Known Problems Brother     No Known Problems Sister     Cancer Other         Review of Systems   Constitutional:  Negative for chills, fever, malaise/fatigue and weight loss. Eyes:  Negative for blurred vision. Respiratory:  Negative for cough, shortness of breath and wheezing. Cardiovascular:  Negative for chest pain. Gastrointestinal:  Negative for nausea and vomiting. Musculoskeletal:  Positive for joint pain. Negative for myalgias. Skin:  Negative for rash. Neurological:  Negative for weakness. Psychiatric/Behavioral: Negative. Vitals:    09/21/22 1133   BP: (!) 144/80   Pulse: 86   Resp: 12   Temp: 97 °F (36.1 °C)   TempSrc: Tympanic   SpO2: 96%   Weight: 201 lb (91.2 kg)   PainSc:   6   PainLoc: Knee       Physical Exam  Vitals and nursing note reviewed. Constitutional:       Appearance: Normal appearance. He is obese. Cardiovascular:      Rate and Rhythm: Normal rate and regular rhythm. Heart sounds: Normal heart sounds. Pulmonary:      Effort: Pulmonary effort is normal.      Breath sounds: Normal breath sounds. Musculoskeletal:         General: Tenderness (left knee tenderness. FROM) present. Normal range of motion. Cervical back: Normal range of motion and neck supple. Skin:     General: Skin is warm and dry. Findings: Lesion (mod tender area on right lower abdo wall with some drainage.) present. Neurological:      General: No focal deficit present. Mental Status: He is alert and oriented to person, place, and time. Psychiatric:         Mood and Affect: Mood normal.         Behavior: Behavior normal.         Thought Content:  Thought content normal.         Judgment: Judgment normal.       Assessment/Plan      ICD-10-CM ICD-9-CM    1. Acute pain of left knee  M25.562 719.46 REFERRAL TO ORTHOPEDICS      2. Primary hypertension  I10 401.9       3. Boil  L02.92 680.9 amoxicillin-clavulanate (AUGMENTIN) 875-125 mg per tablet        I have discussed the diagnosis with the patient and the intended plan of care as seen in the above orders. The patient has received an after-visit summary and questions were answered concerning future plans. I have discussed medication, side effects, and warnings with the patient in detail. The patient verbalized understanding and is in agreement with the plan of care. The patient will contact the office with any additional concerns.       lab results and schedule of future lab studies reviewed with patient    Arnulfo Lyles MD

## 2022-09-21 NOTE — PROGRESS NOTES
Chief Complaint   Patient presents with    Hypertension     Patient here today for HTN follow up. He says he has been having some pain in his left knee and would like Dr. Jade Evangelista to look at this. 1. \"Have you been to the ER, urgent care clinic since your last visit? Hospitalized since your last visit? \" No    2. \"Have you seen or consulted any other health care providers outside of the [address] [place] [address] since your last visit? \" No     3. For patients aged 39-70: Has the patient had a colonoscopy / FIT/ Cologuard? Yes - no Care Gap present      If the patient is female:    4. For patients aged 41-77: Has the patient had a mammogram within the past 2 years? NA - based on age or sex      11. For patients aged 21-65: Has the patient had a pap smear?  NA - based on age or sex

## 2022-09-22 RX ORDER — SULFAMETHOXAZOLE AND TRIMETHOPRIM 800; 160 MG/1; MG/1
TABLET ORAL
Qty: 20 TABLET | Refills: 0 | Status: SHIPPED | OUTPATIENT
Start: 2022-09-22

## 2022-09-30 ENCOUNTER — OFFICE VISIT (OUTPATIENT)
Dept: ORTHOPEDIC SURGERY | Age: 52
End: 2022-09-30
Payer: COMMERCIAL

## 2022-09-30 VITALS
OXYGEN SATURATION: 95 % | HEIGHT: 62 IN | WEIGHT: 203 LBS | HEART RATE: 67 BPM | TEMPERATURE: 98 F | BODY MASS INDEX: 37.36 KG/M2

## 2022-09-30 DIAGNOSIS — M25.562 ACUTE PAIN OF LEFT KNEE: ICD-10-CM

## 2022-09-30 DIAGNOSIS — M17.12 PRIMARY OSTEOARTHRITIS OF LEFT KNEE: Primary | ICD-10-CM

## 2022-09-30 PROCEDURE — 73562 X-RAY EXAM OF KNEE 3: CPT | Performed by: SPECIALIST

## 2022-09-30 PROCEDURE — 20610 DRAIN/INJ JOINT/BURSA W/O US: CPT | Performed by: SPECIALIST

## 2022-09-30 PROCEDURE — 99214 OFFICE O/P EST MOD 30 MIN: CPT | Performed by: SPECIALIST

## 2022-09-30 RX ORDER — BETAMETHASONE SODIUM PHOSPHATE AND BETAMETHASONE ACETATE 3; 3 MG/ML; MG/ML
3 INJECTION, SUSPENSION INTRA-ARTICULAR; INTRALESIONAL; INTRAMUSCULAR; SOFT TISSUE ONCE
Status: COMPLETED | OUTPATIENT
Start: 2022-09-30 | End: 2022-09-30

## 2022-09-30 RX ADMIN — BETAMETHASONE SODIUM PHOSPHATE AND BETAMETHASONE ACETATE 3 MG: 3; 3 INJECTION, SUSPENSION INTRA-ARTICULAR; INTRALESIONAL; INTRAMUSCULAR; SOFT TISSUE at 14:37

## 2022-09-30 NOTE — PROGRESS NOTES
Patient: Nano Livingston                MRN: [id number]       SSN: [id number]  YOB: 1970        AGE: 45 y.o. SEX: male    PCP: Jesusa Phalen, MD  09/30/22    CC: BILATERAL KNEE PAIN L>R    HISTORY:  Corrine Recinos II is a 45 y.o. male who is seen for bilateral knee pain. He felt a sudden pain when he jammed his left knee while shoveling dirt in his yard for his wife a couple of weeks ago. He has been experiencing bilateral knee pain for the past couple of months but mostly left knee pain recently. He feels left knee pain with standing, walking and stair climbing. He experiences  startup pain after sitting. 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 He lives in [place] with his wife, 2 yo son, and 10 yo daughter. He also has a 22 yo daughter from a previous marriage. Mr. Neymar Bryant weighs 203 lbs and is 5'2\" tall. He has four pasture horses and ten cows.      No results found for: HBA1C, LMM0RFBQ, WXH2REGT, JNE9YSQM  Weight Metrics 9/30/2022 9/21/2022 3/10/2022 8/31/2021 1/10/2020 11/8/2019 9/16/2019   Weight 203 lb 201 lb 200 lb 186 lb 203 lb 205 lb 202 lb   BMI 37.13 kg/m2 36.76 kg/m2 36.58 kg/m2 34.02 kg/m2 37.13 kg/m2 37.49 kg/m2 36.95 kg/m2       Patient Active Problem List   Diagnosis Code Severe obesity (BMI 35.0-39. 9) E66.01    Left lumbar radiculitis M54.16    DDD (degenerative disc disease), lumbar M51.36    Chronic cluster headache, not intractable G44.029    Hypertension, uncontrolled I10     REVIEW OF SYSTEMS:    Constitutional Symptoms: Negative   Eyes: Negative   Ears, Nose, Throat and Mouth: Negative   Cardiovascular: Negative   Respiratory: Negative   Genitourinary: Per HPI   Gastrointestinal: Per HPI   Integumentary (Skin and/or Breast): Negative   Musculoskeletal: Per HPI   Endocrine/Rheumatologic: Negative   Neurological: Per HPI   Hematology/Lymphatic: Negative    Allergic/Immunologic: Negative   Phychiatric: Negative    Social History     Socioeconomic History    Marital status:      Spouse name: Not on file    Number of children: Not on file    Years of education: Not on file    Highest education level: Not on file   Occupational History    Not on file   Tobacco Use    Smoking status: Never    Smokeless tobacco: Never   Substance and Sexual Activity    Alcohol use: No    Drug use: No    Sexual activity: Not on file   Other Topics Concern    Not on file   Social History Narrative    Not on file     Social Determinants of Health     Financial Resource Strain: Not on file   Food Insecurity: Not on file   Transportation Needs: Not on file   Physical Activity: Not on file   Stress: Not on file   Social Connections: Not on file   Intimate Partner Violence: Not on file   Housing Stability: Not on file      No Known Allergies   Current Outpatient Medications   Medication Sig    amoxicillin-clavulanate (AUGMENTIN) 875-125 mg per tablet Take 1 Tablet by mouth two (2) times a day for 10 days. amLODIPine-benazepril (LOTREL) 5-20 mg per capsule TAKE 1 CAPSULE BY MOUTH EVERY DAY    esomeprazole (NEXIUM) 20 mg capsule Take  by mouth daily. pyridoxine, vitamin B6, (VITAMIN B-6) 100 mg tablet Take 100 mg by mouth daily. glucosamine/chondr benz A sod (OSTEO BI-FLEX PO) Take  by mouth. multivitamin (ONE A DAY) tablet Take 1 Tab by mouth daily. trimethoprim-sulfamethoxazole (BACTRIM DS, SEPTRA DS) 160-800 mg per tablet TAKE 1 TABLET BY MOUTH TWICE A DAY FOR 10 DAYS (Patient not taking: Reported on 9/30/2022)    sildenafil citrate (Viagra) 50 mg tablet Take 1 Tablet by mouth daily as needed for Erectile Dysfunction. (Patient not taking: Reported on 9/30/2022)    diphenoxylate-atropine (LOMOTIL) 2.5-0.025 mg/5 mL liquid Take 5 mL by mouth four (4) times daily as needed for Diarrhea. Max Daily Amount: 20 mL. (Patient not taking: Reported on 9/30/2022)    acetaminophen (TYLENOL PO) Take  by mouth. (Patient not taking: Reported on 9/30/2022)    topiramate ER (TROKENDI XR) 25 mg capsule 1 tab po qhs for 1 week then increase to 2 tab po qhs thereafter. (Patient not taking: Reported on 9/30/2022)    diphenhydrAMINE (BENADRYL) 25 mg tablet Take 25 mg by mouth every six (6) hours as needed for Sleep. (Patient not taking: Reported on 9/30/2022)    cyanocobalamin 1,000 mcg tablet Take 1,000 mcg by mouth daily. (Patient not taking: Reported on 9/30/2022)     No current facility-administered medications for this visit. PHYSICAL EXAMINATION:  Visit Vitals  Pulse 67   Temp 98 °F (36.7 °C) (Temporal)   Ht 5' 2\" (1.575 m)   Wt 203 lb (92.1 kg)   SpO2 95%   BMI 37.13 kg/m²    Appearance: Alert, well appearing and pleasant patient who is in no distress, oriented to person, place/time, and who follows commands. HEENT: Marliss Solar Filippo II hears well, does not require hearing aids. His sclera of the eyes are non-icteric. He is breathing normally and no respiratory accessory muscle use is noted. No JVD present and Neck ROM within normal limits. Psychiatric: Affect and mood are appropriate. Oriented x3  Cardiovascular/Peripheral Vascular: Normal pulses to each foot. Integumentary: No rashes. Warm and normal color. No drainage.    Gait: slight limp  Sensory Exam: Intact/Normal Sensation    Lymphatic: No evidence of Lymphedema  Vascular:       Pulses: palpable  Varicosities none  Wounds/Abrasion: None Present  Neuro: Negative, no tremors  ORTHO EXAMINATION:  Examination Right knee Left knee   Skin Intact Intact   Range of motion 120-0 100-5   Effusion - -   Medial joint line tenderness + +   Lateral joint line tenderness - -   Popliteal tenderness - -   Osteophytes palpable - -   Missaels - -   Patella crepitus - -   Anterior drawer - -   Lateral laxity - -   Medial laxity - -   Varus deformity - -   Valgus deformity - -   Pretibial edema - ++   Calf tenderness - -     TIME OUT:  Chart reviewed for the following:   Ephraim Johnson MD, have reviewed the History, Physical and updated the Allergic reactions for Gerold Shires II   TIME OUT performed immediately prior to start of procedure:  Ephraim Johnson MD, have performed the following reviews on Gerold Shires II prior to the start of the procedure:          * Patient was identified by name and date of birth   * Agreement on procedure being performed was verified  * Risks and Benefits explained to the patient  * Procedure site verified and marked as necessary  * Patient was positioned for comfort  * Consent was obtained     Time: 2:30 PM     Date of procedure: 9/30/2022  Procedure performed by:  Clay Knight MD  Mr. Tobi Mantilla tolerated the procedure well with no complications. RADIOGRAPHS:  XR KNEES 9/30/22 BROOKE  IMPRESSION:  Three views with bilateral knees on AP view - No fractures,  effusion, severe medial R and moderately severe medial L joint space narrowing,  osteophytes present. Kellgren Mukesh grade 4/3. IMPRESSION:      ICD-10-CM ICD-9-CM    1. Primary osteoarthritis of left knee  M17.12 715.16 betamethasone (CELESTONE) injection 3 mg      DRAIN/INJECT LARGE JOINT/BURSA      2.  Acute pain of left knee  M25.562 719.46 AMB POC X-RAY KNEE 3 VIEW      betamethasone (CELESTONE) injection 3 mg      DRAIN/INJECT LARGE JOINT/BURSA        PLAN:  After discussing treatment options, patient's left knee was injected with 4 cc Marcaine and 1/2 cc Celestone. There is no need for surgery at this time. He will follow up as needed.       Scribed by El Goncalves (Duran Orellana) as dictated by Den Zamora MD

## 2022-10-04 ENCOUNTER — TELEPHONE (OUTPATIENT)
Dept: ORTHOPEDIC SURGERY | Age: 52
End: 2022-10-04

## 2022-10-04 NOTE — TELEPHONE ENCOUNTER
Unable to leave message, voicemail kept looping stating \"nothing has been recorded, please leave message after the tone. \" Writer ended call. Per GEOVANI James \"Please advise patient that reevaluation is necessary no pain medication can be sent to the pharmacy till patient seen in office again. Recommend over-the-counter Tylenol/Motrin or similar analgesic/anti-inflammatory medication per 's recommended dosing. \"

## 2022-10-04 NOTE — TELEPHONE ENCOUNTER
Unable to speak to patient, called patient wife. She said that the boil is no longer there and he's completed his antibiotics. Still requesting pain medicine.  Explained situation and concern and advised if it's new and intense and swelling pain to please present to the ER as, this is the office's recommendation

## 2022-10-04 NOTE — TELEPHONE ENCOUNTER
Patients wife, Richard Gu, called on behalf of patient to advise that the pain in his knee is returning. He's been at work, doing a lot of walking and climbing stairs. He's asking if he will be eligible for a prescription or if he needs to return to office for another visit. Confirmed pharmacy on file is correct. Please advise Richard Gu at [phone].

## 2022-11-14 ENCOUNTER — VIRTUAL VISIT (OUTPATIENT)
Dept: FAMILY MEDICINE CLINIC | Age: 52
End: 2022-11-14
Payer: COMMERCIAL

## 2022-11-14 DIAGNOSIS — J11.1 INFLUENZA: Primary | ICD-10-CM

## 2022-11-14 PROCEDURE — 99213 OFFICE O/P EST LOW 20 MIN: CPT | Performed by: FAMILY MEDICINE

## 2022-11-14 RX ORDER — OSELTAMIVIR PHOSPHATE 75 MG/1
75 CAPSULE ORAL 2 TIMES DAILY
Qty: 10 CAPSULE | Refills: 0 | Status: SHIPPED | OUTPATIENT
Start: 2022-11-14 | End: 2022-11-19

## 2022-11-14 RX ORDER — HYDROCODONE POLISTIREX AND CHLORPHENIRAMINE POLISTIREX 10; 8 MG/5ML; MG/5ML
5 SUSPENSION, EXTENDED RELEASE ORAL
Qty: 115 ML | Refills: 0 | Status: SHIPPED | OUTPATIENT
Start: 2022-11-14 | End: 2022-11-17

## 2022-11-14 NOTE — PROGRESS NOTES
Arabella Martel II is a 45 y.o. male who was seen by synchronous (real-time) audio-video technology on 11/14/2022 for No chief complaint on file. Assessment & Plan:   Diagnoses and all orders for this visit:    1. Influenza  -     oseltamivir (TAMIFLU) 75 mg capsule; Take 1 Capsule by mouth two (2) times a day for 5 days.  -     HYDROcodone-chlorpheniramine (TUSSIONEX) 10-8 mg/5 mL suspension; Take 5 mL by mouth every twelve (12) hours as needed for Cough for up to 3 days. Max Daily Amount: 10 mL. 712  Subjective:       Prior to Admission medications    Medication Sig Start Date End Date Taking? Authorizing Provider   trimethoprim-sulfamethoxazole (BACTRIM DS, SEPTRA DS) 160-800 mg per tablet TAKE 1 TABLET BY MOUTH TWICE A DAY FOR 10 DAYS  Patient not taking: Reported on 9/30/2022 9/22/22   Piter Mariano MD   sildenafil citrate (Viagra) 50 mg tablet Take 1 Tablet by mouth daily as needed for Erectile Dysfunction. Patient not taking: Reported on 9/30/2022 6/9/22   Piter Mariano MD   amLODIPine-benazepril (LOTREL) 5-20 mg per capsule TAKE 1 CAPSULE BY MOUTH EVERY DAY 6/7/22   Piter Mariano MD   diphenoxylate-atropine (LOMOTIL) 2.5-0.025 mg/5 mL liquid Take 5 mL by mouth four (4) times daily as needed for Diarrhea. Max Daily Amount: 20 mL. Patient not taking: Reported on 9/30/2022 12/1/21   Piter Mariano MD   esomeprazole (NEXIUM) 20 mg capsule Take  by mouth daily. Provider, Historical   acetaminophen (TYLENOL PO) Take  by mouth. Patient not taking: Reported on 9/30/2022    Provider, Historical   topiramate ER (TROKENDI XR) 25 mg capsule 1 tab po qhs for 1 week then increase to 2 tab po qhs thereafter. Patient not taking: Reported on 9/30/2022 1/24/19   Chris Girard MD   diphenhydrAMINE (BENADRYL) 25 mg tablet Take 25 mg by mouth every six (6) hours as needed for Sleep.   Patient not taking: Reported on 9/30/2022    Provider, Historical   cyanocobalamin 1,000 mcg tablet Take 1,000 mcg by mouth daily. Patient not taking: Reported on 9/30/2022    Provider, Historical   pyridoxine, vitamin B6, (VITAMIN B-6) 100 mg tablet Take 100 mg by mouth daily. Provider, Historical   glucosamine/chondr benz A sod (OSTEO BI-FLEX PO) Take  by mouth. Provider, Historical   multivitamin (ONE A DAY) tablet Take 1 Tab by mouth daily. Provider, Historical     Patient Active Problem List   Diagnosis Code    Severe obesity (BMI 35.0-39. 9) E66.01    Left lumbar radiculitis M54.16    DDD (degenerative disc disease), lumbar M51.36    Chronic cluster headache, not intractable G44.029    Hypertension, uncontrolled I10     Past Medical History:   Diagnosis Date    Arthritis     bilat knees    GERD (gastroesophageal reflux disease)     Neck injury about 2007    hit head on pipe, in [place], tx chiro, told he had HNP       Review of Systems   Constitutional:  Negative for chills, fever, malaise/fatigue and weight loss. HENT:  Positive for congestion. Eyes:  Negative for blurred vision. Respiratory:  Positive for cough. Negative for shortness of breath and wheezing. Cardiovascular:  Negative for chest pain. Gastrointestinal:  Negative for nausea and vomiting. Musculoskeletal:  Negative for myalgias. Skin:  Negative for rash. Neurological:  Negative for weakness. Objective:   No flowsheet data found. General: alert, cooperative, no distress   Mental  status: normal mood, behavior, speech, dress, motor activity, and thought processes, able to follow commands   HENT: NCAT   Neck: no visualized mass   Resp: no respiratory distress   Neuro: no gross deficits   Skin: no discoloration or lesions of concern on visible areas   Psychiatric: normal affect, consistent with stated mood, no evidence of hallucinations     Additional exam findings: We discussed the expected course, resolution and complications of the diagnosis(es) in detail.   Medication risks, benefits, costs, interactions, and alternatives were discussed as indicated. I advised him to contact the office if his condition worsens, changes or fails to improve as anticipated. He expressed understanding with the diagnosis(es) and plan. Surya Barkley II, was evaluated through a synchronous (real-time) audio-video encounter. The patient (or guardian if applicable) is aware that this is a billable service, which includes applicable co-pays. This Virtual Visit was conducted with patient's (and/or legal guardian's) consent. The visit was conducted pursuant to the emergency declaration under the [address] [place] [address], [address] [place] [address] authority and the [employer] General Act. Patient identification was verified, and a caregiver was present when appropriate. The patient was located at: Home: [place] [phone]  The provider was located at:  Facility (Appt Department): [address] [place] [address]  [address]  [phone]        Klever Richardson MD

## 2022-11-21 ENCOUNTER — TELEPHONE (OUTPATIENT)
Dept: FAMILY MEDICINE CLINIC | Age: 52
End: 2022-11-21

## 2022-11-21 NOTE — TELEPHONE ENCOUNTER
Patient's wife called because he finished med Friday morning, the pills he finished but states he does still have some cough syrup. He is still coughing up yellow mucus, nauseated, and coughing and head congested. Wondering what Dr. Wilton Blue would recommend and/or if there is something else he can prescribe to help him finish getting over it.

## 2022-11-25 DIAGNOSIS — J11.1 INFLUENZA: Primary | ICD-10-CM

## 2022-11-25 RX ORDER — AZITHROMYCIN 250 MG/1
TABLET, FILM COATED ORAL
Qty: 6 TABLET | Refills: 0 | Status: SHIPPED | OUTPATIENT
Start: 2022-11-25 | End: 2022-11-30

## 2022-11-25 NOTE — TELEPHONE ENCOUNTER
Called to let pt know that zpack has been sent in by dr Latisha Arteaga.  Left message for patient at home number requesting return call.  ]

## 2022-11-30 ENCOUNTER — OFFICE VISIT (OUTPATIENT)
Dept: ORTHOPEDIC SURGERY | Age: 52
End: 2022-11-30
Payer: COMMERCIAL

## 2022-11-30 VITALS — RESPIRATION RATE: 16 BRPM | BODY MASS INDEX: 37.17 KG/M2 | TEMPERATURE: 97.8 F | WEIGHT: 202 LBS | HEIGHT: 62 IN

## 2022-11-30 DIAGNOSIS — M17.11 PRIMARY OSTEOARTHRITIS OF RIGHT KNEE: ICD-10-CM

## 2022-11-30 DIAGNOSIS — M17.12 PRIMARY OSTEOARTHRITIS OF LEFT KNEE: Primary | ICD-10-CM

## 2022-11-30 RX ORDER — BETAMETHASONE SODIUM PHOSPHATE AND BETAMETHASONE ACETATE 3; 3 MG/ML; MG/ML
6 INJECTION, SUSPENSION INTRA-ARTICULAR; INTRALESIONAL; INTRAMUSCULAR; SOFT TISSUE ONCE
Status: DISCONTINUED | OUTPATIENT
Start: 2022-11-30 | End: 2022-11-30

## 2022-11-30 RX ORDER — BETAMETHASONE SODIUM PHOSPHATE AND BETAMETHASONE ACETATE 3; 3 MG/ML; MG/ML
6 INJECTION, SUSPENSION INTRA-ARTICULAR; INTRALESIONAL; INTRAMUSCULAR; SOFT TISSUE ONCE
Status: COMPLETED | OUTPATIENT
Start: 2022-11-30 | End: 2022-11-30

## 2022-11-30 RX ADMIN — BETAMETHASONE SODIUM PHOSPHATE AND BETAMETHASONE ACETATE 6 MG: 3; 3 INJECTION, SUSPENSION INTRA-ARTICULAR; INTRALESIONAL; INTRAMUSCULAR; SOFT TISSUE at 11:48

## 2022-11-30 NOTE — PROGRESS NOTES
Patient: Nella Padilla                MRN: [id number]       SSN: [id number]  YOB: 1970        AGE: 45 y.o. SEX: male    PCP: Yue Brian MD  11/30/22    Chief Complaint   Patient presents with    Knee Pain     BEN     HISTORY:  Kristian Guevara II is a 45 y.o. male who is seen for increased bilateral knee pain. He describes sharp pain in the left knee and dull, aching pain in the right knee. He felt a sudden pain when he jammed his left knee while shoveling dirt in his yard for his wife a couple of weeks ago. He has been experiencing bilateral knee pain for the past couple of months but mostly left knee pain recently. He feels left knee pain with standing, walking and stair climbing. He experiences  startup pain after sitting. He has been taking Tylenol and ibuprofen as needed. 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 He lives in [place] with his wife, 2 yo son, and 10 yo daughter. He also has a 22 yo daughter from a previous marriage. Mr. Howie Stewart weighs 203 lbs and is 5'2\" tall. He has four pasture horses and ten cows. His wife and son accompany him to today's office visit.     No results found for: HBA1C, DZT5XWHJ, VWA4VVSF, XZA5EWXT  Weight Metrics 11/30/2022 9/30/2022 9/21/2022 3/10/2022 8/31/2021 1/10/2020 11/8/2019   Weight 202 lb 203 lb 201 lb 200 lb 186 lb 203 lb 205 lb   BMI 36.95 kg/m2 37.13 kg/m2 36.76 kg/m2 36.58 kg/m2 34.02 kg/m2 37.13 kg/m2 37.49 kg/m2       Patient Active Problem List   Diagnosis Code    Severe obesity (BMI 35.0-39. 9) E66.01    Left lumbar radiculitis M54.16    DDD (degenerative disc disease), lumbar M51.36    Chronic cluster headache, not intractable G44.029    Hypertension, uncontrolled I10     REVIEW OF SYSTEMS:    Constitutional Symptoms: Negative   Eyes: Negative   Ears, Nose, Throat and Mouth: Negative   Cardiovascular: Negative   Respiratory: Negative   Genitourinary: Per HPI   Gastrointestinal: Per HPI   Integumentary (Skin and/or Breast): Negative   Musculoskeletal: Per HPI   Endocrine/Rheumatologic: Negative   Neurological: Per HPI   Hematology/Lymphatic: Negative    Allergic/Immunologic: Negative   Phychiatric: Negative    Social History     Socioeconomic History    Marital status:      Spouse name: Not on file    Number of children: Not on file    Years of education: Not on file    Highest education level: Not on file   Occupational History    Not on file   Tobacco Use    Smoking status: Never    Smokeless tobacco: Never   Substance and Sexual Activity    Alcohol use: No    Drug use: No    Sexual activity: Not on file   Other Topics Concern    Not on file   Social History Narrative    Not on file     Social Determinants of Health     Financial Resource Strain: Not on file   Food Insecurity: Not on file   Transportation Needs: Not on file   Physical Activity: Not on file   Stress: Not on file   Social Connections: Not on file   Intimate Partner Violence: Not on file   Housing Stability: Not on file      No Known Allergies   Current Outpatient Medications   Medication Sig    azithromycin (ZITHROMAX) 250 mg tablet Take 2 tablets today, then take 1 tablet daily    amLODIPine-benazepril (LOTREL) 5-20 mg per capsule TAKE 1 CAPSULE BY MOUTH EVERY DAY    esomeprazole (NEXIUM) 20 mg capsule Take  by mouth daily. glucosamine/chondr benz A sod (OSTEO BI-FLEX PO) Take  by mouth.    multivitamin (ONE A DAY) tablet Take 1 Tab by mouth daily. trimethoprim-sulfamethoxazole (BACTRIM DS, SEPTRA DS) 160-800 mg per tablet TAKE 1 TABLET BY MOUTH TWICE A DAY FOR 10 DAYS (Patient not taking: No sig reported)    sildenafil citrate (Viagra) 50 mg tablet Take 1 Tablet by mouth daily as needed for Erectile Dysfunction. (Patient not taking: No sig reported)    diphenoxylate-atropine (LOMOTIL) 2.5-0.025 mg/5 mL liquid Take 5 mL by mouth four (4) times daily as needed for Diarrhea. Max Daily Amount: 20 mL. (Patient not taking: No sig reported)    acetaminophen (TYLENOL PO) Take  by mouth. (Patient not taking: No sig reported)    topiramate ER (TROKENDI XR) 25 mg capsule 1 tab po qhs for 1 week then increase to 2 tab po qhs thereafter. (Patient not taking: No sig reported)    diphenhydrAMINE (BENADRYL) 25 mg tablet Take 25 mg by mouth every six (6) hours as needed for Sleep. (Patient not taking: No sig reported)    cyanocobalamin 1,000 mcg tablet Take 1,000 mcg by mouth daily. (Patient not taking: No sig reported)    pyridoxine, vitamin B6, (VITAMIN B-6) 100 mg tablet Take 100 mg by mouth daily.  (Patient not taking: Reported on 11/30/2022)     Current Facility-Administered Medications   Medication Dose Route Frequency    betamethasone (CELESTONE) injection 6 mg  6 mg Intra artICUlar ONCE      PHYSICAL EXAMINATION:  Visit Vitals  Temp 97.8 °F (36.6 °C) (Temporal)   Resp 16   Ht 5' 2\" (1.575 m)   Wt 202 lb (91.6 kg)   BMI 36.95 kg/m²      ORTHO EXAMINATION:  Examination Right knee Left knee   Skin Intact Intact   Range of motion 110-0 100-5   Effusion - -   Medial joint line tenderness + +   Lateral joint line tenderness - -   Popliteal tenderness - -   Osteophytes palpable + +   Missaels - -   Patella crepitus + +   Anterior drawer - -   Lateral laxity - -   Medial laxity - -   Varus deformity - -   Valgus deformity - -   Pretibial edema - ++   Calf tenderness - -        TIME OUT:  Chart reviewed for the following:   I, Paco Armendariz MD, have reviewed the History, Physical and updated the Allergic reactions for Arzella Lebanon II   TIME OUT performed immediately prior to start of procedure:  Radha Jorgensen MD, have performed the following reviews on Arzella Lebanon II prior to the start of the procedure:          * Patient was identified by name and date of birth   * Agreement on procedure being performed was verified  * Risks and Benefits explained to the patient  * Procedure site verified and marked as necessary  * Patient was positioned for comfort  * Consent was obtained     Time: 11:35 AM     Date of procedure: 11/30/2022  Procedure performed by:  Paco Armendariz MD  Mr. Lynnette Gutierrez tolerated the procedure well with no complications. RADIOGRAPHS:  XR KNEES 9/30/22 BROOKE  IMPRESSION:  Three views with bilateral knees on AP view - No fractures,  effusion, severe medial R and moderately severe medial L joint space narrowing,  osteophytes present. Kellgren Mukesh grade 4/3. IMPRESSION:      ICD-10-CM ICD-9-CM    1. Primary osteoarthritis of left knee  M17.12 715.16 PROCEDURE AUTHORIZATION TO       betamethasone (CELESTONE) injection 6 mg      DRAIN/INJECT LARGE JOINT/BURSA      DISCONTINUED: betamethasone (CELESTONE) injection 6 mg      CANCELED: DRAIN/INJECT LARGE JOINT/BURSA      2. Primary osteoarthritis of right knee  M17.11 715.16 PROCEDURE AUTHORIZATION TO       betamethasone (CELESTONE) injection 6 mg      DRAIN/INJECT LARGE JOINT/BURSA      DISCONTINUED: betamethasone (CELESTONE) injection 6 mg      CANCELED: DRAIN/INJECT LARGE JOINT/BURSA        PLAN:  After discussing treatment options, patient's knees were each injected with 4 cc Marcaine and 1/2 cc Celestone. Consider visco supplementation if pain continues.  We discussed possible need for knee arthroplasty at some time in the future if pain continues. OTC analgesics for pain. He will follow up as needed.       Scribed by Elsie Aguirre (Dickenson Dull) as dictated by Birdie Batista MD

## 2022-12-03 DIAGNOSIS — I10 HYPERTENSION, UNCONTROLLED: ICD-10-CM

## 2022-12-07 RX ORDER — AMLODIPINE AND BENAZEPRIL HYDROCHLORIDE 5; 20 MG/1; MG/1
CAPSULE ORAL
Qty: 90 CAPSULE | Refills: 1 | Status: SHIPPED | OUTPATIENT
Start: 2022-12-07

## 2022-12-08 DIAGNOSIS — L02.91 ABSCESS: ICD-10-CM

## 2022-12-08 DIAGNOSIS — L03.90 CELLULITIS, UNSPECIFIED CELLULITIS SITE: ICD-10-CM

## 2022-12-08 RX ORDER — SULFAMETHOXAZOLE AND TRIMETHOPRIM 800; 160 MG/1; MG/1
TABLET ORAL
Qty: 20 TABLET | Refills: 0 | Status: SHIPPED | OUTPATIENT
Start: 2022-12-08

## 2022-12-08 NOTE — TELEPHONE ENCOUNTER
This patient contacted office for the following prescriptions to be filled:    Last office visit: 11/14/22  Follow up appointment: 12/12/22 (if overdue call patient to schedule appointment)  Medication requested :   Requested Prescriptions     Pending Prescriptions Disp Refills    trimethoprim-sulfamethoxazole (BACTRIM DS, SEPTRA DS) 160-800 mg per tablet 20 Tablet 0      PCP: Jose Currie   Mail order or Local pharmacy [facility]      Please review and advise as soon as possible.

## 2022-12-15 ENCOUNTER — OFFICE VISIT (OUTPATIENT)
Dept: ORTHOPEDIC SURGERY | Age: 52
End: 2022-12-15
Payer: COMMERCIAL

## 2022-12-15 VITALS — BODY MASS INDEX: 37.36 KG/M2 | TEMPERATURE: 97.7 F | WEIGHT: 203 LBS | HEIGHT: 62 IN | RESPIRATION RATE: 16 BRPM

## 2022-12-15 DIAGNOSIS — M17.12 PRIMARY OSTEOARTHRITIS OF LEFT KNEE: Primary | ICD-10-CM

## 2022-12-15 DIAGNOSIS — M17.11 PRIMARY OSTEOARTHRITIS OF RIGHT KNEE: ICD-10-CM

## 2022-12-15 RX ORDER — BETAMETHASONE SODIUM PHOSPHATE AND BETAMETHASONE ACETATE 3; 3 MG/ML; MG/ML
6 INJECTION, SUSPENSION INTRA-ARTICULAR; INTRALESIONAL; INTRAMUSCULAR; SOFT TISSUE ONCE
Status: COMPLETED | OUTPATIENT
Start: 2022-12-15 | End: 2022-12-15

## 2022-12-15 RX ADMIN — BETAMETHASONE SODIUM PHOSPHATE AND BETAMETHASONE ACETATE 6 MG: 3; 3 INJECTION, SUSPENSION INTRA-ARTICULAR; INTRALESIONAL; INTRAMUSCULAR; SOFT TISSUE at 16:17

## 2022-12-15 NOTE — PROGRESS NOTES
Patient: Tova Oakley                MRN: [id number]       SSN: [id number]  YOB: 1970        AGE: 45 y.o. SEX: male    PCP: Colette Jefferson MD  12/15/22    Chief Complaint   Patient presents with    Knee Pain     [place]      HISTORY:  Shannan Smith II is a 45 y.o. male who is seen for increased bilateral knee pain, R>L. He responded to his injection last ov but his pains have returned. He has been experiencing bilateral knee pain for the past several years. He felt a sudden pain when he jammed his left knee while shoveling dirt in his yard for his wife about a month ago. He states his workload has recently increased and he had to carry 50 lb bags of sand a few days ago. He feels pain with standing, walking and stair climbing. He experiences  startup pain after sitting. 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 He lives in [place] with his wife, 2 yo son, and 10 yo daughter. He also has a 22 yo daughter from a previous marriage. Mr. Cruz Tay weighs 202 lbs and is 5'2\" tall. He has lost 6 lb since his last office visit. He has four pasture horses and ten cows.       No results found for: HBA1C, PMS5ZKUT, ANV0VYUP, PUP1VSCJ  Weight Metrics 12/15/2022 11/30/2022 9/30/2022 9/21/2022 3/10/2022 8/31/2021 1/10/2020   Weight 203 lb 202 lb 203 lb 201 lb 200 lb 186 lb 203 lb   BMI 37.13 kg/m2 36.95 kg/m2 37.13 kg/m2 36.76 kg/m2 36.58 kg/m2 34.02 kg/m2 37.13 kg/m2       Patient Active Problem List   Diagnosis Code    Severe obesity (BMI 35.0-39. 9) E66.01    Left lumbar radiculitis M54.16    DDD (degenerative disc disease), lumbar M51.36    Chronic cluster headache, not intractable G44.029    Hypertension, uncontrolled I10     REVIEW OF SYSTEMS:    Constitutional Symptoms: Negative   Eyes: Negative   Ears, Nose, Throat and Mouth: Negative   Cardiovascular: Negative   Respiratory: Negative   Genitourinary: Per HPI   Gastrointestinal: Per HPI   Integumentary (Skin and/or Breast): Negative   Musculoskeletal: Per HPI   Endocrine/Rheumatologic: Negative   Neurological: Per HPI   Hematology/Lymphatic: Negative    Allergic/Immunologic: Negative   Phychiatric: Negative    Social History     Socioeconomic History    Marital status:      Spouse name: Not on file    Number of children: Not on file    Years of education: Not on file    Highest education level: Not on file   Occupational History    Not on file   Tobacco Use    Smoking status: Never    Smokeless tobacco: Never   Substance and Sexual Activity    Alcohol use: No    Drug use: No    Sexual activity: Not on file   Other Topics Concern    Not on file   Social History Narrative    Not on file     Social Determinants of Health     Financial Resource Strain: Not on file   Food Insecurity: Not on file   Transportation Needs: Not on file   Physical Activity: Not on file   Stress: Not on file   Social Connections: Not on file   Intimate Partner Violence: Not on file   Housing Stability: Not on file      No Known Allergies   Current Outpatient Medications   Medication Sig    trimethoprim-sulfamethoxazole (BACTRIM DS, SEPTRA DS) 160-800 mg per tablet TAKE 1 TABLET BY MOUTH TWICE A DAY FOR 10 DAYS    amLODIPine-benazepril (LOTREL) 5-20 mg per capsule TAKE 1 CAPSULE BY MOUTH EVERY DAY    sildenafil citrate (Viagra) 50 mg tablet Take 1 Tablet by mouth daily as needed for Erectile Dysfunction. (Patient not taking: No sig reported)    diphenoxylate-atropine (LOMOTIL) 2.5-0.025 mg/5 mL liquid Take 5 mL by mouth four (4) times daily as needed for Diarrhea. Max Daily Amount: 20 mL. (Patient not taking: No sig reported)    esomeprazole (NEXIUM) 20 mg capsule Take  by mouth daily. acetaminophen (TYLENOL PO) Take  by mouth. (Patient not taking: No sig reported)    topiramate ER (TROKENDI XR) 25 mg capsule 1 tab po qhs for 1 week then increase to 2 tab po qhs thereafter. (Patient not taking: No sig reported)    diphenhydrAMINE (BENADRYL) 25 mg tablet Take 25 mg by mouth every six (6) hours as needed for Sleep. (Patient not taking: No sig reported)    cyanocobalamin 1,000 mcg tablet Take 1,000 mcg by mouth daily. (Patient not taking: No sig reported)    pyridoxine, vitamin B6, (VITAMIN B-6) 100 mg tablet Take 100 mg by mouth daily. (Patient not taking: Reported on 11/30/2022)    glucosamine/chondr benz A sod (OSTEO BI-FLEX PO) Take  by mouth.    multivitamin (ONE A DAY) tablet Take 1 Tab by mouth daily. No current facility-administered medications for this visit.       PHYSICAL EXAMINATION:  Visit Vitals  Temp 97.7 °F (36.5 °C) (Temporal)   Resp 16   Ht 5' 2\" (1.575 m)   Wt 203 lb (92.1 kg)   BMI 37.13 kg/m²      ORTHO EXAMINATION:  Examination Right knee Left knee   Skin Intact Intact   Range of motion 110-0 100-5   Effusion - -   Medial joint line tenderness + +   Lateral joint line tenderness - -   Popliteal tenderness - -   Osteophytes palpable + +   Missaels - -   Patella crepitus + +   Anterior drawer - -   Lateral laxity - -   Medial laxity - -   Varus deformity - -   Valgus deformity - -   Pretibial edema - ++   Calf tenderness - -       TIME OUT:  Chart reviewed for the following:   I, Ashley Whelan MD, have reviewed the History, Physical and updated the Allergic reactions for Moira Pink II   TIME OUT performed immediately prior to start of procedure:  Madsion Gomez MD, have performed the following reviews on Carpenter's II prior to the start of the procedure:          * Patient was identified by name and date of birth   * Agreement on procedure being performed was verified  * Risks and Benefits explained to the patient  * Procedure site verified and marked as necessary  * Patient was positioned for comfort  * Consent was obtained     Time: 4:09 PM     Date of procedure: 12/15/2022  Procedure performed by:  Magalis Peters MD  Mr. Carson Gonzalez tolerated the procedure well with no complications. RADIOGRAPHS:  XR KNEES 9/30/22 [facility]  IMPRESSION:  Three views with bilateral knees on AP view - No fractures,  effusion, severe medial R and moderately severe medial L joint space narrowing,  osteophytes present. Kellgren Mukesh grade 4/3. IMPRESSION:      ICD-10-CM ICD-9-CM    1. Primary osteoarthritis of left knee  M17.12 715.16       2. Primary osteoarthritis of right knee  M17.11 715.16         PLAN:  After discussing treatment options, patient's knees were each was injected with 4 cc Xylocaine and 1/2 cc Celestone. Consider visco supplementation if pain continues. He will follow up as needed.       Scribed by Kathia West ([address]) as dictated by Magalis Peters MD

## 2023-01-06 ENCOUNTER — OFFICE VISIT (OUTPATIENT)
Dept: ORTHOPEDIC SURGERY | Age: 53
End: 2023-01-06
Payer: COMMERCIAL

## 2023-01-06 VITALS — TEMPERATURE: 97.8 F | WEIGHT: 203.6 LBS | HEART RATE: 77 BPM | BODY MASS INDEX: 37.24 KG/M2 | OXYGEN SATURATION: 99 %

## 2023-01-06 DIAGNOSIS — M25.461 EFFUSION, RIGHT KNEE: ICD-10-CM

## 2023-01-06 DIAGNOSIS — M17.12 PRIMARY OSTEOARTHRITIS OF LEFT KNEE: Primary | ICD-10-CM

## 2023-01-06 DIAGNOSIS — M17.11 PRIMARY OSTEOARTHRITIS OF RIGHT KNEE: ICD-10-CM

## 2023-01-06 RX ORDER — BETAMETHASONE SODIUM PHOSPHATE AND BETAMETHASONE ACETATE 3; 3 MG/ML; MG/ML
6 INJECTION, SUSPENSION INTRA-ARTICULAR; INTRALESIONAL; INTRAMUSCULAR; SOFT TISSUE ONCE
Status: COMPLETED | OUTPATIENT
Start: 2023-01-06 | End: 2023-01-06

## 2023-01-06 RX ADMIN — BETAMETHASONE SODIUM PHOSPHATE AND BETAMETHASONE ACETATE 6 MG: 3; 3 INJECTION, SUSPENSION INTRA-ARTICULAR; INTRALESIONAL; INTRAMUSCULAR; SOFT TISSUE at 11:26

## 2023-01-06 NOTE — PROGRESS NOTES
Patient: Jose Ford                MRN: [id number]       SSN: [id number]  YOB: 1970        AGE: 45 y.o. SEX: male    PCP: Yunior Weiss MD  01/06/23    Chief Complaint   Patient presents with    Knee Pain     HISTORY:  Teetee Kirkland II is a 45 y.o. male who is seen for bilateral knee pain and right knee swelling. He responded to bilateral knee steroid injections last ov but his pains have returned. He has been experiencing bilateral knee pain for the past several years. He reports right popliteal stiffness. He feels pain with standing, walking and stair climbing. He experiences startup pain after sitting. 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 He lives in [place] with his wife, 2 yo son, and 10 yo daughter. He also has a 22 yo daughter from a previous marriage. Mr. Yanelis Brasher weighs 202 lbs and is 5'2\" tall. He has lost 6 lb since his last office visit. He has four pasture horses and ten cows.     No results found for: HBA1C, EQJ4VCKW, CCW7GGYF, XQF4VDZZ  Weight Metrics 1/6/2023 12/15/2022 11/30/2022 9/30/2022 9/21/2022 3/10/2022 8/31/2021   Weight 203 lb 9.6 oz 203 lb 202 lb 203 lb 201 lb 200 lb 186 lb   BMI 37.24 kg/m2 37.13 kg/m2 36.95 kg/m2 37.13 kg/m2 36.76 kg/m2 36.58 kg/m2 34.02 kg/m2       Patient Active Problem List Diagnosis Code    Severe obesity (BMI 35.0-39. 9) E66.01    Left lumbar radiculitis M54.16    DDD (degenerative disc disease), lumbar M51.36    Chronic cluster headache, not intractable G44.029    Hypertension, uncontrolled I10     REVIEW OF SYSTEMS:    Constitutional Symptoms: Negative   Eyes: Negative   Ears, Nose, Throat and Mouth: Negative   Cardiovascular: Negative   Respiratory: Negative   Genitourinary: Per HPI   Gastrointestinal: Per HPI   Integumentary (Skin and/or Breast): Negative   Musculoskeletal: Per HPI   Endocrine/Rheumatologic: Negative   Neurological: Per HPI   Hematology/Lymphatic: Negative    Allergic/Immunologic: Negative   Phychiatric: Negative    Social History     Socioeconomic History    Marital status:      Spouse name: Not on file    Number of children: Not on file    Years of education: Not on file    Highest education level: Not on file   Occupational History    Not on file   Tobacco Use    Smoking status: Never    Smokeless tobacco: Never   Substance and Sexual Activity    Alcohol use: No    Drug use: No    Sexual activity: Not on file   Other Topics Concern    Not on file   Social History Narrative    Not on file     Social Determinants of Health     Financial Resource Strain: Not on file   Food Insecurity: Not on file   Transportation Needs: Not on file   Physical Activity: Not on file   Stress: Not on file   Social Connections: Not on file   Intimate Partner Violence: Not on file   Housing Stability: Not on file      No Known Allergies   Current Outpatient Medications   Medication Sig    trimethoprim-sulfamethoxazole (BACTRIM DS, SEPTRA DS) 160-800 mg per tablet TAKE 1 TABLET BY MOUTH TWICE A DAY FOR 10 DAYS    amLODIPine-benazepril (LOTREL) 5-20 mg per capsule TAKE 1 CAPSULE BY MOUTH EVERY DAY    sildenafil citrate (Viagra) 50 mg tablet Take 1 Tablet by mouth daily as needed for Erectile Dysfunction.  (Patient not taking: No sig reported)    diphenoxylate-atropine (LOMOTIL) 2.5-0.025 mg/5 mL liquid Take 5 mL by mouth four (4) times daily as needed for Diarrhea. Max Daily Amount: 20 mL. (Patient not taking: No sig reported)    esomeprazole (NEXIUM) 20 mg capsule Take  by mouth daily. acetaminophen (TYLENOL PO) Take  by mouth. (Patient not taking: No sig reported)    topiramate ER (TROKENDI XR) 25 mg capsule 1 tab po qhs for 1 week then increase to 2 tab po qhs thereafter. (Patient not taking: No sig reported)    diphenhydrAMINE (BENADRYL) 25 mg tablet Take 25 mg by mouth every six (6) hours as needed for Sleep. (Patient not taking: No sig reported)    cyanocobalamin 1,000 mcg tablet Take 1,000 mcg by mouth daily. (Patient not taking: No sig reported)    pyridoxine, vitamin B6, (VITAMIN B-6) 100 mg tablet Take 100 mg by mouth daily. (Patient not taking: Reported on 11/30/2022)    glucosamine/chondr benz A sod (OSTEO BI-FLEX PO) Take  by mouth.    multivitamin (ONE A DAY) tablet Take 1 Tab by mouth daily.      Current Facility-Administered Medications   Medication Dose Route Frequency    betamethasone (CELESTONE) injection 6 mg  6 mg Intra artICUlar ONCE      PHYSICAL EXAMINATION:  Visit Vitals  Pulse 77   Temp 97.8 °F (36.6 °C) (Temporal)   Wt 203 lb 9.6 oz (92.4 kg)   SpO2 99%   BMI 37.24 kg/m²      ORTHO EXAMINATION:  Examination Right knee Left knee   Skin Intact Intact   Range of motion 110-0 100-5   Effusion ++ -   Medial joint line tenderness + +   Lateral joint line tenderness - -   Popliteal tenderness - -   Osteophytes palpable + +   Missaels - -   Patella crepitus + +   Anterior drawer - -   Lateral laxity - -   Medial laxity - -   Varus deformity - -   Valgus deformity - -   Pretibial edema ++ ++   Calf tenderness - -       TIME OUT:  Chart reviewed for the following:   Fritz POND MD, have reviewed the History, Physical and updated the Allergic reactions for Yariela Vibha II   TIME OUT performed immediately prior to start of procedure:  Fritz POND, MD, have performed the following reviews on Carpenter's II prior to the start of the procedure:          * Patient was identified by name and date of birth   * Agreement on procedure being performed was verified  * Risks and Benefits explained to the patient  * Procedure site verified and marked as necessary  * Patient was positioned for comfort  * Consent was obtained     Time: 11:16 AM     Date of procedure: 1/6/2023  Procedure performed by:  Rey Garcia MD  Mr. Tomas Serrato tolerated the procedure well with no complications. RADIOGRAPHS:  XR KNEES 9/30/22 BROOKE  IMPRESSION:  Three views with bilateral knees on AP view - No fractures,  effusion, severe medial R and moderately severe medial L joint space narrowing,  osteophytes present. Kellgren Mukesh grade 4/3. IMPRESSION:      ICD-10-CM ICD-9-CM    1. Primary osteoarthritis of left knee  M17.12 715.16 DRAIN/INJECT LARGE JOINT/BURSA      betamethasone (CELESTONE) injection 6 mg      2. Primary osteoarthritis of right knee  M17.11 715.16 DRAIN/INJECT LARGE JOINT/BURSA      betamethasone (CELESTONE) injection 6 mg      3. Effusion, right knee  M25.461 719.06         PLAN:  After timeout and under sterile conditions, the right knee aspirated 20 cc of  clear yellow  fluid. The fluid was discarded. Patient's knees were each injected with 4 cc Sensorcaine and discarded1/2 cc Celestone. Consider visco supplementation if pain continues. He will follow up as needed.       Scribed by Satnam Davies ([address]) as dictated by Rey Garcia MD

## 2023-01-11 ENCOUNTER — DOCUMENTATION ONLY (OUTPATIENT)
Dept: ORTHOPEDIC SURGERY | Age: 53
End: 2023-01-11

## 2023-01-19 ENCOUNTER — OFFICE VISIT (OUTPATIENT)
Dept: ORTHOPEDIC SURGERY | Age: 53
End: 2023-01-19
Payer: COMMERCIAL

## 2023-01-19 DIAGNOSIS — M17.12 PRIMARY OSTEOARTHRITIS OF LEFT KNEE: Primary | ICD-10-CM

## 2023-01-19 DIAGNOSIS — M17.11 PRIMARY OSTEOARTHRITIS OF RIGHT KNEE: ICD-10-CM

## 2023-01-19 NOTE — PROGRESS NOTES
Patient: Deedee Conn                MRN: [id number]       SSN: [id number]  YOB: 1970        AGE: 45 y.o. SEX: male    PCP: Maria Victoria Llanos MD  01/19/23    No chief complaint on file. HISTORY:  Sherrie Barcenas II is a 45 y.o. male who is seen for bilateral knee pain. He responded well to a right knee aspiration at his last office visit. He has been experiencing bilateral knee pain for the past several years. He reports right popliteal stiffness. He feels pain with standing, walking and stair climbing. He experiences startup pain after sitting. 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 He mostly works at the [employer]. 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 He lives in [place] with his wife, 2 yo son, and 10 yo daughter. He also has a 22 yo daughter from a previous marriage. Mr. Rhoda Cedeño weighs 202 lbs and is 5'2\" tall. He has lost 6 lb since his last office visit. He has four pasture horses and nine cows, which his wife cares for.      No results found for: HBA1C, [id number], WRG6BQNV, GQI2MYQV  Weight Metrics 1/6/2023 12/15/2022 11/30/2022 9/30/2022 9/21/2022 3/10/2022 8/31/2021   Weight 203 lb 9.6 oz 203 lb 202 lb 203 lb 201 lb 200 lb 186 lb   BMI 37.24 kg/m2 37.13 kg/m2 36.95 kg/m2 37.13 kg/m2 36.76 kg/m2 36.58 kg/m2 34.02 kg/m2       Patient Active Problem List   Diagnosis Code    Severe obesity (BMI 35.0-39. 9) E66.01    Left lumbar radiculitis M54.16    DDD (degenerative disc disease), lumbar M51.36    Chronic cluster headache, not intractable G44.029    Hypertension, uncontrolled I10     REVIEW OF SYSTEMS:    Constitutional Symptoms: Negative   Eyes: Negative   Ears, Nose, Throat and Mouth: Negative   Cardiovascular: Negative   Respiratory: Negative   Genitourinary: Per HPI   Gastrointestinal: Per HPI   Integumentary (Skin and/or Breast): Negative   Musculoskeletal: Per HPI   Endocrine/Rheumatologic: Negative   Neurological: Per HPI   Hematology/Lymphatic: Negative    Allergic/Immunologic: Negative   Phychiatric: Negative    Social History     Socioeconomic History    Marital status:      Spouse name: Not on file    Number of children: Not on file    Years of education: Not on file    Highest education level: Not on file   Occupational History    Not on file   Tobacco Use    Smoking status: Never    Smokeless tobacco: Never   Substance and Sexual Activity    Alcohol use: No    Drug use: No    Sexual activity: Not on file   Other Topics Concern    Not on file   Social History Narrative    Not on file     Social Determinants of Health     Financial Resource Strain: Not on file   Food Insecurity: Not on file   Transportation Needs: Not on file   Physical Activity: Not on file   Stress: Not on file   Social Connections: Not on file   Intimate Partner Violence: Not on file   Housing Stability: Not on file      No Known Allergies   Current Outpatient Medications   Medication Sig    trimethoprim-sulfamethoxazole (BACTRIM DS, SEPTRA DS) 160-800 mg per tablet TAKE 1 TABLET BY MOUTH TWICE A DAY FOR 10 DAYS    amLODIPine-benazepril (LOTREL) 5-20 mg per capsule TAKE 1 CAPSULE BY MOUTH EVERY DAY    sildenafil citrate (Viagra) 50 mg tablet Take 1 Tablet by mouth daily as needed for Erectile Dysfunction. (Patient not taking: No sig reported)    diphenoxylate-atropine (LOMOTIL) 2.5-0.025 mg/5 mL liquid Take 5 mL by mouth four (4) times daily as needed for Diarrhea. Max Daily Amount: 20 mL. (Patient not taking: No sig reported)    esomeprazole (NEXIUM) 20 mg capsule Take  by mouth daily. acetaminophen (TYLENOL PO) Take  by mouth. (Patient not taking: No sig reported)    topiramate ER (TROKENDI XR) 25 mg capsule 1 tab po qhs for 1 week then increase to 2 tab po qhs thereafter. (Patient not taking: No sig reported)    diphenhydrAMINE (BENADRYL) 25 mg tablet Take 25 mg by mouth every six (6) hours as needed for Sleep. (Patient not taking: No sig reported)    cyanocobalamin 1,000 mcg tablet Take 1,000 mcg by mouth daily. (Patient not taking: No sig reported)    pyridoxine, vitamin B6, (VITAMIN B-6) 100 mg tablet Take 100 mg by mouth daily. (Patient not taking: Reported on 11/30/2022)    glucosamine/chondr benz A sod (OSTEO BI-FLEX PO) Take  by mouth.    multivitamin (ONE A DAY) tablet Take 1 Tab by mouth daily. Current Facility-Administered Medications   Medication Dose Route Frequency    sodium hyaluronate (SUPARTZ FX/EUFLEXXA/HYALGAN) 10 mg/mL injection syrg 20 mg  20 mg Intra artICUlar ONCE      PHYSICAL EXAMINATION:  There were no vitals taken for this visit.    ORTHO EXAMINATION:  Examination Right knee Left knee   Skin Intact Intact   Range of motion 110-0 100-5   Effusion ++ -   Medial joint line tenderness + +   Lateral joint line tenderness - -   Popliteal tenderness - -   Osteophytes palpable + +   Missaels - -   Patella crepitus + +   Anterior drawer - -   Lateral laxity - -   Medial laxity - -   Varus deformity - -   Valgus deformity - -   Pretibial edema ++ ++   Calf tenderness - -        TIME OUT:  Chart reviewed for the following:   I, Charline Victor MD, have reviewed the History, Physical and updated the Allergic reactions for Gregorio Travon II   TIME OUT performed immediately prior to start of procedure:  Zina Orozco MD, have performed the following reviews on Carpenter's II prior to the start of the procedure:          * Patient was identified by name and date of birth   * Agreement on procedure being performed was verified  * Risks and Benefits explained to the patient  * Procedure site verified and marked as necessary  * Patient was positioned for comfort  * Consent was obtained     Time: 10:51 AM     Date of procedure: 1/19/2023  Procedure performed by:  Charline Victor MD  Mr. Kassy Alba tolerated the procedure well with no complications. RADIOGRAPHS:  XR KNEES 9/30/22 [facility]  IMPRESSION:  Three views with bilateral knees on AP view - No fractures,  effusion, severe medial R and moderately severe medial L joint space narrowing,  osteophytes present. Kellgren Mukesh grade 4/3. IMPRESSION:      ICD-10-CM ICD-9-CM    1. Primary osteoarthritis of left knee  M17.12 715.16 DRAIN/INJECT LARGE JOINT/BURSA      sodium hyaluronate (SUPARTZ FX/EUFLEXXA/HYALGAN) 10 mg/mL injection syrg 20 mg      2. Primary osteoarthritis of right knee  M17.11 715.16 DRAIN/INJECT LARGE JOINT/BURSA      sodium hyaluronate (SUPARTZ FX/EUFLEXXA/HYALGAN) 10 mg/mL injection syrg 20 mg        PLAN:  After discussing treatment options, patient's knees were each injected with 2 cc Euflexxa. There is no need for surgery. He will follow up in 1 week for Euflexxa #2.       Scribed by Dayday Crockett ([address]) as dictated by Charline Victor MD

## 2023-01-23 ENCOUNTER — OFFICE VISIT (OUTPATIENT)
Dept: ORTHOPEDIC SURGERY | Age: 53
End: 2023-01-23
Payer: COMMERCIAL

## 2023-01-23 DIAGNOSIS — M25.422 EFFUSION OF LEFT OLECRANON BURSA: Primary | ICD-10-CM

## 2023-01-23 DIAGNOSIS — M70.22 OLECRANON BURSITIS OF LEFT ELBOW: ICD-10-CM

## 2023-01-23 DIAGNOSIS — M17.12 PRIMARY OSTEOARTHRITIS OF LEFT KNEE: ICD-10-CM

## 2023-01-23 DIAGNOSIS — M17.11 PRIMARY OSTEOARTHRITIS OF RIGHT KNEE: ICD-10-CM

## 2023-01-23 RX ORDER — BETAMETHASONE SODIUM PHOSPHATE AND BETAMETHASONE ACETATE 3; 3 MG/ML; MG/ML
3 INJECTION, SUSPENSION INTRA-ARTICULAR; INTRALESIONAL; INTRAMUSCULAR; SOFT TISSUE ONCE
Status: COMPLETED | OUTPATIENT
Start: 2023-01-23 | End: 2023-01-23

## 2023-01-23 RX ADMIN — BETAMETHASONE SODIUM PHOSPHATE AND BETAMETHASONE ACETATE 3 MG: 3; 3 INJECTION, SUSPENSION INTRA-ARTICULAR; INTRALESIONAL; INTRAMUSCULAR; SOFT TISSUE at 15:21

## 2023-01-23 NOTE — PROGRESS NOTES
Patient: Jose Sousa                MRN: [id number]       SSN: [id number]  YOB: 1970        AGE: 45 y.o. SEX: male    PCP: Noah Barahona MD  01/23/23    Chief Complaint   Patient presents with    Elbow Pain     Left elbow swelling     HISTORY:  Cristopher Diallo II is a 45 y.o. male who is seen for left elbow pain and swelling. He states he woke up yesterday with his left olecranon bursa very swollen. He was seen at [facility] yesterday. He denies any injury or h/o gout. He had been using a compression sleeve on the left elbow. He is also seen for bilateral knee pain. He has been experiencing bilateral knee pain for the past several years. He reports right popliteal stiffness. He feels pain with standing, walking and stair climbing. He experiences startup pain and stiffness after sitting. 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 He mostly works at the [employer]. 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 He lives in [place] with his wife, 2 yo son, and 10 yo daughter. He also has a 20 yo daughter from a previous marriage. Mr. Augusta White weighs 203 lbs and is 5'2\" tall. He states his weight fluctuates. He and his wife have four pasture horses and nine cows.      No results found for: HBA1C, LHH9PRHG, BYI2FILZ, RQG5SWEK  Weight Metrics 1/6/2023 12/15/2022 11/30/2022 9/30/2022 9/21/2022 3/10/2022 8/31/2021   Weight 203 lb 9.6 oz 203 lb 202 lb 203 lb 201 lb 200 lb 186 lb   BMI 37.24 kg/m2 37.13 kg/m2 36.95 kg/m2 37.13 kg/m2 36.76 kg/m2 36.58 kg/m2 34.02 kg/m2       Patient Active Problem List   Diagnosis Code    Severe obesity (BMI 35.0-39. 9) E66.01    Left lumbar radiculitis M54.16    DDD (degenerative disc disease), lumbar M51.36    Chronic cluster headache, not intractable G44.029    Hypertension, uncontrolled I10     REVIEW OF SYSTEMS:    Constitutional Symptoms: Negative   Eyes: Negative   Ears, Nose, Throat and Mouth: Negative   Cardiovascular: Negative   Respiratory: Negative   Genitourinary: Per HPI   Gastrointestinal: Per HPI   Integumentary (Skin and/or Breast): Negative   Musculoskeletal: Per HPI   Endocrine/Rheumatologic: Negative   Neurological: Per HPI   Hematology/Lymphatic: Negative    Allergic/Immunologic: Negative   Phychiatric: Negative    Social History     Socioeconomic History    Marital status:      Spouse name: Not on file    Number of children: Not on file    Years of education: Not on file    Highest education level: Not on file   Occupational History    Not on file   Tobacco Use    Smoking status: Never    Smokeless tobacco: Never   Substance and Sexual Activity    Alcohol use: No    Drug use: No    Sexual activity: Not on file   Other Topics Concern    Not on file   Social History Narrative    Not on file     Social Determinants of Health     Financial Resource Strain: Not on file   Food Insecurity: Not on file   Transportation Needs: Not on file   Physical Activity: Not on file   Stress: Not on file   Social Connections: Not on file   Intimate Partner Violence: Not on file   Housing Stability: Not on file      No Known Allergies   Current Outpatient Medications   Medication Sig    trimethoprim-sulfamethoxazole (BACTRIM DS, SEPTRA DS) 160-800 mg per tablet TAKE 1 TABLET BY MOUTH TWICE A DAY FOR 10 DAYS    amLODIPine-benazepril (LOTREL) 5-20 mg per capsule TAKE 1 CAPSULE BY MOUTH EVERY DAY    sildenafil citrate (Viagra) 50 mg tablet Take 1 Tablet by mouth daily as needed for Erectile Dysfunction. (Patient not taking: No sig reported)    diphenoxylate-atropine (LOMOTIL) 2.5-0.025 mg/5 mL liquid Take 5 mL by mouth four (4) times daily as needed for Diarrhea. Max Daily Amount: 20 mL. (Patient not taking: No sig reported)    esomeprazole (NEXIUM) 20 mg capsule Take  by mouth daily. acetaminophen (TYLENOL PO) Take  by mouth. (Patient not taking: No sig reported)    topiramate ER (TROKENDI XR) 25 mg capsule 1 tab po qhs for 1 week then increase to 2 tab po qhs thereafter. (Patient not taking: No sig reported)    diphenhydrAMINE (BENADRYL) 25 mg tablet Take 25 mg by mouth every six (6) hours as needed for Sleep. (Patient not taking: No sig reported)    cyanocobalamin 1,000 mcg tablet Take 1,000 mcg by mouth daily. (Patient not taking: No sig reported)    pyridoxine, vitamin B6, (VITAMIN B-6) 100 mg tablet Take 100 mg by mouth daily. (Patient not taking: Reported on 11/30/2022)    glucosamine/chondr benz A sod (OSTEO BI-FLEX PO) Take  by mouth.    multivitamin (ONE A DAY) tablet Take 1 Tab by mouth daily. Current Facility-Administered Medications   Medication Dose Route Frequency    sodium hyaluronate (SUPARTZ FX/EUFLEXXA/HYALGAN) 10 mg/mL injection syrg 20 mg  20 mg Intra artICUlar ONCE    betamethasone (CELESTONE) injection 3 mg  3 mg IntraBURSal ONCE      PHYSICAL EXAMINATION:  There were no vitals taken for this visit.    ORTHO EXAMINATION:  Examination Right Elbow Left Elbow   Skin Intact Intact   Range of Motion 135-0 135-0   Tenderness - -   Swelling - +++ olecranon bursa   Bruising - ++ circumferential around olecranon   Stability Normal Normal   Motor Strength  Normal Normal   Neurovascular Intact Intact        RADIOGRAPHS:  XR KNEES 9/30/22 BROOKE  IMPRESSION:  Three views with bilateral knees on AP view - No fractures,  effusion, severe medial R and moderately severe medial L joint space narrowing,  osteophytes present. Kellgren Mukesh grade 4/3. TIME OUT:  Chart reviewed for the following:   Mayco Faust MD, have reviewed the History, Physical and updated the Allergic reactions for Cornelio Ort II   TIME OUT performed immediately prior to start of procedure:  Mayco Faust MD, have performed the following reviews on Cornelio Ort II prior to the start of the procedure:          * Patient was identified by name and date of birth   * Agreement on procedure being performed was verified  * Risks and Benefits explained to the patient  * Procedure site verified and marked as necessary  * Patient was positioned for comfort  * Consent was obtained     Time: 3:07 PM     Date of procedure: 1/23/2023  Procedure performed by:  lForence Wilson MD  Mr. Marilee Hansen tolerated the procedure well with no complications. IMPRESSION:      ICD-10-CM ICD-9-CM    1. Effusion of left olecranon bursa  M25.422 719.02 DRAIN/INJECT INTERMEDIATE JOINT/BURSA      betamethasone (CELESTONE) injection 3 mg      2. Olecranon bursitis of left elbow  M70.22 726.33 DRAIN/INJECT INTERMEDIATE JOINT/BURSA      betamethasone (CELESTONE) injection 3 mg      3. Primary osteoarthritis of right knee  M17.11 715.16 DRAIN/INJECT LARGE JOINT/BURSA      sodium hyaluronate (SUPARTZ FX/EUFLEXXA/HYALGAN) 10 mg/mL injection syrg 20 mg      4. Primary osteoarthritis of left knee  M17.12 715.16 DRAIN/INJECT LARGE JOINT/BURSA      sodium hyaluronate (SUPARTZ FX/EUFLEXXA/HYALGAN) 10 mg/mL injection syrg 20 mg        PLAN:  After timeout and under sterile conditions, the left olecranon bursa aspirated 8 cc of bloody fluid. The fluid was discarded. After discussing treatment options, patient's left olecranon bursa was injected with 1/2 cc Sensorcaine and 1/2 cc Celestone. Patient's knees were each injected with 2 cc of Euflexxa. Mr. Jadiel Liao will follow up in one week to complete his visco supplementation injection series.       Scribed by Dennie Molly ([address] [place] [address] [address]) as dictated by Rodney Baker MD

## 2023-01-30 ENCOUNTER — OFFICE VISIT (OUTPATIENT)
Dept: ORTHOPEDIC SURGERY | Age: 53
End: 2023-01-30
Payer: COMMERCIAL

## 2023-01-30 VITALS — WEIGHT: 205 LBS | BODY MASS INDEX: 37.49 KG/M2 | TEMPERATURE: 97 F

## 2023-01-30 DIAGNOSIS — M17.12 PRIMARY OSTEOARTHRITIS OF LEFT KNEE: Primary | ICD-10-CM

## 2023-01-30 DIAGNOSIS — M17.11 PRIMARY OSTEOARTHRITIS OF RIGHT KNEE: ICD-10-CM

## 2023-01-30 PROCEDURE — 20610 DRAIN/INJ JOINT/BURSA W/O US: CPT | Performed by: SPECIALIST

## 2023-01-30 NOTE — PROGRESS NOTES
Patient: Bulmaro Hall                MRN: [id number]       SSN: [id number]  YOB: 1970        AGE: 45 y.o. SEX: male  Body mass index is 37.49 kg/m². PCP: Fiona Peguero MD  01/30/23    Chief Complaint   Patient presents with    Knee Pain     Bilateral knee pain       HISTORY:  Dayami Damon II is a 45 y.o. male who is seen for bilateral knee pain. TIME OUT performed immediately prior to start of procedure:  Rosemary Feldman MD, have performed the following reviews on Dayami Damon II prior to the start of the procedure:            * Patient was identified by name and date of birth   * Agreement on procedure being performed was verified  * Risks and Benefits explained to the patient  * Procedure site verified and marked as necessary  * Patient was positioned for comfort  * Consent was obtained     Time: 11:22 AM      Date of procedure: 1/30/2023    Procedure performed by:  Britt Vega MD    Mr. Dayanna Gutierrez tolerated the procedure well with no complications      [id number] ICD-9-CM    1. Primary osteoarthritis of left knee  M17.12 715.16 DRAIN/INJECT LARGE JOINT/BURSA      sodium hyaluronate (SUPARTZ FX/EUFLEXXA/HYALGAN) 10 mg/mL injection syrg 20 mg      2. Primary osteoarthritis of right knee  M17.11 715.16 DRAIN/INJECT LARGE JOINT/BURSA      sodium hyaluronate (SUPARTZ FX/EUFLEXXA/HYALGAN) 10 mg/mL injection syrg 20 mg        PLAN:  After discussing treatment options, patient's knees were each injected with 2 cc of Euflexxa. Mr. Dayanna Gutierrez will follow up PRN now that he has completed his visco supplementation injection series.       Scribed by Mahsa Tran ([address]) as dictated by Britt Vega MD

## 2023-02-07 ENCOUNTER — OFFICE VISIT (OUTPATIENT)
Dept: FAMILY MEDICINE CLINIC | Age: 53
End: 2023-02-07
Payer: COMMERCIAL

## 2023-02-07 VITALS
WEIGHT: 201 LBS | OXYGEN SATURATION: 95 % | HEART RATE: 88 BPM | DIASTOLIC BLOOD PRESSURE: 75 MMHG | SYSTOLIC BLOOD PRESSURE: 120 MMHG | BODY MASS INDEX: 36.76 KG/M2 | TEMPERATURE: 98.9 F

## 2023-02-07 DIAGNOSIS — M25.50 ARTHRALGIA, UNSPECIFIED JOINT: ICD-10-CM

## 2023-02-07 DIAGNOSIS — I10 PRIMARY HYPERTENSION: Primary | ICD-10-CM

## 2023-02-07 PROCEDURE — 99213 OFFICE O/P EST LOW 20 MIN: CPT | Performed by: FAMILY MEDICINE

## 2023-02-07 PROCEDURE — 3078F DIAST BP <80 MM HG: CPT | Performed by: FAMILY MEDICINE

## 2023-02-07 PROCEDURE — 3074F SYST BP LT 130 MM HG: CPT | Performed by: FAMILY MEDICINE

## 2023-02-07 RX ORDER — CELECOXIB 200 MG/1
200 CAPSULE ORAL 2 TIMES DAILY
Qty: 60 CAPSULE | Refills: 2 | Status: SHIPPED | OUTPATIENT
Start: 2023-02-07 | End: 2023-05-08

## 2023-02-07 NOTE — PROGRESS NOTES
Pt here for a swelling on left elbow with tenderness. 1. \"Have you been to the ER, urgent care clinic since your last visit? Hospitalized since your last visit? \" Yes 1/29/23 to [facility]. 2. \"Have you seen or consulted any other health care providers outside of the [address] [place] [address] since your last visit? \" No     3. For patients aged 39-70: Has the patient had a colonoscopy / FIT/ Cologuard? Yes - no Care Gap present      If the patient is female:    4. For patients aged 41-77: Has the patient had a mammogram within the past 2 years? NA - based on age or sex      11. For patients aged 21-65: Has the patient had a pap smear?  NA - based on age or sex

## 2023-02-07 NOTE — PROGRESS NOTES
Yi Leon II is a 45 y.o. male  presents for follow up on joint pain and BP. No chest pains or SOB  No Known Allergies  Outpatient Medications Marked as Taking for the 2/7/23 encounter (Office Visit) with Audley Goldberg, MD   Medication Sig Dispense Refill    celecoxib (CELEBREX) 200 mg capsule Take 1 Capsule by mouth two (2) times a day for 90 days. 60 Capsule 2    amLODIPine-benazepril (LOTREL) 5-20 mg per capsule TAKE 1 CAPSULE BY MOUTH EVERY DAY 90 Capsule 1    esomeprazole (NEXIUM) 20 mg capsule Take  by mouth daily. acetaminophen (TYLENOL PO) Take  by mouth.      cyanocobalamin 1,000 mcg tablet Take 1,000 mcg by mouth daily. pyridoxine, vitamin B6, (VITAMIN B-6) 100 mg tablet Take 100 mg by mouth daily. glucosamine/chondr benz A sod (OSTEO BI-FLEX PO) Take  by mouth.      multivitamin (ONE A DAY) tablet Take 1 Tab by mouth daily. Patient Active Problem List   Diagnosis Code    Severe obesity (BMI 35.0-39. 9) E66.01    Left lumbar radiculitis M54.16    DDD (degenerative disc disease), lumbar M51.36    Chronic cluster headache, not intractable G44.029    Hypertension, uncontrolled I10     Past Medical History:   Diagnosis Date    Arthritis     bilat knees    GERD (gastroesophageal reflux disease)     Neck injury about 2007    hit head on pipe, in [place], tx chiro, told he had HNP     Social History     Socioeconomic History    Marital status:    Tobacco Use    Smoking status: Never    Smokeless tobacco: Never   Substance and Sexual Activity    Alcohol use: No    Drug use: No     Family History   Problem Relation Age of Onset    COPD Mother     Neuropathy Mother     No Known Problems Father     No Known Problems Sister     Diabetes Brother     GERD Brother     No Known Problems Brother     No Known Problems Sister     Cancer Other         Review of Systems   Constitutional:  Negative for chills, fever, malaise/fatigue and weight loss. Eyes:  Negative for blurred vision. Respiratory:  Negative for cough, shortness of breath and wheezing. Cardiovascular:  Negative for chest pain. Gastrointestinal:  Negative for nausea and vomiting. Musculoskeletal:  Negative for myalgias. Skin:  Negative for rash. Neurological:  Negative for weakness. Psychiatric/Behavioral: Negative. Vitals:    02/07/23 1419   BP: 120/75   Pulse: 88   Temp: 98.9 °F (37.2 °C)   TempSrc: Tympanic   SpO2: 95%   Weight: 201 lb (91.2 kg)   PainSc:   0 - No pain       Physical Exam  Vitals and nursing note reviewed. Constitutional:       Appearance: Normal appearance. Cardiovascular:      Rate and Rhythm: Normal rate and regular rhythm. Heart sounds: Normal heart sounds. Pulmonary:      Effort: Pulmonary effort is normal.      Breath sounds: Normal breath sounds. Musculoskeletal:         General: Tenderness (knees and elbow on left.) present. Normal range of motion. Cervical back: Normal range of motion and neck supple. Skin:     General: Skin is warm and dry. Capillary Refill: Capillary refill takes less than 2 seconds. Neurological:      General: No focal deficit present. Mental Status: He is alert and oriented to person, place, and time. Psychiatric:         Mood and Affect: Mood normal.         Behavior: Behavior normal.         Thought Content: Thought content normal.         Judgment: Judgment normal.       Assessment/Plan      ICD-10-CM ICD-9-CM    1. Primary hypertension  I10 401.9       2. Arthralgia, unspecified joint  M25.50 719.40 REFERRAL TO PHYSICIAL MEDICINE REHAB      celecoxib (CELEBREX) 200 mg capsule        Follow-up and Dispositions    Return in about 6 months (around 8/7/2023). I have discussed the diagnosis with the patient and the intended plan of care as seen in the above orders. The patient has received an after-visit summary and questions were answered concerning future plans.  I have discussed medication, side effects, and warnings with the patient in detail. The patient verbalized understanding and is in agreement with the plan of care. The patient will contact the office with any additional concerns.     lab results and schedule of future lab studies reviewed with patient    Arden Macdonald MD

## 2023-03-17 ENCOUNTER — OFFICE VISIT (OUTPATIENT)
Age: 53
End: 2023-03-17

## 2023-03-17 VITALS — HEIGHT: 62 IN | BODY MASS INDEX: 38.68 KG/M2 | TEMPERATURE: 97.8 F | WEIGHT: 210.2 LBS

## 2023-03-17 DIAGNOSIS — M25.562 CHRONIC PAIN OF LEFT KNEE: ICD-10-CM

## 2023-03-17 DIAGNOSIS — M25.561 CHRONIC PAIN OF RIGHT KNEE: ICD-10-CM

## 2023-03-17 DIAGNOSIS — M17.11 UNILATERAL PRIMARY OSTEOARTHRITIS, RIGHT KNEE: ICD-10-CM

## 2023-03-17 DIAGNOSIS — G89.29 CHRONIC PAIN OF RIGHT KNEE: ICD-10-CM

## 2023-03-17 DIAGNOSIS — G89.29 CHRONIC PAIN OF LEFT KNEE: ICD-10-CM

## 2023-03-17 DIAGNOSIS — M17.12 UNILATERAL PRIMARY OSTEOARTHRITIS, LEFT KNEE: Primary | ICD-10-CM

## 2023-03-17 RX ORDER — TRIAMCINOLONE ACETONIDE 40 MG/ML
20 INJECTION, SUSPENSION INTRA-ARTICULAR; INTRAMUSCULAR ONCE
Status: COMPLETED | OUTPATIENT
Start: 2023-03-17 | End: 2023-03-17

## 2023-03-17 RX ADMIN — TRIAMCINOLONE ACETONIDE 20 MG: 40 INJECTION, SUSPENSION INTRA-ARTICULAR; INTRAMUSCULAR at 15:29

## 2023-03-17 RX ADMIN — TRIAMCINOLONE ACETONIDE 20 MG: 40 INJECTION, SUSPENSION INTRA-ARTICULAR; INTRAMUSCULAR at 15:31

## 2023-03-17 NOTE — PROGRESS NOTES
Patient: Philly Player                MRN:  [id number]       SSN: [id number]   YOB: 1970        AGE:  45 y.o. SEX: male      PCP: Carissa Toledo MD   01/19/23      No chief complaint on file. HISTORY:  Pedro Acosta II is a 45 y.o. male who is seen for increased bilateral knee pain. He responded to his injection last ov but his pains have returned. He  has been experiencing bilateral knee pain for the past several years. He reports right popliteal stiffness. He feels pain with standing,  walking and stair climbing. He experiences startup pain after sitting. 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 He mostly  works at the [employer]. 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 He lives in [place] with his wife, 2 yo son, and 10 yo daughter. He also has a 22 yo daughter from a previous marriage. Mr. Pepito Zhang weighs 202 lbs and is 5'2\" tall. He has lost 6 lb  since his last office visit. He has nine cows. He recently sold his four pasture horses.       No results found for: HBA1C, YMF1HSAU, DFB9VNGQ, VQF8MOJU            Weight Metrics  1/6/2023  12/15/2022  11/30/2022  9/30/2022  9/21/2022  3/10/2022  8/31/2021 Weight  203 lb 9.6 oz  203 lb  202 lb  203 lb  201 lb  200 lb  186 lb              BMI  37.24 kg/m2  37.13 kg/m2  36.95 kg/m2  37.13 kg/m2  36.76 kg/m2  36.58 kg/m2  34.02 kg/m2                  Patient Active Problem List        Diagnosis  Code           Severe obesity (BMI 35.0-39. 9)  E66.01       Left lumbar radiculitis  M54.16       DDD (degenerative disc disease), lumbar  M51.36       Chronic cluster headache, not intractable  G44.029           Hypertension, uncontrolled  I10        REVIEW OF SYSTEMS:               Constitutional Symptoms: Negative              Eyes: Negative              Ears, Nose, Throat and Mouth: Negative              Cardiovascular: Negative              Respiratory: Negative              Genitourinary: Per HPI              Gastrointestinal: Per HPI              Integumentary (Skin and/or Breast): Negative              Musculoskeletal: Per HPI              Endocrine/Rheumatologic: Negative              Neurological: Per HPI              Hematology/Lymphatic: Negative               Allergic/Immunologic: Negative              Phychiatric: Negative        Social History                Socioeconomic History           Marital status:                Spouse name:  Not on file           Number of children:  Not on file       Years of education:  Not on file       Highest education level:  Not on file       Occupational History          Not on file       Tobacco Use           Smoking status:  Never       Smokeless tobacco:  Never       Substance and Sexual Activity           Alcohol use:  No       Drug use:  No       Sexual activity:  Not on file        Other Topics  Concern          Not on file       Social History Narrative          Not on file          Social Determinants of Health          Financial Resource Strain: Not on file     Food Insecurity: Not on file     Transportation Needs: Not on file     Physical Activity: Not on file     Stress: Not on file     Social Connections: Not on file     Intimate Partner Violence: Not on file       Housing Stability: Not on file         No Known Allergies           Current Outpatient Medications        Medication  Sig           trimethoprim-sulfamethoxazole (BACTRIM DS, SEPTRA DS) 160-800 mg per tablet  TAKE 1 TABLET BY MOUTH TWICE A DAY FOR 10 DAYS       amLODIPine-benazepril (LOTREL) 5-20 mg per capsule  TAKE 1 CAPSULE BY MOUTH EVERY DAY       sildenafil citrate (Viagra) 50 mg tablet  Take 1 Tablet by mouth daily as needed for Erectile Dysfunction. (Patient not taking: No sig reported)       diphenoxylate-atropine (LOMOTIL) 2.5-0.025 mg/5 mL liquid  Take 5 mL by mouth four (4) times daily as needed for Diarrhea. Max Daily Amount: 20 mL. (Patient not taking: No sig reported)       esomeprazole (NEXIUM) 20 mg capsule  Take  by mouth daily. acetaminophen (TYLENOL PO)  Take  by mouth. (Patient not taking: No sig reported)       topiramate ER (TROKENDI XR) 25 mg capsule  1 tab po qhs for 1 week then increase to 2 tab po qhs thereafter. (Patient not taking: No sig reported)       diphenhydrAMINE (BENADRYL) 25 mg tablet  Take 25 mg by mouth every six (6) hours as needed for Sleep. (Patient not taking: No sig reported)       cyanocobalamin 1,000 mcg tablet  Take 1,000 mcg by mouth daily. (Patient not taking: No sig reported)       pyridoxine, vitamin B6, (VITAMIN B-6) 100 mg tablet  Take 100 mg by mouth daily. (Patient not taking: Reported on 11/30/2022)       glucosamine/chondr gallego A sod (OSTEO BI-FLEX PO)  Take  by mouth.           multivitamin (ONE A DAY) tablet  Take 1 Tab by mouth daily. Current Facility-Administered Medications          Medication  Dose  Route  Frequency             sodium hyaluronate (SUPARTZ FX/EUFLEXXA/HYALGAN) 10 mg/mL injection syrg 20 mg   20 mg  Intra artICUlar  ONCE         PHYSICAL EXAMINATION:   There were no vitals taken for this visit.     ORTHO EXAMINATION:       Examination  Right knee Left knee         Skin  Intact  Intact         Range of motion  110-0  100-5     Effusion  ++  -     Medial joint line tenderness  +  +     Lateral joint line tenderness  -  -     Popliteal tenderness  -  -     Osteophytes palpable  +  +     Nash's  -  -     Patella crepitus  +  +     Anterior drawer  -  -     Lateral laxity  -  -     Medial laxity  -  -     Varus deformity  -  -     Valgus deformity  -  -     Pretibial edema  ++  ++         Calf tenderness  -  -            TIME OUT:   Chart reviewed for the following:              Nishant Varela MD, have reviewed the History, Physical and updated the Allergic reactions for Jaycee Bence II    TIME OUT performed immediately prior to start of procedure:   Nishant Varela MD, have performed the following reviews on Jaycee Bence II prior to the start of the procedure:           * Patient was identified by name and date of birth    * Agreement on procedure being performed was verified   * Risks and Benefits explained to the patient   * Procedure site verified and marked as necessary   * Patient was positioned for comfort   * Consent was obtained                 Time: 10:51 AM       Date of procedure: 1/19/2023   Procedure performed by:  David Cotto MD   Mr. Laila Arteaga tolerated the procedure well with no complications. RADIOGRAPHS:   XR KNEES 9/30/22 KEYONNA   IMPRESSION:  Three views with bilateral knees on AP view - No fractures,  effusion, severe medial R and moderately severe medial L joint space narrowing,  osteophytes present. Kellgren Christian  grade 4/3. IMPRESSION:    Encounter Diagnoses   Name Primary? Unilateral primary osteoarthritis, left knee Yes    Chronic pain of left knee     Unilateral primary osteoarthritis, right knee     Chronic pain of right knee        PLAN:   After discussing treatment options, patient's knees were injected with 4 cc Marcaine and 1/2 cc Kenalog. I will see him back in 2 months. Consider viscosupplementation if pain continues.

## 2023-04-05 ENCOUNTER — OFFICE VISIT (OUTPATIENT)
Age: 53
End: 2023-04-05
Payer: COMMERCIAL

## 2023-04-05 VITALS
TEMPERATURE: 98.3 F | BODY MASS INDEX: 38.28 KG/M2 | HEART RATE: 83 BPM | HEIGHT: 62 IN | SYSTOLIC BLOOD PRESSURE: 136 MMHG | WEIGHT: 208 LBS | DIASTOLIC BLOOD PRESSURE: 84 MMHG | OXYGEN SATURATION: 95 % | RESPIRATION RATE: 16 BRPM

## 2023-04-05 DIAGNOSIS — M17.2 BILATERAL POST-TRAUMATIC OSTEOARTHRITIS OF KNEE: ICD-10-CM

## 2023-04-05 DIAGNOSIS — R29.898 LEFT ARM WEAKNESS: ICD-10-CM

## 2023-04-05 DIAGNOSIS — M48.02 CERVICAL SPINAL STENOSIS: Primary | ICD-10-CM

## 2023-04-05 PROCEDURE — 3078F DIAST BP <80 MM HG: CPT | Performed by: PHYSICAL MEDICINE & REHABILITATION

## 2023-04-05 PROCEDURE — 3074F SYST BP LT 130 MM HG: CPT | Performed by: PHYSICAL MEDICINE & REHABILITATION

## 2023-04-05 PROCEDURE — 99203 OFFICE O/P NEW LOW 30 MIN: CPT | Performed by: PHYSICAL MEDICINE & REHABILITATION

## 2023-04-05 RX ORDER — METHYLPREDNISOLONE 4 MG/1
TABLET ORAL
Qty: 1 KIT | Refills: 0 | Status: SHIPPED | OUTPATIENT
Start: 2023-04-05

## 2023-04-05 ASSESSMENT — PATIENT HEALTH QUESTIONNAIRE - PHQ9
SUM OF ALL RESPONSES TO PHQ QUESTIONS 1-9: 0
SUM OF ALL RESPONSES TO PHQ9 QUESTIONS 1 & 2: 0
SUM OF ALL RESPONSES TO PHQ QUESTIONS 1-9: 0
SUM OF ALL RESPONSES TO PHQ QUESTIONS 1-9: 0
1. LITTLE INTEREST OR PLEASURE IN DOING THINGS: 0
2. FEELING DOWN, DEPRESSED OR HOPELESS: 0
SUM OF ALL RESPONSES TO PHQ QUESTIONS 1-9: 0

## 2023-04-05 NOTE — PROGRESS NOTES
Chief Complaint   Patient presents with    New Patient              Pt preferred language for health care discussion is english. Is someone accompanying this pt? no    Is the patient using any DME equipment during OV? no    Depression Screening:  No flowsheet data found. Learning Assessment:  No flowsheet data found. Abuse Screening:  No flowsheet data found. Fall Risk  No flowsheet data found. Advance Directive:  1. Do you have an advance directive in place? Patient Reply:no    2. If not, would you like material regarding how to put one in place? Patient Reply: no    2. Per patient no changes to their ACP contact no. Coordination of Care:  1. Have you been to the ER, urgent care clinic since your last visit? Hospitalized since your last visit? no    2. Have you seen or consulted any other health care providers outside of the [address] [place] [address] since your last visit? Include any pap smears or colon screening.  no
5 mLs by mouth 4 times daily as needed. (Patient not taking: Reported on 4/5/2023), Disp: , Rfl:     sulfamethoxazole-trimethoprim (BACTRIM DS;SEPTRA DS) 800-160 MG per tablet, TAKE 1 TABLET BY MOUTH TWICE A DAY FOR 10 DAYS (Patient not taking: Reported on 4/5/2023), Disp: , Rfl:     topiramate ER (TROKENDI XR) 25 MG CP24, 1 tab po qhs for 1 week then increase to 2 tab po qhs thereafter. (Patient not taking: Reported on 4/5/2023), Disp: , Rfl:       No Known Allergies    REVIEW OF SYSTEMS    Constitutional: Negative for fever, chills, or weight change. Respiratory: Negative for cough or shortness of breath. Cardiovascular: Negative for chest pain or palpitations. Gastrointestinal: Negative for acid reflux, change in bowel habits, or constipation. Genitourinary: Negative for dysuria and flank pain. Musculoskeletal: Positive for bilateral knee pain. Skin: Negative for rash. Neurological: Negative for headaches or dizziness. Positive for left arm tingling/numbness. Endo/Heme/Allergies: Negative for increased bruising. Psychiatric/Behavioral: Negative for difficulty with sleep. As per HPI    PHYSICAL EXAMINATION  /84 (Site: Left Upper Arm, Position: Sitting, Cuff Size: Medium Adult)   Pulse 83   Temp 98.3 °F (36.8 °C) (Temporal)   Resp 16   Ht 5' 2\" (1.575 m)   Wt 208 lb (94.3 kg)   SpO2 95%   BMI 38.04 kg/m²     Constitutional: Awake, alert, and in no acute distress. HEENT: Normocephalic. Atraumatic. Oropharynx is moist and clear. PERRL. EOMI. Sclerae are nonicteric  Cardiovascular: Regular rate and rhythm  Lungs: Clear to auscultation bilaterally  Abdomen: Soft and nontender. Bowel sounds are present  Neurological: 1+ symmetrical DTRs in the upper extremities. 1+ symmetrical DTRs in the lower extremities. Sensation to light touch is intact. Negative Gutierrez's sign bilaterally. Skin: warm, dry, and intact. Musculoskeletal: Tenderness to palpation along the medial aspect of both knees.

## 2023-04-18 DIAGNOSIS — L02.91 CUTANEOUS ABSCESS, UNSPECIFIED: ICD-10-CM

## 2023-04-18 DIAGNOSIS — L03.90 CELLULITIS, UNSPECIFIED: ICD-10-CM

## 2023-04-18 RX ORDER — SULFAMETHOXAZOLE AND TRIMETHOPRIM 800; 160 MG/1; MG/1
TABLET ORAL
Qty: 20 TABLET | Refills: 0 | Status: SHIPPED | OUTPATIENT
Start: 2023-04-18

## 2023-04-18 NOTE — TELEPHONE ENCOUNTER
Patient's wife called in about this and the reason they are asking for a refill is because he gets boils from working with [employer] and states Dr. Herlinda Shaffer knows about this and will give him this med for that.

## 2023-05-03 ENCOUNTER — OFFICE VISIT (OUTPATIENT)
Age: 53
End: 2023-05-03

## 2023-05-03 VITALS — WEIGHT: 207 LBS | HEIGHT: 62 IN | BODY MASS INDEX: 38.09 KG/M2

## 2023-05-03 DIAGNOSIS — M17.11 UNILATERAL PRIMARY OSTEOARTHRITIS, RIGHT KNEE: ICD-10-CM

## 2023-05-03 DIAGNOSIS — M17.12 UNILATERAL PRIMARY OSTEOARTHRITIS, LEFT KNEE: Primary | ICD-10-CM

## 2023-05-03 RX ORDER — BETAMETHASONE SODIUM PHOSPHATE AND BETAMETHASONE ACETATE 3; 3 MG/ML; MG/ML
3 INJECTION, SUSPENSION INTRA-ARTICULAR; INTRALESIONAL; INTRAMUSCULAR; SOFT TISSUE ONCE
Status: COMPLETED | OUTPATIENT
Start: 2023-05-03 | End: 2023-05-03

## 2023-05-03 RX ADMIN — BETAMETHASONE SODIUM PHOSPHATE AND BETAMETHASONE ACETATE 3 MG: 3; 3 INJECTION, SUSPENSION INTRA-ARTICULAR; INTRALESIONAL; INTRAMUSCULAR; SOFT TISSUE at 11:22

## 2023-05-03 NOTE — PROGRESS NOTES
Patient: Jarvis Verduzco                MRN: [id number]       SSN: [id number]  YOB: 1970        AGE: 45 y.o. SEX: male      PCP: Lazaro Sultana MD  05/03/23    Chief Complaint   Patient presents with    Knee Pain     bilateral       HISTORY:  Theresa Rose II is a 45 y.o. male who is seen for increased bilateral knee pain. He reports swelling and pain when walking. He responded to his injection last ov but his pains have returned. He has been experiencing bilateral knee pain for the past several years. He reports right popliteal stiffness. He feels pain with standing,  walking and stair climbing. He experiences startup pain after sitting. 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 He mostly  works at the [employer]. His company is currently putting nonskid floor coating on the [employer]. He lives in [place] with his wife, 2 yo son, and 10 yo daughter. He also has a 22 yo daughter from a previous marriage. Mr. Slim Roland weighs 202 lbs and is 5'2\" tall. He has lost 6 lb  since his last office visit. He has nine cows. He recently sold his four pasture horses. Wt Readings from Last 3 Encounters:   05/03/23 207 lb (93.9 kg)   04/05/23 208 lb (94.3 kg)   03/17/23 210 lb 3.2 oz (95.3 kg)      Body mass index is 37.86 kg/m².     Patient Active Problem List   Diagnosis    DDD (degenerative disc disease), lumbar    Hypertension, uncontrolled    Left lumbar radiculitis    Chronic cluster headache, not intractable       Social History     Tobacco Use    Smoking status: Never    Smokeless tobacco: Never   Substance Use Topics    Alcohol use: No    Drug use: No        No Known Allergies     Current Outpatient Medications   Medication Sig    sulfamethoxazole-trimethoprim (BACTRIM DS;SEPTRA DS) 800-160 MG per tablet TAKE

## 2023-05-31 DIAGNOSIS — L02.91 CUTANEOUS ABSCESS, UNSPECIFIED: ICD-10-CM

## 2023-05-31 DIAGNOSIS — L03.90 CELLULITIS, UNSPECIFIED: ICD-10-CM

## 2023-05-31 RX ORDER — SULFAMETHOXAZOLE AND TRIMETHOPRIM 800; 160 MG/1; MG/1
1 TABLET ORAL 2 TIMES DAILY
Qty: 20 TABLET | Refills: 0 | Status: SHIPPED | OUTPATIENT
Start: 2023-05-31

## 2023-05-31 NOTE — TELEPHONE ENCOUNTER
This patient contacted office for the following prescriptions to be filled:    Medication requested :   Requested Prescriptions     Pending Prescriptions Disp Refills    sulfamethoxazole-trimethoprim (BACTRIM DS;SEPTRA DS) 800-160 MG per tablet 20 tablet 0      PCP: Dr. Vannessa Holley or Print: [facility]  Mail order or Local pharmacy: [phone]    Scheduled appointment if not seen by current providers in office: [address] [place] [address]  2/7/23, next appt due Aug 2023      Patient is requesting due to a boil on his back.

## 2023-06-15 ENCOUNTER — HOSPITAL ENCOUNTER (OUTPATIENT)
Facility: HOSPITAL | Age: 53
Discharge: HOME | End: 2023-06-18

## 2023-06-15 LAB — LABCORP SPECIMEN COLLECTION: NORMAL

## 2023-06-21 DIAGNOSIS — R07.9 CHEST PAIN, UNSPECIFIED TYPE: Primary | ICD-10-CM

## 2023-07-07 RX ORDER — SULFAMETHOXAZOLE AND TRIMETHOPRIM 800; 160 MG/1; MG/1
1 TABLET ORAL 2 TIMES DAILY
Qty: 20 TABLET | Refills: 0 | Status: SHIPPED | OUTPATIENT
Start: 2023-07-07 | End: 2023-07-17

## 2023-08-25 ENCOUNTER — OFFICE VISIT (OUTPATIENT)
Age: 53
End: 2023-08-25

## 2023-08-25 VITALS
OXYGEN SATURATION: 98 % | WEIGHT: 200 LBS | SYSTOLIC BLOOD PRESSURE: 146 MMHG | DIASTOLIC BLOOD PRESSURE: 72 MMHG | HEIGHT: 62 IN | BODY MASS INDEX: 36.8 KG/M2 | HEART RATE: 74 BPM

## 2023-08-25 DIAGNOSIS — Z82.49 FAMILY HISTORY OF EARLY CAD: Primary | ICD-10-CM

## 2023-08-25 DIAGNOSIS — I10 HYPERTENSION, UNSPECIFIED TYPE: ICD-10-CM

## 2023-08-25 RX ORDER — OMEPRAZOLE 40 MG/1
CAPSULE, DELAYED RELEASE ORAL
COMMUNITY
Start: 2023-07-24

## 2023-08-25 RX ORDER — SUCRALFATE 1 G/1
TABLET ORAL
COMMUNITY
Start: 2023-08-03

## 2023-08-25 ASSESSMENT — ENCOUNTER SYMPTOMS
ABDOMINAL PAIN: 0
COLOR CHANGE: 0
DIARRHEA: 0
CHEST TIGHTNESS: 0
SHORTNESS OF BREATH: 0
APNEA: 0
COUGH: 0
WHEEZING: 0
NAUSEA: 0
BLOOD IN STOOL: 0
CONSTIPATION: 0

## 2023-08-25 NOTE — PATIENT INSTRUCTIONS
Learning About the [address] [place] [address] Diet  What is the Mediterranean diet? The Mediterranean diet is a style of eating rather than a diet plan. It features foods eaten in [facility], [place], [place] and [place], and other countries along the [facility]. It emphasizes eating foods like fish, fruits, vegetables, beans, high-fiber breads and whole grains, nuts, and olive oil. This style of eating includes limited red meat, cheese, and sweets. Why choose the Mediterranean diet? A Mediterranean-style diet may improve heart health. It contains more fat than other heart-healthy diets. But the fats are mainly from nuts, unsaturated oils (such as fish oils and olive oil), and certain nut or seed oils (such as canola, soybean, or flaxseed oil). These fats may help protect the heart and blood vessels. How can you get started on the Mediterranean diet? Here are some things you can do to switch to a more Mediterranean way of eating. What to eat  Eat a variety of fruits and vegetables each day, such as grapes, blueberries, tomatoes, broccoli, peppers, figs, olives, spinach, eggplant, beans, lentils, and chickpeas. Eat a variety of whole-grain foods each day, such as oats, brown rice, and whole wheat bread, pasta, and couscous. Eat fish at least 2 times a week. Try tuna, salmon, mackerel, lake trout, herring, or sardines. Eat moderate amounts of low-fat dairy products, such as milk, cheese, or yogurt. Eat moderate amounts of poultry and eggs. Choose healthy (unsaturated) fats, such as nuts, olive oil, and certain nut or seed oils like canola, soybean, and flaxseed. Limit unhealthy (saturated) fats, such as butter, palm oil, and coconut oil. And limit fats found in animal products, such as meat and dairy products made with whole milk. Try to eat red meat only a few times a month in very small amounts. Limit sweets and desserts to only a few times a week. This includes sugar-sweetened drinks like soda.   The

## 2023-08-25 NOTE — PROGRESS NOTES
Have you had PND? No     2. Have you had Fatigue? No     3. Have you had Dyspnea? No     4. Have you had Orthopnea? No     5. Have you had Palpitations? No     6. Have you had Syncope? No     7. Have you had Chest Pain? No     8. Have you had any weight gain? No     9. Have you had any swelling?  No
normal.   Neck:      Vascular: No carotid bruit. Cardiovascular:      Rate and Rhythm: Normal rate and regular rhythm. Heart sounds: Normal heart sounds. No murmur heard. No friction rub. No gallop. Pulmonary:      Effort: Pulmonary effort is normal. No respiratory distress. Breath sounds: Normal breath sounds. No wheezing, rhonchi or rales. Chest:      Chest wall: No tenderness. Abdominal:      General: Bowel sounds are normal. There is no distension. Palpations: Abdomen is soft. Tenderness: There is no abdominal tenderness. There is no guarding. Musculoskeletal:         General: No swelling or tenderness. Cervical back: Neck supple. Right lower leg: No edema. Left lower leg: No edema. Skin:     General: Skin is warm and dry. Findings: No erythema or rash. Neurological:      Mental Status: He is alert. Mental status is at baseline. Motor: No weakness. Gait: Gait normal.   Psychiatric:         Mood and Affect: Mood normal.         Behavior: Behavior normal.         Thought Content: Thought content normal.       ASSESSMENT and PLAN    Mr. Mandy Rachel has a reminder for a \"due or due soon\" health maintenance. I have asked that he contact his primary care provider for follow-up on this health maintenance. HTN - Stage II pressure, Continue norvasc 5mg po daily, benzapril 20mg po daily. Trend blood pressure. Family history of early CAD - Check Coronary calcium scan evaluate for need of aspirin or statin for long term preventative in light of family history in brother.

## 2023-09-08 RX ORDER — SULFAMETHOXAZOLE AND TRIMETHOPRIM 800; 160 MG/1; MG/1
TABLET ORAL
Qty: 20 TABLET | Refills: 0 | Status: SHIPPED | OUTPATIENT
Start: 2023-09-08

## 2023-09-08 NOTE — TELEPHONE ENCOUNTER
Message sent by patient on why re is requesting the antibiotics. Good afternoon doctor Ramandeep Winslow. I was just wondering if I can get a refill on my antibiotics. 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 And I got a bump on my leg that has gotten infected It has puss coming out of it. SULFAMETHOXAZOLE-TMP.

## 2023-09-12 DIAGNOSIS — I10 ESSENTIAL (PRIMARY) HYPERTENSION: ICD-10-CM

## 2023-09-14 ENCOUNTER — OFFICE VISIT (OUTPATIENT)
Age: 53
End: 2023-09-14

## 2023-09-14 VITALS — WEIGHT: 204 LBS | HEIGHT: 62 IN | TEMPERATURE: 98.6 F | BODY MASS INDEX: 37.54 KG/M2

## 2023-09-14 DIAGNOSIS — M17.12 UNILATERAL PRIMARY OSTEOARTHRITIS, LEFT KNEE: Primary | ICD-10-CM

## 2023-09-14 DIAGNOSIS — M17.11 UNILATERAL PRIMARY OSTEOARTHRITIS, RIGHT KNEE: ICD-10-CM

## 2023-09-14 RX ORDER — HYALURONATE SODIUM 10 MG/ML
20 SYRINGE (ML) INTRAARTICULAR ONCE
Status: COMPLETED | OUTPATIENT
Start: 2023-09-14 | End: 2023-09-14

## 2023-09-14 RX ADMIN — Medication 20 MG: at 16:04

## 2023-09-14 RX ADMIN — Medication 20 MG: at 16:11

## 2023-09-15 RX ORDER — AMLODIPINE BESYLATE AND BENAZEPRIL HYDROCHLORIDE 5; 20 MG/1; MG/1
CAPSULE ORAL
Qty: 90 CAPSULE | Refills: 1 | Status: SHIPPED | OUTPATIENT
Start: 2023-09-15

## 2023-09-21 ENCOUNTER — OFFICE VISIT (OUTPATIENT)
Age: 53
End: 2023-09-21

## 2023-09-21 VITALS — HEIGHT: 62 IN | WEIGHT: 207.2 LBS | BODY MASS INDEX: 38.13 KG/M2 | TEMPERATURE: 98.4 F

## 2023-09-21 DIAGNOSIS — M17.12 UNILATERAL PRIMARY OSTEOARTHRITIS, LEFT KNEE: ICD-10-CM

## 2023-09-21 DIAGNOSIS — M17.11 UNILATERAL PRIMARY OSTEOARTHRITIS, RIGHT KNEE: Primary | ICD-10-CM

## 2023-09-21 RX ORDER — HYALURONATE SODIUM 10 MG/ML
20 SYRINGE (ML) INTRAARTICULAR ONCE
Status: COMPLETED | OUTPATIENT
Start: 2023-09-21 | End: 2023-09-21

## 2023-09-21 RX ADMIN — Medication 20 MG: at 11:04

## 2023-09-28 ENCOUNTER — OFFICE VISIT (OUTPATIENT)
Age: 53
End: 2023-09-28

## 2023-09-28 VITALS — TEMPERATURE: 98.6 F | WEIGHT: 202 LBS | HEIGHT: 62 IN | BODY MASS INDEX: 37.17 KG/M2

## 2023-09-28 DIAGNOSIS — M17.11 UNILATERAL PRIMARY OSTEOARTHRITIS, RIGHT KNEE: Primary | ICD-10-CM

## 2023-09-28 DIAGNOSIS — M17.12 UNILATERAL PRIMARY OSTEOARTHRITIS, LEFT KNEE: ICD-10-CM

## 2023-09-28 RX ORDER — HYALURONATE SODIUM 10 MG/ML
20 SYRINGE (ML) INTRAARTICULAR ONCE
Status: COMPLETED | OUTPATIENT
Start: 2023-09-28 | End: 2023-09-28

## 2023-09-28 RX ADMIN — Medication 20 MG: at 11:35

## 2023-11-16 DIAGNOSIS — R05.1 ACUTE COUGH: Primary | ICD-10-CM

## 2023-11-16 RX ORDER — AZITHROMYCIN 250 MG/1
250 TABLET, FILM COATED ORAL SEE ADMIN INSTRUCTIONS
Qty: 6 TABLET | Refills: 0 | Status: SHIPPED | OUTPATIENT
Start: 2023-11-16 | End: 2023-11-21

## 2023-12-27 RX ORDER — SULFAMETHOXAZOLE AND TRIMETHOPRIM 800; 160 MG/1; MG/1
1 TABLET ORAL 2 TIMES DAILY
Qty: 20 TABLET | Refills: 0 | Status: SHIPPED | OUTPATIENT
Start: 2023-12-27 | End: 2024-01-06

## 2023-12-28 RX ORDER — SULFAMETHOXAZOLE AND TRIMETHOPRIM 800; 160 MG/1; MG/1
TABLET ORAL
Qty: 20 TABLET | Refills: 0 | OUTPATIENT
Start: 2023-12-28

## 2024-01-15 NOTE — PROGRESS NOTES
narrowing,  osteophytes present. Kellgren Christian  grade 4/3.    PROCEDURE: Patient, after timeout and under sterile conditions, right knee was aspirated. 25 cc  clear yellow fluid . The fluid was discarded. Patient's knees injected 4 cc 0.25% Marcaine and 0.5 cc Celestone.  Chart reviewed for the following:   Davide KAYE MD, have reviewed the History, Physical and updated the Allergic reactions for Delroy Steinsford II     TIME OUT performed immediately prior to start of procedure:  Davide KAYE MD, have performed the following reviews on Delroy Gene Julianna II prior to the start of the procedure:            * Patient was identified by name and date of birth   * Agreement on procedure being performed was verified  * Risks and Benefits explained to the patient  * Procedure site verified and marked as necessary  * Patient was positioned for comfort  * Consent was obtained  Time: 11:36 AM  Date of procedure: 1/17/2024    Procedure performed by:  Dr. Razo    Mr. Levine tolerated the procedure well with no complications.    IMPRESSION:      ICD-10-CM    1. Unilateral primary osteoarthritis, right knee  M17.11 DRAIN/INJECT LARGE JOINT/BURSA     BUPivacaine (MARCAINE) 0.5 % injection 20 mg     betamethasone acetate-betamethasone sodium phosphate (CELESTONE) injection 3 mg      2. Unilateral primary osteoarthritis, left knee  M17.12 DRAIN/INJECT LARGE JOINT/BURSA     betamethasone acetate-betamethasone sodium phosphate (CELESTONE) injection 3 mg     BUPivacaine (MARCAINE) 0.5 % injection 20 mg      3. Chronic pain of left knee  M25.562 DRAIN/INJECT LARGE JOINT/BURSA    G89.29 betamethasone acetate-betamethasone sodium phosphate (CELESTONE) injection 3 mg     BUPivacaine (MARCAINE) 0.5 % injection 20 mg      4. Chronic pain of right knee  M25.561 DRAIN/INJECT LARGE JOINT/BURSA    G89.29 BUPivacaine (MARCAINE) 0.5 % injection 20 mg     betamethasone acetate-betamethasone sodium phosphate (CELESTONE)

## 2024-01-17 ENCOUNTER — OFFICE VISIT (OUTPATIENT)
Age: 54
End: 2024-01-17

## 2024-01-17 VITALS — WEIGHT: 202 LBS | TEMPERATURE: 97.1 F | HEIGHT: 62 IN | BODY MASS INDEX: 37.17 KG/M2

## 2024-01-17 DIAGNOSIS — G89.29 CHRONIC PAIN OF LEFT KNEE: ICD-10-CM

## 2024-01-17 DIAGNOSIS — M25.461 EFFUSION, RIGHT KNEE: ICD-10-CM

## 2024-01-17 DIAGNOSIS — M25.561 CHRONIC PAIN OF RIGHT KNEE: ICD-10-CM

## 2024-01-17 DIAGNOSIS — G89.29 CHRONIC PAIN OF RIGHT KNEE: ICD-10-CM

## 2024-01-17 DIAGNOSIS — M25.562 CHRONIC PAIN OF LEFT KNEE: ICD-10-CM

## 2024-01-17 DIAGNOSIS — M17.12 UNILATERAL PRIMARY OSTEOARTHRITIS, LEFT KNEE: ICD-10-CM

## 2024-01-17 DIAGNOSIS — M17.11 UNILATERAL PRIMARY OSTEOARTHRITIS, RIGHT KNEE: Primary | ICD-10-CM

## 2024-01-17 RX ORDER — BETAMETHASONE SODIUM PHOSPHATE AND BETAMETHASONE ACETATE 3; 3 MG/ML; MG/ML
3 INJECTION, SUSPENSION INTRA-ARTICULAR; INTRALESIONAL; INTRAMUSCULAR; SOFT TISSUE ONCE
Status: COMPLETED | OUTPATIENT
Start: 2024-01-17 | End: 2024-01-17

## 2024-01-17 RX ORDER — BUPIVACAINE HYDROCHLORIDE 5 MG/ML
4 INJECTION, SOLUTION PERINEURAL ONCE
Status: COMPLETED | OUTPATIENT
Start: 2024-01-17 | End: 2024-01-17

## 2024-01-17 RX ADMIN — BUPIVACAINE HYDROCHLORIDE 20 MG: 5 INJECTION, SOLUTION PERINEURAL at 11:38

## 2024-01-17 RX ADMIN — BUPIVACAINE HYDROCHLORIDE 20 MG: 5 INJECTION, SOLUTION PERINEURAL at 11:37

## 2024-01-17 RX ADMIN — BETAMETHASONE SODIUM PHOSPHATE AND BETAMETHASONE ACETATE 3 MG: 3; 3 INJECTION, SUSPENSION INTRA-ARTICULAR; INTRALESIONAL; INTRAMUSCULAR; SOFT TISSUE at 11:36

## 2024-01-17 RX ADMIN — BETAMETHASONE SODIUM PHOSPHATE AND BETAMETHASONE ACETATE 3 MG: 3; 3 INJECTION, SUSPENSION INTRA-ARTICULAR; INTRALESIONAL; INTRAMUSCULAR; SOFT TISSUE at 11:37

## 2024-01-25 ENCOUNTER — OFFICE VISIT (OUTPATIENT)
Age: 54
End: 2024-01-25
Payer: COMMERCIAL

## 2024-01-25 VITALS — WEIGHT: 192 LBS | HEIGHT: 62 IN | BODY MASS INDEX: 35.33 KG/M2

## 2024-01-25 DIAGNOSIS — M25.561 CHRONIC PAIN OF RIGHT KNEE: ICD-10-CM

## 2024-01-25 DIAGNOSIS — M17.11 UNILATERAL PRIMARY OSTEOARTHRITIS, RIGHT KNEE: Primary | ICD-10-CM

## 2024-01-25 DIAGNOSIS — M25.461 EFFUSION OF RIGHT KNEE: ICD-10-CM

## 2024-01-25 DIAGNOSIS — G89.29 CHRONIC PAIN OF RIGHT KNEE: ICD-10-CM

## 2024-01-25 PROCEDURE — 99213 OFFICE O/P EST LOW 20 MIN: CPT | Performed by: SPECIALIST

## 2024-01-25 PROCEDURE — 20610 DRAIN/INJ JOINT/BURSA W/O US: CPT | Performed by: SPECIALIST

## 2024-01-25 RX ORDER — BUPIVACAINE HYDROCHLORIDE 5 MG/ML
4 INJECTION, SOLUTION PERINEURAL ONCE
Status: COMPLETED | OUTPATIENT
Start: 2024-01-25 | End: 2024-01-25

## 2024-01-25 RX ORDER — SULFAMETHOXAZOLE AND TRIMETHOPRIM 800; 160 MG/1; MG/1
1 TABLET ORAL 2 TIMES DAILY
Qty: 20 TABLET | Refills: 0 | OUTPATIENT
Start: 2024-01-25 | End: 2024-02-04

## 2024-01-25 RX ORDER — BETAMETHASONE SODIUM PHOSPHATE AND BETAMETHASONE ACETATE 3; 3 MG/ML; MG/ML
3 INJECTION, SUSPENSION INTRA-ARTICULAR; INTRALESIONAL; INTRAMUSCULAR; SOFT TISSUE ONCE
Status: COMPLETED | OUTPATIENT
Start: 2024-01-25 | End: 2024-01-25

## 2024-01-25 RX ADMIN — BETAMETHASONE SODIUM PHOSPHATE AND BETAMETHASONE ACETATE 3 MG: 3; 3 INJECTION, SUSPENSION INTRA-ARTICULAR; INTRALESIONAL; INTRAMUSCULAR; SOFT TISSUE at 16:25

## 2024-01-25 RX ADMIN — BUPIVACAINE HYDROCHLORIDE 20 MG: 5 INJECTION, SOLUTION PERINEURAL at 16:26

## 2024-01-25 NOTE — PROGRESS NOTES
9/30/22 KEYONNA  IMPRESSION:  Three views with bilateral knees on AP view - No fractures,  effusion, severe medial R and moderately severe medial L joint space narrowing,  osteophytes present. Kellgren Christian  grade 4/3.    PROCEDURE: After discussing treatment options, patient's right knee was reaspirated 35 cc  clear yellow  fluid, followed by injection with 4 cc Marcaine and 1/2 cc Celestone.    Chart reviewed for the following:   Davide KAYE MD, have reviewed the History, Physical and updated the Allergic reactions for Delroy Gene Julianna II     TIME OUT performed immediately prior to start of procedure:  Davide KAYE MD, have performed the following reviews on Delroy Gene Julianna II prior to the start of the procedure:            * Patient was identified by name and date of birth   * Agreement on procedure being performed was verified  * Risks and Benefits explained to the patient  * Procedure site verified and marked as necessary  * Patient was positioned for comfort  * Consent was obtained  Time: 4:18 PM  Date of procedure: 1/25/2024    Procedure performed by:  Dr. Razo    Mr. Levine tolerated the procedure well with no complications.    IMPRESSION:      ICD-10-CM    1. Unilateral primary osteoarthritis, right knee  M17.11 DRAIN/INJECT LARGE JOINT/BURSA     BUPivacaine (MARCAINE) 0.5 % injection 20 mg     betamethasone acetate-betamethasone sodium phosphate (CELESTONE) injection 3 mg      2. Effusion of right knee  M25.461 DRAIN/INJECT LARGE JOINT/BURSA      3. Chronic pain of right knee  M25.561 DRAIN/INJECT LARGE JOINT/BURSA    G89.29 BUPivacaine (MARCAINE) 0.5 % injection 20 mg     betamethasone acetate-betamethasone sodium phosphate (CELESTONE) injection 3 mg        PLAN:  Advised for TKR pending weight loss. Dietary counseling provided today. Start weight loss with low carb diet and intermittent fasting. After discussing treatment options, patient's right knee was aspirated 35 cc

## 2024-01-30 ENCOUNTER — TELEMEDICINE (OUTPATIENT)
Facility: CLINIC | Age: 54
End: 2024-01-30
Payer: COMMERCIAL

## 2024-01-30 DIAGNOSIS — L03.90 CELLULITIS, UNSPECIFIED CELLULITIS SITE: Primary | ICD-10-CM

## 2024-01-30 DIAGNOSIS — I10 ESSENTIAL (PRIMARY) HYPERTENSION: ICD-10-CM

## 2024-01-30 PROCEDURE — 99213 OFFICE O/P EST LOW 20 MIN: CPT | Performed by: FAMILY MEDICINE

## 2024-01-30 RX ORDER — AMOXICILLIN AND CLAVULANATE POTASSIUM 875; 125 MG/1; MG/1
1 TABLET, FILM COATED ORAL 2 TIMES DAILY
Qty: 20 TABLET | Refills: 0 | Status: SHIPPED | OUTPATIENT
Start: 2024-01-30 | End: 2024-02-09

## 2024-01-30 ASSESSMENT — PATIENT HEALTH QUESTIONNAIRE - PHQ9
1. LITTLE INTEREST OR PLEASURE IN DOING THINGS: 0
SUM OF ALL RESPONSES TO PHQ QUESTIONS 1-9: 0
2. FEELING DOWN, DEPRESSED OR HOPELESS: 0
SUM OF ALL RESPONSES TO PHQ QUESTIONS 1-9: 0
SUM OF ALL RESPONSES TO PHQ9 QUESTIONS 1 & 2: 0

## 2024-01-30 ASSESSMENT — ENCOUNTER SYMPTOMS
NAUSEA: 0
RESPIRATORY NEGATIVE: 1
COUGH: 0
VOMITING: 0

## 2024-01-30 NOTE — PROGRESS NOTES
Delroy Levine II, was evaluated through a synchronous (real-time) audio-video encounter. The patient (or guardian if applicable) is aware that this is a billable service, which includes applicable co-pays. This Virtual Visit was conducted with patient's (and/or legal guardian's) consent. Patient identification was verified, and a caregiver was present when appropriate.   The patient was located at Home: [address] [facility] [address]  Provider was located at Facility (Appt Dept): [id number] [place] [address]  [address]  [place] [address]    Delroy Levine II (:  1970) is a Established patient, presenting virtually for evaluation of the following:    Assessment & Plan   Below is the assessment and plan developed based on review of pertinent history, physical exam, labs, studies, and medications.  1. Cellulitis, unspecified cellulitis site  -     amoxicillin-clavulanate (AUGMENTIN) 875-125 MG per tablet; Take 1 tablet by mouth 2 times daily for 10 days, Disp-20 tablet, R-0Normal  2. Essential (primary) hypertension    Will increase lotrel to 2 caps a day and follow up in 2 days for BP check    No follow-ups on file.       Subjective   HPI  Review of Systems   Constitutional: Negative.  Negative for fever.   Respiratory: Negative.  Negative for cough.    Cardiovascular:  Negative for chest pain.   Gastrointestinal:  Negative for nausea and vomiting.   Neurological: Negative.  Negative for weakness.   Psychiatric/Behavioral: Negative.  Negative for behavioral problems and suicidal ideas.           Objective   Patient-Reported Vitals  BP Observations: No, remote/electronic monitoring device was not used or able to be verified  Patient-Reported Weight: 192lb  Patient-Reported Height: 5'2       Physical Exam  [INSTRUCTIONS:  \"[x]\" Indicates a positive item  \"[]\" Indicates a negative item  -- DELETE ALL ITEMS NOT EXAMINED]    Constitutional: [x] Appears well-developed and well-nourished [x]

## 2024-02-19 ENCOUNTER — TELEPHONE (OUTPATIENT)
Facility: CLINIC | Age: 54
End: 2024-02-19

## 2024-02-19 DIAGNOSIS — I10 ESSENTIAL (PRIMARY) HYPERTENSION: ICD-10-CM

## 2024-02-19 RX ORDER — AMLODIPINE BESYLATE AND BENAZEPRIL HYDROCHLORIDE 5; 20 MG/1; MG/1
CAPSULE ORAL
Qty: 180 CAPSULE | Refills: 0 | Status: SHIPPED | OUTPATIENT
Start: 2024-02-19

## 2024-02-19 NOTE — TELEPHONE ENCOUNTER
Pt's wife called stating that after his most recent visit  instructed him to double up on his bp medication. She states this has been keeping his bp in check but has caused him to run out of the medication faster than he is supposed to. She would like to know if the medication sig can be updated to reflect this or if the medication can be upped to 10 mg instead of 5. Please review and advise as soon as possible.

## 2024-04-17 ENCOUNTER — OFFICE VISIT (OUTPATIENT)
Facility: CLINIC | Age: 54
End: 2024-04-17
Payer: COMMERCIAL

## 2024-04-17 VITALS
OXYGEN SATURATION: 95 % | WEIGHT: 198.25 LBS | SYSTOLIC BLOOD PRESSURE: 150 MMHG | DIASTOLIC BLOOD PRESSURE: 86 MMHG | BODY MASS INDEX: 36.26 KG/M2 | TEMPERATURE: 96.9 F | RESPIRATION RATE: 16 BRPM | HEART RATE: 87 BPM

## 2024-04-17 DIAGNOSIS — E11.628 TYPE 2 DIABETES MELLITUS WITH OTHER SKIN COMPLICATION, WITHOUT LONG-TERM CURRENT USE OF INSULIN (HCC): ICD-10-CM

## 2024-04-17 DIAGNOSIS — L03.317 CELLULITIS OF BUTTOCK: Primary | ICD-10-CM

## 2024-04-17 DIAGNOSIS — L73.2 HIDRADENITIS SUPPURATIVA: ICD-10-CM

## 2024-04-17 LAB — HBA1C MFR BLD: 7.8 %

## 2024-04-17 PROCEDURE — 3077F SYST BP >= 140 MM HG: CPT | Performed by: STUDENT IN AN ORGANIZED HEALTH CARE EDUCATION/TRAINING PROGRAM

## 2024-04-17 PROCEDURE — 3079F DIAST BP 80-89 MM HG: CPT | Performed by: STUDENT IN AN ORGANIZED HEALTH CARE EDUCATION/TRAINING PROGRAM

## 2024-04-17 PROCEDURE — 83036 HEMOGLOBIN GLYCOSYLATED A1C: CPT | Performed by: STUDENT IN AN ORGANIZED HEALTH CARE EDUCATION/TRAINING PROGRAM

## 2024-04-17 PROCEDURE — 99214 OFFICE O/P EST MOD 30 MIN: CPT | Performed by: STUDENT IN AN ORGANIZED HEALTH CARE EDUCATION/TRAINING PROGRAM

## 2024-04-17 RX ORDER — DOXYCYCLINE HYCLATE 100 MG
100 TABLET ORAL 2 TIMES DAILY
Qty: 20 TABLET | Refills: 0 | Status: SHIPPED | OUTPATIENT
Start: 2024-04-17 | End: 2024-04-17

## 2024-04-17 RX ORDER — METFORMIN HYDROCHLORIDE 500 MG/1
500 TABLET, EXTENDED RELEASE ORAL
Qty: 180 TABLET | Refills: 1 | Status: SHIPPED | OUTPATIENT
Start: 2024-04-17 | End: 2024-04-17

## 2024-04-17 RX ORDER — CLINDAMYCIN PHOSPHATE 10 MG/G
GEL TOPICAL
Qty: 75 ML | Refills: 1 | Status: SHIPPED | OUTPATIENT
Start: 2024-04-17 | End: 2024-04-17

## 2024-04-17 RX ORDER — METFORMIN HYDROCHLORIDE 500 MG/1
500 TABLET, EXTENDED RELEASE ORAL
Qty: 180 TABLET | Refills: 1 | Status: SHIPPED | OUTPATIENT
Start: 2024-04-17

## 2024-04-17 RX ORDER — DOXYCYCLINE HYCLATE 100 MG
100 TABLET ORAL 2 TIMES DAILY
Qty: 28 TABLET | Refills: 0 | Status: SHIPPED | OUTPATIENT
Start: 2024-04-17 | End: 2024-05-01

## 2024-04-17 RX ORDER — CLINDAMYCIN PHOSPHATE 10 MG/G
GEL TOPICAL
Qty: 75 ML | Refills: 1 | Status: SHIPPED | OUTPATIENT
Start: 2024-04-17 | End: 2024-04-24

## 2024-04-17 NOTE — PROGRESS NOTES
\"Have you been to the ER, urgent care clinic since your last visit?  Hospitalized since your last visit?\"    NO    “Have you seen or consulted any other health care providers outside of  since your last visit?”    NO    Chief Complaint   Patient presents with    boil     Boil on the buttocks for almost 2 weeks now.                 Click Here for Release of Records Request

## 2024-04-17 NOTE — PROGRESS NOTES
Delroy Emery [facility] (: 1970) is a 53 y.o. male, established patient, here for evaluation of the following chief complaint(s):  boil (Boil on the buttocks for almost 2 weeks now.)       Assessment/Plan:  1. Cellulitis of buttock  No palpable abscess.  Suspect will be able to eradicate this with oral antibiotics.  Doxycycline twice a day for 14 days  - doxycycline hyclate (VIBRA-TABS) 100 MG tablet; Take 1 tablet by mouth 2 times daily for 14 days  Dispense: 28 tablet; Refill: 0    2. Hidradenitis suppurativa  Clinical course most consistent with HS. would recommend evaluation by dermatology for long-term management.  Discrete course of doxycycline as above.  Clindamycin for future recurrence at the early stages  - Amb External Referral To Dermatology  - clindamycin (CLEOCIN-T) 1 % gel; Apply topically 2 times daily due infected areas until resolved.  Dispense: 75 mL; Refill: 1  - doxycycline hyclate (VIBRA-TABS) 100 MG tablet; Take 1 tablet by mouth 2 times daily for 14 days  Dispense: 28 tablet; Refill: 0    3. Type 2 diabetes mellitus with other skin complication, without long-term current use of insulin (HCC)  Diagnosis technically about a year ago, but nearing information to patient.  Discussed briefly the pathophysiology of diabetes.  Start metformin as prescribed for both benefit of his diabetes and HS.  - AMB POC HEMOGLOBIN A1C  - metFORMIN (GLUCOPHAGE-XR) 500 MG extended release tablet; Take 1 tablet by mouth daily (with breakfast) After 2 weeks, increase to 2 tablets once a day  Dispense: 180 tablet; Refill: 1      Return in about 3 months (around 2024) for diabetes management.      Subjective/Objective:  HPI    Boil  - some recurrent ones over the years. Nothing over the last few months.   - How now one present for 2 weeks.       T2DM  -Patient was unaware of diagnosis.  A1c from 2023 from the hospitalization showed an A1c of 7.1        Physical Exam  Blood pressure (!) 150/86, pulse 87,

## 2024-05-08 ENCOUNTER — TELEPHONE (OUTPATIENT)
Facility: CLINIC | Age: 54
End: 2024-05-08

## 2024-05-08 NOTE — TELEPHONE ENCOUNTER
Fax received from Cover  meds stating prior auth is required for the Clindamycin Phosphate 1% Gel  Submit a PA request  1. Go to [email or web address] and click \"Enter a Key\"  2. Patient last name: Julianna      : 1970      Key: [id number]  3. Click \"start a PA\", complete the form, and \"send to plan\"

## 2024-05-30 ENCOUNTER — TELEPHONE (OUTPATIENT)
Facility: CLINIC | Age: 54
End: 2024-05-30

## 2024-05-30 DIAGNOSIS — I10 ESSENTIAL (PRIMARY) HYPERTENSION: ICD-10-CM

## 2024-05-30 RX ORDER — AMLODIPINE BESYLATE AND BENAZEPRIL HYDROCHLORIDE 5; 20 MG/1; MG/1
CAPSULE ORAL
Qty: 180 CAPSULE | Refills: 0 | Status: SHIPPED | OUTPATIENT
Start: 2024-05-30

## 2024-05-30 NOTE — TELEPHONE ENCOUNTER
This patient contacted office for the following prescriptions to be filled:    Medication requested : amLODIPine-benazepril (LOTREL) 5-20 MG per capsule   PCP: Khadar   Pharmacy or Print: Pharmacy  Mail order or Local pharmacy Local (  [facility] [id number] - [place], [place] - [address] [facility]       Scheduled appointment if not seen by current providers in office: LOV: 24 [id number]

## 2024-06-14 DIAGNOSIS — L03.317 CELLULITIS OF BUTTOCK: ICD-10-CM

## 2024-06-14 DIAGNOSIS — L73.2 HIDRADENITIS SUPPURATIVA: ICD-10-CM

## 2024-06-17 ENCOUNTER — TELEPHONE (OUTPATIENT)
Facility: CLINIC | Age: 54
End: 2024-06-17

## 2024-06-17 NOTE — TELEPHONE ENCOUNTER
Fax received from Cover  meds stating prior auth is required for the   clindamycin (CLEOCIN-T) 1 % gel Submit a PA request  1. Go to [email or web address] and click \"Enter a Key\"  2. Patient last name:Julianna      :1970      Key: [id number]  3. Click \"start a PA\", complete the form, and \"send to plan\"

## 2024-06-18 NOTE — TELEPHONE ENCOUNTER
Unable to find this on covermymeds and unable to start a new PA due to not being able to pull up this medication by name. Is this on your covermymeds? I see Earl prescribed the medication so unsure if they sent the request to you

## 2024-06-19 RX ORDER — DOXYCYCLINE HYCLATE 100 MG
TABLET ORAL
Qty: 28 TABLET | Refills: 0 | OUTPATIENT
Start: 2024-06-19

## 2024-07-03 ENCOUNTER — TELEPHONE (OUTPATIENT)
Facility: CLINIC | Age: 54
End: 2024-07-03

## 2024-07-03 NOTE — TELEPHONE ENCOUNTER
Please see TE from 06/17. Same issue with PA   Hemigard Postcare Instructions: The HEMIGARD strips are to remain completely dry for at least 5-7 days.

## 2024-07-03 NOTE — TELEPHONE ENCOUNTER
Fax received from Cover  meds stating prior auth is required for the   clindamycin (CLEOCIN-T) 1 % gel .  Submit a PA request  1. Go to [email or web address] and click \"Enter a Key\"  2. Patient last name: Julianna      : 1970      Key: [id number]  3. Click \"start a PA\", complete the form, and \"send to plan\"

## 2024-07-29 ENCOUNTER — HOSPITAL ENCOUNTER (OUTPATIENT)
Facility: HOSPITAL | Age: 54
Setting detail: SPECIMEN
Discharge: HOME | End: 2024-08-01
Payer: COMMERCIAL

## 2024-07-29 ENCOUNTER — OFFICE VISIT (OUTPATIENT)
Facility: CLINIC | Age: 54
End: 2024-07-29
Payer: COMMERCIAL

## 2024-07-29 VITALS
RESPIRATION RATE: 16 BRPM | SYSTOLIC BLOOD PRESSURE: 126 MMHG | WEIGHT: 196.5 LBS | OXYGEN SATURATION: 98 % | TEMPERATURE: 98.4 F | BODY MASS INDEX: 35.94 KG/M2 | DIASTOLIC BLOOD PRESSURE: 82 MMHG | HEART RATE: 85 BPM

## 2024-07-29 DIAGNOSIS — I10 ESSENTIAL (PRIMARY) HYPERTENSION: ICD-10-CM

## 2024-07-29 DIAGNOSIS — L73.2 HIDRADENITIS SUPPURATIVA: Primary | ICD-10-CM

## 2024-07-29 DIAGNOSIS — E11.628 TYPE 2 DIABETES MELLITUS WITH OTHER SKIN COMPLICATION, WITHOUT LONG-TERM CURRENT USE OF INSULIN (HCC): ICD-10-CM

## 2024-07-29 LAB
ALBUMIN SERPL-MCNC: 4.1 G/DL (ref 3.4–5)
ALBUMIN/GLOB SERPL: 1.6 (ref 0.8–1.7)
ALP SERPL-CCNC: 78 U/L (ref 45–117)
ALT SERPL-CCNC: 35 U/L (ref 16–61)
ANION GAP SERPL CALC-SCNC: 7 MMOL/L (ref 3–18)
AST SERPL-CCNC: 15 U/L (ref 10–38)
BILIRUB SERPL-MCNC: 0.5 MG/DL (ref 0.2–1)
BUN SERPL-MCNC: 22 MG/DL (ref 7–18)
BUN/CREAT SERPL: 19 (ref 12–20)
CALCIUM SERPL-MCNC: 9.4 MG/DL (ref 8.5–10.1)
CHLORIDE SERPL-SCNC: 110 MMOL/L (ref 100–111)
CHOLEST SERPL-MCNC: 197 MG/DL
CO2 SERPL-SCNC: 25 MMOL/L (ref 21–32)
CREAT SERPL-MCNC: 1.16 MG/DL (ref 0.6–1.3)
CREAT UR-MCNC: 199 MG/DL (ref 30–125)
ERYTHROCYTE [DISTWIDTH] IN BLOOD BY AUTOMATED COUNT: 13 % (ref 11.6–14.5)
GLOBULIN SER CALC-MCNC: 2.6 G/DL (ref 2–4)
GLUCOSE SERPL-MCNC: 154 MG/DL (ref 74–99)
HBA1C MFR BLD: 7.8 %
HCT VFR BLD AUTO: 41.7 % (ref 36–48)
HDLC SERPL-MCNC: 38 MG/DL (ref 40–60)
HDLC SERPL: 5.2 (ref 0–5)
HGB BLD-MCNC: 13.4 G/DL (ref 13–16)
LDLC SERPL CALC-MCNC: 113 MG/DL (ref 0–100)
LIPID PANEL: ABNORMAL
MCH RBC QN AUTO: 27 PG (ref 24–34)
MCHC RBC AUTO-ENTMCNC: 32.1 G/DL (ref 31–37)
MCV RBC AUTO: 84.1 FL (ref 78–100)
MICROALBUMIN UR-MCNC: 1.58 MG/DL (ref 0–3)
MICROALBUMIN/CREAT UR-RTO: 8 MG/G (ref 0–30)
NRBC # BLD: 0 K/UL (ref 0–0.01)
NRBC BLD-RTO: 0 PER 100 WBC
PLATELET # BLD AUTO: 213 K/UL (ref 135–420)
PMV BLD AUTO: 10.9 FL (ref 9.2–11.8)
POTASSIUM SERPL-SCNC: 4.3 MMOL/L (ref 3.5–5.5)
PROT SERPL-MCNC: 6.7 G/DL (ref 6.4–8.2)
RBC # BLD AUTO: 4.96 M/UL (ref 4.35–5.65)
SODIUM SERPL-SCNC: 142 MMOL/L (ref 136–145)
TRIGL SERPL-MCNC: 230 MG/DL
VLDLC SERPL CALC-MCNC: 46 MG/DL
WBC # BLD AUTO: 5.5 K/UL (ref 4.6–13.2)

## 2024-07-29 PROCEDURE — 80061 LIPID PANEL: CPT

## 2024-07-29 PROCEDURE — 3074F SYST BP LT 130 MM HG: CPT | Performed by: STUDENT IN AN ORGANIZED HEALTH CARE EDUCATION/TRAINING PROGRAM

## 2024-07-29 PROCEDURE — 83036 HEMOGLOBIN GLYCOSYLATED A1C: CPT | Performed by: STUDENT IN AN ORGANIZED HEALTH CARE EDUCATION/TRAINING PROGRAM

## 2024-07-29 PROCEDURE — 3079F DIAST BP 80-89 MM HG: CPT | Performed by: STUDENT IN AN ORGANIZED HEALTH CARE EDUCATION/TRAINING PROGRAM

## 2024-07-29 PROCEDURE — 82570 ASSAY OF URINE CREATININE: CPT

## 2024-07-29 PROCEDURE — 80053 COMPREHEN METABOLIC PANEL: CPT

## 2024-07-29 PROCEDURE — 82043 UR ALBUMIN QUANTITATIVE: CPT

## 2024-07-29 PROCEDURE — 99214 OFFICE O/P EST MOD 30 MIN: CPT | Performed by: STUDENT IN AN ORGANIZED HEALTH CARE EDUCATION/TRAINING PROGRAM

## 2024-07-29 PROCEDURE — 85027 COMPLETE CBC AUTOMATED: CPT

## 2024-07-29 RX ORDER — AMLODIPINE BESYLATE AND BENAZEPRIL HYDROCHLORIDE 5; 20 MG/1; MG/1
2 CAPSULE ORAL DAILY
Qty: 180 CAPSULE | Refills: 3 | Status: SHIPPED | OUTPATIENT
Start: 2024-07-29

## 2024-07-29 RX ORDER — METFORMIN HYDROCHLORIDE 500 MG/1
1000 TABLET, EXTENDED RELEASE ORAL 2 TIMES DAILY WITH MEALS
Qty: 360 TABLET | Refills: 1 | Status: SHIPPED | OUTPATIENT
Start: 2024-07-29

## 2024-07-29 NOTE — PROGRESS NOTES
Delroy Steinsford VICTORINO (: 1970) is a 54 y.o. male, established patient, here for evaluation of the following chief complaint(s):  3 Month Follow-Up (No concerns)        Assessment & Plan  1. HS.  Pt given info again for derm referral. Recommend calling to schedule. The potential link between elevated blood glucose levels and skin infections was discussed.    2. Diabetes Mellitus.  His A1c level remains unchanged at 7.8. The dosage of metformin will be increased to 2 tablets twice daily. He was advised to adhere to a diabetic diet, focusing on carbohydrate moderation. Comprehensive blood work, including cholesterol and kidney function tests, will be conducted today. If his A1c level remains unchanged following the metformin dosage increase, alternative medications will be considered. He reports no side effects from the current metformin regimen.    3. Hypertension.  His blood pressure readings have shown improvement, with a current reading of 126/82. He will continue with his current antihypertensive medication regimen. Refill sent    Results  Laboratory Studies  A1c is 7.8.  1. Type 2 diabetes mellitus with other skin complication, without long-term current use of insulin (HCC)  -     AMB POC HEMOGLOBIN A1C  -     metFORMIN (GLUCOPHAGE-XR) 500 MG extended release tablet; Take 2 tablets by mouth with breakfast and with evening meal, Disp-360 tablet, R-1Normal  -     Lipid Panel; Future  -     Comprehensive Metabolic Panel; Future  -     CBC; Future  -     Microalbumin / Creatinine Urine Ratio; Future  2. Hidradenitis suppurativa  3. Essential (primary) hypertension  -     amLODIPine-benazepril (LOTREL) 5-20 MG per capsule; Take 2 capsules by mouth daily, Disp-180 capsule, R-3Normal    Return in about 3 months (around 10/29/2024) for diabetes management.         Subjective   History of Present Illness  The patient presents for evaluation of multiple medical concerns.    He reports the development of two new

## 2024-07-29 NOTE — PROGRESS NOTES
Chief Complaint   Patient presents with    3 Month Follow-Up     No concerns       1. \"Have you been to the ER, urgent care clinic since your last visit?  Hospitalized since your last visit?\" No    2. \"Have you seen or consulted any other health care providers outside of the [facility] since your last visit?\" No     3. For patients aged 45-75: Has the patient had a colonoscopy / FIT/ Cologuard? Yes - no Care Gap present

## 2024-08-19 NOTE — TELEPHONE ENCOUNTER
Care Transitions Note      Return call not received.  This ACM to sign off and close program.   Catherine Ford called in regards to message above. Informed her that Olegario Wilderelvis is not in the office but another message could be routed back for his review. She expressed understanding but also expressed concern as it is becoming more and more painful. Please review and advise as needed.

## 2024-09-26 ENCOUNTER — OFFICE VISIT (OUTPATIENT)
Age: 54
End: 2024-09-26

## 2024-09-26 VITALS — HEIGHT: 62 IN | BODY MASS INDEX: 35.7 KG/M2 | TEMPERATURE: 98 F | WEIGHT: 194 LBS

## 2024-09-26 DIAGNOSIS — M17.11 UNILATERAL PRIMARY OSTEOARTHRITIS, RIGHT KNEE: Primary | ICD-10-CM

## 2024-09-26 DIAGNOSIS — M25.562 CHRONIC PAIN OF LEFT KNEE: ICD-10-CM

## 2024-09-26 DIAGNOSIS — G89.29 CHRONIC PAIN OF LEFT KNEE: ICD-10-CM

## 2024-09-26 DIAGNOSIS — M17.12 UNILATERAL PRIMARY OSTEOARTHRITIS, LEFT KNEE: ICD-10-CM

## 2024-09-26 DIAGNOSIS — G89.29 CHRONIC PAIN OF RIGHT KNEE: ICD-10-CM

## 2024-09-26 DIAGNOSIS — M25.561 CHRONIC PAIN OF RIGHT KNEE: ICD-10-CM

## 2024-09-26 RX ORDER — BETAMETHASONE SODIUM PHOSPHATE AND BETAMETHASONE ACETATE 3; 3 MG/ML; MG/ML
3 INJECTION, SUSPENSION INTRA-ARTICULAR; INTRALESIONAL; INTRAMUSCULAR; SOFT TISSUE ONCE
Status: COMPLETED | OUTPATIENT
Start: 2024-09-26 | End: 2024-09-26

## 2024-09-26 RX ORDER — AMLODIPINE AND OLMESARTAN MEDOXOMIL 5; 20 MG/1; MG/1
1 TABLET ORAL DAILY
Qty: 90 TABLET | OUTPATIENT
Start: 2024-09-26

## 2024-09-26 RX ORDER — BUPIVACAINE HYDROCHLORIDE 5 MG/ML
4 INJECTION, SOLUTION PERINEURAL ONCE
Status: COMPLETED | OUTPATIENT
Start: 2024-09-26 | End: 2024-09-26

## 2024-09-26 RX ADMIN — BETAMETHASONE SODIUM PHOSPHATE AND BETAMETHASONE ACETATE 3 MG: 3; 3 INJECTION, SUSPENSION INTRA-ARTICULAR; INTRALESIONAL; INTRAMUSCULAR; SOFT TISSUE at 16:30

## 2024-09-26 RX ADMIN — BUPIVACAINE HYDROCHLORIDE 20 MG: 5 INJECTION, SOLUTION PERINEURAL at 16:30

## 2024-09-26 RX ADMIN — BUPIVACAINE HYDROCHLORIDE 20 MG: 5 INJECTION, SOLUTION PERINEURAL at 16:29

## 2024-10-22 RX ORDER — AMLODIPINE AND OLMESARTAN MEDOXOMIL 5; 20 MG/1; MG/1
1 TABLET ORAL DAILY
Qty: 90 TABLET | OUTPATIENT
Start: 2024-10-22

## 2024-10-22 NOTE — TELEPHONE ENCOUNTER
Called pharmacy and spoke to the pharmacist and she informed me that she will deactivate this Script since he has the one in for Amlodipine-benapril.

## 2024-10-23 DIAGNOSIS — E11.628 TYPE 2 DIABETES MELLITUS WITH OTHER SKIN COMPLICATION, WITHOUT LONG-TERM CURRENT USE OF INSULIN (HCC): ICD-10-CM

## 2024-10-23 RX ORDER — METFORMIN HYDROCHLORIDE 500 MG/1
1000 TABLET, EXTENDED RELEASE ORAL 2 TIMES DAILY WITH MEALS
Qty: 360 TABLET | Refills: 3 | Status: SHIPPED | OUTPATIENT
Start: 2024-10-23

## 2024-10-29 ENCOUNTER — OFFICE VISIT (OUTPATIENT)
Facility: CLINIC | Age: 54
End: 2024-10-29
Payer: COMMERCIAL

## 2024-10-29 VITALS
TEMPERATURE: 98.4 F | HEART RATE: 73 BPM | BODY MASS INDEX: 35.66 KG/M2 | HEIGHT: 62 IN | DIASTOLIC BLOOD PRESSURE: 80 MMHG | WEIGHT: 193.8 LBS | SYSTOLIC BLOOD PRESSURE: 127 MMHG | OXYGEN SATURATION: 97 %

## 2024-10-29 DIAGNOSIS — L73.2 HIDRADENITIS SUPPURATIVA: ICD-10-CM

## 2024-10-29 DIAGNOSIS — E11.628 TYPE 2 DIABETES MELLITUS WITH OTHER SKIN COMPLICATION, WITHOUT LONG-TERM CURRENT USE OF INSULIN (HCC): Primary | ICD-10-CM

## 2024-10-29 PROBLEM — E11.9 DIABETES MELLITUS (HCC): Status: ACTIVE | Noted: 2024-10-29

## 2024-10-29 LAB — HBA1C MFR BLD: 7.3 %

## 2024-10-29 PROCEDURE — 3079F DIAST BP 80-89 MM HG: CPT | Performed by: STUDENT IN AN ORGANIZED HEALTH CARE EDUCATION/TRAINING PROGRAM

## 2024-10-29 PROCEDURE — 83036 HEMOGLOBIN GLYCOSYLATED A1C: CPT | Performed by: STUDENT IN AN ORGANIZED HEALTH CARE EDUCATION/TRAINING PROGRAM

## 2024-10-29 PROCEDURE — 3074F SYST BP LT 130 MM HG: CPT | Performed by: STUDENT IN AN ORGANIZED HEALTH CARE EDUCATION/TRAINING PROGRAM

## 2024-10-29 PROCEDURE — 99214 OFFICE O/P EST MOD 30 MIN: CPT | Performed by: STUDENT IN AN ORGANIZED HEALTH CARE EDUCATION/TRAINING PROGRAM

## 2024-10-29 RX ORDER — DOXYCYCLINE 100 MG/1
100 CAPSULE ORAL DAILY
Qty: 90 CAPSULE | Refills: 0 | Status: SHIPPED | OUTPATIENT
Start: 2024-10-29

## 2024-10-29 RX ORDER — MUPIROCIN 20 MG/G
OINTMENT TOPICAL
Qty: 30 G | Refills: 2 | Status: SHIPPED | OUTPATIENT
Start: 2024-10-29 | End: 2024-11-05

## 2024-10-29 SDOH — ECONOMIC STABILITY: FOOD INSECURITY: WITHIN THE PAST 12 MONTHS, YOU WORRIED THAT YOUR FOOD WOULD RUN OUT BEFORE YOU GOT MONEY TO BUY MORE.: NEVER TRUE

## 2024-10-29 SDOH — ECONOMIC STABILITY: FOOD INSECURITY: WITHIN THE PAST 12 MONTHS, THE FOOD YOU BOUGHT JUST DIDN'T LAST AND YOU DIDN'T HAVE MONEY TO GET MORE.: NEVER TRUE

## 2024-10-29 SDOH — ECONOMIC STABILITY: INCOME INSECURITY: HOW HARD IS IT FOR YOU TO PAY FOR THE VERY BASICS LIKE FOOD, HOUSING, MEDICAL CARE, AND HEATING?: NOT HARD AT ALL

## 2024-10-29 ASSESSMENT — PATIENT HEALTH QUESTIONNAIRE - PHQ9
1. LITTLE INTEREST OR PLEASURE IN DOING THINGS: NOT AT ALL
SUM OF ALL RESPONSES TO PHQ QUESTIONS 1-9: 0
SUM OF ALL RESPONSES TO PHQ9 QUESTIONS 1 & 2: 0
2. FEELING DOWN, DEPRESSED OR HOPELESS: NOT AT ALL

## 2024-10-29 NOTE — PROGRESS NOTES
Delroy Huttonford VICTORINO (: 1970) is a 54 y.o. male, established patient, here for evaluation of the following chief complaint(s):  Follow-up (Diabetes Management. )        Assessment & Plan  - Recurrent boils/HS:    - Current active boil below the waist    - Uses Mupirocin cream and Band-Aid for early symptoms    - Larger boils have required antibiotics    - Prescribed doxycycline for daily use over the next few months, pending response will continue or wean    - Prescribed mupirocin cream for flare-ups    - Discussed potential side effects of long-term Doxy use, including sun sensitivity    - Reassured boils are not contagious    - Diabetes Mellitus:    - Blood sugar levels improving    - Recent A1C: 7.3 (down from 7.8)    - Goal: Maintain levels below 7    - On maximum dose of metformin    - Discussed impact of carbohydrates on blood sugar    - Advised dietary modifications, increased physical activity, and weight management    - Prescribed Ozempic for blood sugar management and weight loss    - Discussed potential side effects and advised gradual dose increase    - [facility] [place]    - Follow-up in 3 months    Results  - Blood sugar level: 7.3  1. Type 2 diabetes mellitus with other skin complication, without long-term current use of insulin (HCC)  -     AMB POC HEMOGLOBIN A1C  -     Semaglutide,0.25 or 0.5MG/DOS, 2 MG/3ML SOPN; Inject 0.25mg into the skin weekly for 4 weeks, then increase to 0.5mg weekly, Disp-3 mL, R-0Normal  2. Hidradenitis suppurativa  -     doxycycline monohydrate (MONODOX) 100 MG capsule; Take 1 capsule by mouth daily, Disp-90 capsule, R-0Normal  -     mupirocin (BACTROBAN) 2 % ointment; Apply topically 3 times daily., Disp-30 g, R-2, Normal    Return in about 3 months (around 2025) for diabetes management, skin infections.         Subjective   History of Present Illness  The patient presents with recurrent boils, primarily below the waist,

## 2024-10-29 NOTE — PROGRESS NOTES
Chief Complaint   Patient presents with    Follow-up     Diabetes Management.        \"Have you been to the ER, urgent care clinic since your last visit?  Hospitalized since your last visit?\"    NO    “Have you seen or consulted any other health care providers outside our system since your last visit?”    YES - When: approximately 2 months ago.  Where and Why: Orthopedics- knee pain.      “Have you had a diabetic eye exam?”    NO     No diabetic eye exam on file

## 2024-11-19 ENCOUNTER — OFFICE VISIT (OUTPATIENT)
Facility: CLINIC | Age: 54
End: 2024-11-19

## 2024-11-19 VITALS
TEMPERATURE: 98.1 F | RESPIRATION RATE: 16 BRPM | HEART RATE: 87 BPM | WEIGHT: 184.38 LBS | SYSTOLIC BLOOD PRESSURE: 130 MMHG | OXYGEN SATURATION: 96 % | BODY MASS INDEX: 33.72 KG/M2 | DIASTOLIC BLOOD PRESSURE: 84 MMHG

## 2024-11-19 DIAGNOSIS — M70.22 OLECRANON BURSITIS OF LEFT ELBOW: Primary | ICD-10-CM

## 2024-11-19 DIAGNOSIS — E11.628 TYPE 2 DIABETES MELLITUS WITH OTHER SKIN COMPLICATION, WITHOUT LONG-TERM CURRENT USE OF INSULIN (HCC): ICD-10-CM

## 2024-11-19 RX ORDER — TRIAMCINOLONE ACETONIDE 40 MG/ML
20 INJECTION, SUSPENSION INTRA-ARTICULAR; INTRAMUSCULAR ONCE
Status: SHIPPED | OUTPATIENT
Start: 2024-11-19

## 2024-11-19 NOTE — PROGRESS NOTES
\"Have you been to the ER, urgent care clinic since your last visit?  Hospitalized since your last visit?\"    NO    “Have you seen or consulted any other health care providers outside our system since your last visit?”    NO      “Have you had a diabetic eye exam?”    NO     No diabetic eye exam on file            
for the following:   Dino KAYE MD, have reviewed the History, Physical and updated the Allergic reactions for Delroy Levine II     TIME OUT performed immediately prior to start of procedure:  iDno KAYE MD, have performed the following reviews on Delroy Levien II prior to the start of the procedure:            * Patient was identified by name and date of birth   * Agreement on procedure being performed was verified  * Risks and Benefits explained to the patient  * Procedure site verified and marked as necessary  * Patient was positioned for comfort  * Consent was verbally obtained     Time: 1300  Date of procedure: 11/19/2024  Procedure performed by:  Dino Elliott MD  Provider assisted by: None.   How tolerated by patient: tolerated the procedure well with no complications    Procedure Name: L elbow bursa Aspiration/Steroid Injection  Indication: Pain  Location: left  Pre-Procedure Diagnosis: L olecranon bursitis   Post-Procedure Diagnosis: Same    PROCEDURE:  The area was prepped in the usual sterile fashion and the overlying skin cleaned using Betadine x3.  A needle of appropriate length and gauge was inserted into the bursa. Gentle aspiration demonstrated 9cc of blood. 1 ml of Lidocaine 1% without epinephrine and 0.5ml of Kenalog 40 mg/1mL was injected without resistance.     An adhesive bandage was applied to the area. Anticipatory guidance, as well as standard post-procedure care, was explained. Return precautions were given. The patient tolerated the procedure well without complications. Follow-up visit scheduled.          Objective   Blood pressure 130/84, pulse 87, temperature 98.1 °F (36.7 °C), temperature source Tympanic, resp. rate 16, weight 83.6 kg (184 lb 6 oz), SpO2 96%.Body mass index is 33.72 kg/m².  General appearance - Alert, NAD, normal appearance.   Head - Atraumatic. Normocephalic.   Eyes - EOMI. Sclera white.   Respiratory - Pulmonary effort is normal. No respiratory

## 2024-12-02 ENCOUNTER — TELEPHONE (OUTPATIENT)
Facility: CLINIC | Age: 54
End: 2024-12-02

## 2024-12-02 NOTE — TELEPHONE ENCOUNTER
Mr. Levine Wife called into the office concerned about husbands blood pressure being elevated over the weekend. Pt's wife stated patient's blood pressure ws elevated this morning before he went to work. I asked the wife to ask the patient to give the office a call because I needed to speak with him. Patient called the office and he states he feels ok now. He stated over the weekend he was having stomach cramps with vomiting and light headiness. Patient state his blood pressure was 228/158 on Saturday. Patient has been informed to take his blood pressure everyday for the rest of the week and send us the readings through his my chart and if he starts to feel the way he felt on Saturday and if his blood pressure spikes like that to seek ER if he is unable to get into the office. Patient states he can do that.

## 2024-12-26 ENCOUNTER — OFFICE VISIT (OUTPATIENT)
Facility: CLINIC | Age: 54
End: 2024-12-26

## 2024-12-26 VITALS
DIASTOLIC BLOOD PRESSURE: 70 MMHG | TEMPERATURE: 99.8 F | BODY MASS INDEX: 31.64 KG/M2 | SYSTOLIC BLOOD PRESSURE: 120 MMHG | WEIGHT: 173 LBS

## 2024-12-26 DIAGNOSIS — R68.89 FLU-LIKE SYMPTOMS: ICD-10-CM

## 2024-12-26 DIAGNOSIS — J10.1 INFLUENZA A: Primary | ICD-10-CM

## 2024-12-26 DIAGNOSIS — R11.0 NAUSEA: ICD-10-CM

## 2024-12-26 LAB
INFLUENZA A ANTIGEN, POC: POSITIVE
INFLUENZA B ANTIGEN, POC: NEGATIVE
VALID INTERNAL CONTROL, POC: YES

## 2024-12-26 RX ORDER — OSELTAMIVIR PHOSPHATE 75 MG/1
75 CAPSULE ORAL 2 TIMES DAILY
Qty: 10 CAPSULE | Refills: 0 | Status: SHIPPED | OUTPATIENT
Start: 2024-12-26 | End: 2024-12-31

## 2024-12-26 RX ORDER — ONDANSETRON 8 MG/1
8 TABLET, FILM COATED ORAL EVERY 8 HOURS PRN
Qty: 28 TABLET | Refills: 0 | Status: SHIPPED | OUTPATIENT
Start: 2024-12-26

## 2024-12-26 NOTE — PROGRESS NOTES
\"Have you been to the ER, urgent care clinic since your last visit?  Hospitalized since your last visit?\"    NO    “Have you seen or consulted any other health care providers outside our system since your last visit?”    NO      “Have you had a diabetic eye exam?”    NO     No diabetic eye exam on file            
Normocephalic. No lymphadenopathy  Eyes - EOMI. Sclera white.   Ears - Hearing grossly normal. TMs with erythema, no bulge bilaterally. No cerumen impaction.   Nose - Nares patent, no polyps  Throat - moist, pharynx without erythema or exudates.   Respiratory - LCTAB. Effort normal, without respiratory distress. No wheeze/rale/rhonchi  Heart - Normal rate, regular rhythm. No m/r/r  Abdomen - Soft, non tender. Non distended.   Neurological - No gross focal deficits. Speech normal. Alert and oriented. Mental status is at baseline.   Musculoskeletal - Grossly normal ROM, Gait normal.    Extremities - No LE edema.   Skin - normal coloration and normal turgor. No cyanosis, no rash.         Medical History- Reviewed Social History- Reviewed Surgical History- Reviewed   Delroy has a past medical history of Arthritis, GERD (gastroesophageal reflux disease), and Neck injury. Delroy reports that he has never smoked. He has never used smokeless tobacco. He reports that he does not drink alcohol and does not use drugs. Delroy has a past surgical history that includes esophageal motility study (10/10/2018) and hernia repair (1992).         Problem List- Reviewed   Delroy has DDD (degenerative disc disease), lumbar; Hypertension, uncontrolled; Left lumbar radiculitis; Chronic cluster headache, not intractable; Diabetes mellitus (HCC); and Hidradenitis suppurativa on their problem list.       Current Outpatient Medications   Medication Instructions    Alcohol Swabs PADS Use daily and as needed with glucose monitor and lancets    amLODIPine-benazepril (LOTREL) 5-20 MG per capsule 2 capsules, Oral, DAILY    blood glucose monitor strips Test 1 times a day & as needed for symptoms of irregular blood glucose. Dispense sufficient amount for indicated testing frequency plus additional to accommodate PRN testing needs.    Blood Glucose Monitoring Suppl (BLOOD GLUCOSE MONITOR SYSTEM) w/Device KIT Use once daily and as needed to check blood sugar.

## 2024-12-30 ENCOUNTER — TELEPHONE (OUTPATIENT)
Facility: CLINIC | Age: 54
End: 2024-12-30

## 2024-12-30 DIAGNOSIS — E11.628 TYPE 2 DIABETES MELLITUS WITH OTHER SKIN COMPLICATION, WITHOUT LONG-TERM CURRENT USE OF INSULIN (HCC): Primary | ICD-10-CM

## 2024-12-30 RX ORDER — SEMAGLUTIDE 1.34 MG/ML
1 INJECTION, SOLUTION SUBCUTANEOUS
Qty: 3 ML | Refills: 0 | Status: SHIPPED | OUTPATIENT
Start: 2024-12-30

## 2024-12-30 NOTE — TELEPHONE ENCOUNTER
This patient contacted office for the following prescriptions to be filled:    Medication requested :   Semaglutide,0.25 or 0.5MG/DOS, 2 MG/3ML SOPN   PCP: Earl  Pharmacy or Print: Pharmacy   Mail order or Local pharmacy Local (  [facility] [id number] - [place], [place]       Scheduled appointment if not seen by current providers in office: LOV:  [id number]    Pt is completley out of the medication listed. Pt would also like to know if something can be sent over for stomach issues/nausea.

## 2025-01-28 ENCOUNTER — OFFICE VISIT (OUTPATIENT)
Facility: CLINIC | Age: 55
End: 2025-01-28
Payer: COMMERCIAL

## 2025-01-28 ENCOUNTER — HOSPITAL ENCOUNTER (OUTPATIENT)
Facility: HOSPITAL | Age: 55
Setting detail: SPECIMEN
Discharge: HOME | End: 2025-01-31
Payer: COMMERCIAL

## 2025-01-28 VITALS
HEART RATE: 78 BPM | SYSTOLIC BLOOD PRESSURE: 124 MMHG | OXYGEN SATURATION: 98 % | DIASTOLIC BLOOD PRESSURE: 84 MMHG | RESPIRATION RATE: 16 BRPM | TEMPERATURE: 98.7 F | WEIGHT: 177.38 LBS | BODY MASS INDEX: 32.44 KG/M2

## 2025-01-28 DIAGNOSIS — K59.03 DRUG-INDUCED CONSTIPATION: ICD-10-CM

## 2025-01-28 DIAGNOSIS — E11.628 TYPE 2 DIABETES MELLITUS WITH OTHER SKIN COMPLICATION, WITHOUT LONG-TERM CURRENT USE OF INSULIN (HCC): Primary | ICD-10-CM

## 2025-01-28 DIAGNOSIS — E78.2 MIXED HYPERLIPIDEMIA: ICD-10-CM

## 2025-01-28 DIAGNOSIS — E11.628 TYPE 2 DIABETES MELLITUS WITH OTHER SKIN COMPLICATION, WITHOUT LONG-TERM CURRENT USE OF INSULIN (HCC): ICD-10-CM

## 2025-01-28 LAB
CHOLEST SERPL-MCNC: 164 MG/DL
EST. AVERAGE GLUCOSE BLD GHB EST-MCNC: 131 MG/DL
HBA1C MFR BLD: 6.2 % (ref 4.2–5.6)
HDLC SERPL-MCNC: 37 MG/DL (ref 40–60)
HDLC SERPL: 4.4 (ref 0–5)
LDLC SERPL CALC-MCNC: 85.4 MG/DL (ref 0–100)
LIPID PANEL: ABNORMAL
TRIGL SERPL-MCNC: 208 MG/DL
VLDLC SERPL CALC-MCNC: 41.6 MG/DL

## 2025-01-28 PROCEDURE — 3044F HG A1C LEVEL LT 7.0%: CPT | Performed by: STUDENT IN AN ORGANIZED HEALTH CARE EDUCATION/TRAINING PROGRAM

## 2025-01-28 PROCEDURE — 80061 LIPID PANEL: CPT

## 2025-01-28 PROCEDURE — 83036 HEMOGLOBIN GLYCOSYLATED A1C: CPT

## 2025-01-28 PROCEDURE — 3079F DIAST BP 80-89 MM HG: CPT | Performed by: STUDENT IN AN ORGANIZED HEALTH CARE EDUCATION/TRAINING PROGRAM

## 2025-01-28 PROCEDURE — 99214 OFFICE O/P EST MOD 30 MIN: CPT | Performed by: STUDENT IN AN ORGANIZED HEALTH CARE EDUCATION/TRAINING PROGRAM

## 2025-01-28 PROCEDURE — 3074F SYST BP LT 130 MM HG: CPT | Performed by: STUDENT IN AN ORGANIZED HEALTH CARE EDUCATION/TRAINING PROGRAM

## 2025-01-28 RX ORDER — ATORVASTATIN CALCIUM 20 MG/1
20 TABLET, FILM COATED ORAL DAILY
Qty: 90 TABLET | Refills: 3 | Status: SHIPPED | OUTPATIENT
Start: 2025-01-28

## 2025-01-28 SDOH — ECONOMIC STABILITY: FOOD INSECURITY: WITHIN THE PAST 12 MONTHS, YOU WORRIED THAT YOUR FOOD WOULD RUN OUT BEFORE YOU GOT MONEY TO BUY MORE.: NEVER TRUE

## 2025-01-28 SDOH — ECONOMIC STABILITY: FOOD INSECURITY: WITHIN THE PAST 12 MONTHS, THE FOOD YOU BOUGHT JUST DIDN'T LAST AND YOU DIDN'T HAVE MONEY TO GET MORE.: NEVER TRUE

## 2025-01-28 ASSESSMENT — PATIENT HEALTH QUESTIONNAIRE - PHQ9
SUM OF ALL RESPONSES TO PHQ QUESTIONS 1-9: 4
9. THOUGHTS THAT YOU WOULD BE BETTER OFF DEAD, OR OF HURTING YOURSELF: NOT AT ALL
1. LITTLE INTEREST OR PLEASURE IN DOING THINGS: NOT AT ALL
SUM OF ALL RESPONSES TO PHQ QUESTIONS 1-9: 4
4. FEELING TIRED OR HAVING LITTLE ENERGY: SEVERAL DAYS
5. POOR APPETITE OR OVEREATING: NOT AT ALL
2. FEELING DOWN, DEPRESSED OR HOPELESS: NOT AT ALL
7. TROUBLE CONCENTRATING ON THINGS, SUCH AS READING THE NEWSPAPER OR WATCHING TELEVISION: NOT AT ALL
SUM OF ALL RESPONSES TO PHQ9 QUESTIONS 1 & 2: 0
SUM OF ALL RESPONSES TO PHQ QUESTIONS 1-9: 4
8. MOVING OR SPEAKING SO SLOWLY THAT OTHER PEOPLE COULD HAVE NOTICED. OR THE OPPOSITE, BEING SO FIGETY OR RESTLESS THAT YOU HAVE BEEN MOVING AROUND A LOT MORE THAN USUAL: NOT AT ALL
3. TROUBLE FALLING OR STAYING ASLEEP: NEARLY EVERY DAY
10. IF YOU CHECKED OFF ANY PROBLEMS, HOW DIFFICULT HAVE THESE PROBLEMS MADE IT FOR YOU TO DO YOUR WORK, TAKE CARE OF THINGS AT HOME, OR GET ALONG WITH OTHER PEOPLE: NOT DIFFICULT AT ALL
6. FEELING BAD ABOUT YOURSELF - OR THAT YOU ARE A FAILURE OR HAVE LET YOURSELF OR YOUR FAMILY DOWN: NOT AT ALL
SUM OF ALL RESPONSES TO PHQ QUESTIONS 1-9: 4

## 2025-01-28 NOTE — PATIENT INSTRUCTIONS
Start using Miralax daily to help have soft regular bowel movements. I would start with half a cap full daily. You can increase to a cap full daily if needed.

## 2025-01-28 NOTE — PROGRESS NOTES
Delroy Steinsford VICTORINO (: 1970) is a 54 y.o. male, established patient, here for evaluation of the following chief complaint(s):  3 Month Follow-Up (Patient states he has been having a burning sensation in the abdomen. Patient needing refill of Ozempic not knowing if dosage will be going up )        Assessment & Plan  1. Diabetes mellitus: Stable. Last A1c: 7.3 at 194 pounds.  - Maintain Ozempic 1 mg for 3 months due to some GI distress  - Discussed benefits of Ozempic over metformin for weight loss  - Blood draw for A1c today  - Prescription sent to [facility] in [place]    2. Cholesterol management: Mildly elevated 6 months ago. Risk of major cardiovascular event in next decade: ASCVD score ~15%.  - Initiate moderate dose of Lipitor  - Discussed benefits and side effects of statins  - Report any muscle aches    3. Constipation: Likely due to Ozempic. Expected to be temporary.  - Advise daily MiraLAX for soft, regular bowel movements    Follow-up  - A1c test    Results  - Laboratory Studies:    - Last A1c: 7.3    - Cholesterol levels mildly elevated 6 months ago  1. Type 2 diabetes mellitus with other skin complication, without long-term current use of insulin (HCC)  -     Semaglutide, 1 MG/DOSE, (OZEMPIC) 4 MG/3ML SOPN sc injection; Inject 1 mg into the skin every 7 days, Disp-3 mL, R-2Normal  -     Hemoglobin A1C; Future  -     Lipid Panel; Future  2. Mixed hyperlipidemia  -     atorvastatin (LIPITOR) 20 MG tablet; Take 1 tablet by mouth daily, Disp-90 tablet, R-3Normal  3. Drug-induced constipation    Return in about 3 months (around 2025) for diabetes management.         Subjective   History of Present Illness  The patient is a 55-year-old male presenting for diabetes, cholesterol management, and constipation.    He reports a deep-seated burning abdominal sensation, noticeable when lifting his leg during a shower. No associated skin changes. He suspects the discomfort is due to excessive coughing

## 2025-01-30 DIAGNOSIS — E11.628 TYPE 2 DIABETES MELLITUS WITH OTHER SKIN COMPLICATION, WITHOUT LONG-TERM CURRENT USE OF INSULIN (HCC): ICD-10-CM

## 2025-01-30 RX ORDER — SEMAGLUTIDE 1.34 MG/ML
INJECTION, SOLUTION SUBCUTANEOUS
Qty: 3 ML | Refills: 2 | OUTPATIENT
Start: 2025-01-30

## 2025-02-01 PROBLEM — E78.2 MIXED HYPERLIPIDEMIA: Status: ACTIVE | Noted: 2025-02-01

## 2025-02-11 ENCOUNTER — TELEPHONE (OUTPATIENT)
Facility: CLINIC | Age: 55
End: 2025-02-11

## 2025-02-11 NOTE — TELEPHONE ENCOUNTER
Patient  called  stating, that a RX was submitted at his last visit  for Semaglutide, 1 MG/DOSE, (OZEMPIC) 4 MG/3ML SOPN sc injection, however the medication is 300.00 and is requesting a alternant to this medicat

## 2025-04-15 ENCOUNTER — RESULTS FOLLOW-UP (OUTPATIENT)
Facility: CLINIC | Age: 55
End: 2025-04-15

## 2025-04-28 ENCOUNTER — OFFICE VISIT (OUTPATIENT)
Facility: CLINIC | Age: 55
End: 2025-04-28
Payer: COMMERCIAL

## 2025-04-28 VITALS
OXYGEN SATURATION: 97 % | WEIGHT: 176.5 LBS | SYSTOLIC BLOOD PRESSURE: 140 MMHG | BODY MASS INDEX: 32.28 KG/M2 | HEART RATE: 72 BPM | DIASTOLIC BLOOD PRESSURE: 102 MMHG | RESPIRATION RATE: 16 BRPM | TEMPERATURE: 98 F

## 2025-04-28 DIAGNOSIS — E11.65 TYPE 2 DIABETES MELLITUS WITH HYPERGLYCEMIA, WITHOUT LONG-TERM CURRENT USE OF INSULIN (HCC): ICD-10-CM

## 2025-04-28 DIAGNOSIS — E11.628 TYPE 2 DIABETES MELLITUS WITH OTHER SKIN COMPLICATION, WITHOUT LONG-TERM CURRENT USE OF INSULIN (HCC): Primary | ICD-10-CM

## 2025-04-28 DIAGNOSIS — R13.10 DYSPHAGIA, UNSPECIFIED TYPE: ICD-10-CM

## 2025-04-28 DIAGNOSIS — L73.2 HIDRADENITIS SUPPURATIVA: ICD-10-CM

## 2025-04-28 LAB — HBA1C MFR BLD: 7.3 %

## 2025-04-28 PROCEDURE — 3051F HG A1C>EQUAL 7.0%<8.0%: CPT | Performed by: STUDENT IN AN ORGANIZED HEALTH CARE EDUCATION/TRAINING PROGRAM

## 2025-04-28 PROCEDURE — 3077F SYST BP >= 140 MM HG: CPT | Performed by: STUDENT IN AN ORGANIZED HEALTH CARE EDUCATION/TRAINING PROGRAM

## 2025-04-28 PROCEDURE — 99214 OFFICE O/P EST MOD 30 MIN: CPT | Performed by: STUDENT IN AN ORGANIZED HEALTH CARE EDUCATION/TRAINING PROGRAM

## 2025-04-28 PROCEDURE — 3080F DIAST BP >= 90 MM HG: CPT | Performed by: STUDENT IN AN ORGANIZED HEALTH CARE EDUCATION/TRAINING PROGRAM

## 2025-04-28 PROCEDURE — 83036 HEMOGLOBIN GLYCOSYLATED A1C: CPT | Performed by: STUDENT IN AN ORGANIZED HEALTH CARE EDUCATION/TRAINING PROGRAM

## 2025-04-28 RX ORDER — DOXYCYCLINE HYCLATE 100 MG
TABLET ORAL
Qty: 84 TABLET | Refills: 1 | Status: SHIPPED | OUTPATIENT
Start: 2025-04-28

## 2025-04-28 NOTE — PROGRESS NOTES
Have you been to the ER, urgent care clinic since your last visit?  Hospitalized since your last visit?   YES - When: approximately 3 months ago.  Where and Why: muscle strain, [facility].    Have you seen or consulted any other health care providers outside our system since your last visit?   YES - When: approximately 3 months ago.  Where and Why: [facility] ED for a muscle strain..      “Have you had a diabetic eye exam?”    NO     No diabetic eye exam on file            
evaluation of diabetes, hidradenitis, and dysphagia.    He discontinued Ozempic due to cost but wants to resume it. He is unsure of the current price under his insurance now after some visits for the year, [employer] [facility]. He reports feeling better when losing weight but has gained 8 pounds since stopping Ozempic. He continues metformin for diabetes management. His A1c increased from 6.2 to 7.3 since discontinuing Ozempic.    He reports recurrence of skin bumps, previously resolved with antibiotics. Despite using prescribed cream for 1 week, 3 new bumps emerged. No new bumps during antibiotic treatment.    He has an appointment with a gastroenterologist on 05/08/2025 for dysphagia, leading to choking incidents. Last month, a piece of chicken lodged in his throat, causing discomfort and tearing sensation. He experiences severe heartburn and acid reflux. Esophageal surgery was not approved by insurance. He has not taken heartburn medication recently as symptoms were manageable. He typically undergoes annual esophageal dilation and is due for a swallow test.    He did not take his blood pressure pill today due to affected equilibrium. Yesterday, he experienced dizziness while bending over to put up a fence. Today, vertigo symptoms have intensified.           Objective   Blood pressure (!) 140/102, pulse 72, temperature 98 °F (36.7 °C), temperature source Tympanic, resp. rate 16, weight 80.1 kg (176 lb 8 oz), SpO2 97%.Body mass index is 32.28 kg/m².  General appearance - Alert, NAD, normal appearance.   Head - Atraumatic. Normocephalic.   Eyes - EOMI. Sclera white.   Respiratory - Pulmonary effort is normal. No respiratory distress.   Neurological - No gross focal deficits. Speech normal.   Psychiatric - Mood normal. Behavior normal.          Medical History- Reviewed Social History- Reviewed Surgical History- Reviewed   Delroy has a past medical history of Arthritis, GERD (gastroesophageal reflux disease), and Neck

## 2025-05-29 DIAGNOSIS — E11.628 TYPE 2 DIABETES MELLITUS WITH OTHER SKIN COMPLICATION, WITHOUT LONG-TERM CURRENT USE OF INSULIN (HCC): ICD-10-CM

## 2025-05-29 DIAGNOSIS — I10 ESSENTIAL (PRIMARY) HYPERTENSION: ICD-10-CM

## 2025-05-29 RX ORDER — METFORMIN HYDROCHLORIDE 500 MG/1
1000 TABLET, EXTENDED RELEASE ORAL 2 TIMES DAILY WITH MEALS
Qty: 360 TABLET | Refills: 3 | Status: CANCELLED | OUTPATIENT
Start: 2025-05-29

## 2025-06-01 DIAGNOSIS — R11.0 NAUSEA: ICD-10-CM

## 2025-06-01 DIAGNOSIS — J10.1 INFLUENZA A: ICD-10-CM

## 2025-06-02 RX ORDER — ONDANSETRON 8 MG/1
TABLET, FILM COATED ORAL
Qty: 28 TABLET | Refills: 0 | OUTPATIENT
Start: 2025-06-02

## 2025-06-02 RX ORDER — AMLODIPINE AND BENAZEPRIL HYDROCHLORIDE 5; 20 MG/1; MG/1
2 CAPSULE ORAL DAILY
Qty: 180 CAPSULE | Refills: 3 | Status: SHIPPED | OUTPATIENT
Start: 2025-06-02

## 2025-06-02 RX ORDER — OSELTAMIVIR PHOSPHATE 75 MG/1
CAPSULE ORAL
Qty: 10 CAPSULE | Refills: 0 | OUTPATIENT
Start: 2025-06-02

## 2025-06-02 RX ORDER — GLUCOSAMINE HCL/CHONDROITIN SU 500-400 MG
CAPSULE ORAL
Qty: 100 STRIP | Refills: 11 | Status: SHIPPED | OUTPATIENT
Start: 2025-06-02

## 2025-06-17 ENCOUNTER — OFFICE VISIT (OUTPATIENT)
Facility: CLINIC | Age: 55
End: 2025-06-17
Payer: COMMERCIAL

## 2025-06-17 VITALS
SYSTOLIC BLOOD PRESSURE: 110 MMHG | OXYGEN SATURATION: 97 % | WEIGHT: 174.13 LBS | HEART RATE: 88 BPM | BODY MASS INDEX: 32.04 KG/M2 | DIASTOLIC BLOOD PRESSURE: 84 MMHG | HEIGHT: 62 IN | TEMPERATURE: 97.9 F | RESPIRATION RATE: 16 BRPM

## 2025-06-17 DIAGNOSIS — K21.9 HIATAL HERNIA WITH GERD: Primary | ICD-10-CM

## 2025-06-17 DIAGNOSIS — K44.9 HIATAL HERNIA WITH GERD: Primary | ICD-10-CM

## 2025-06-17 PROCEDURE — 99213 OFFICE O/P EST LOW 20 MIN: CPT | Performed by: STUDENT IN AN ORGANIZED HEALTH CARE EDUCATION/TRAINING PROGRAM

## 2025-06-17 PROCEDURE — 3074F SYST BP LT 130 MM HG: CPT | Performed by: STUDENT IN AN ORGANIZED HEALTH CARE EDUCATION/TRAINING PROGRAM

## 2025-06-17 PROCEDURE — 3079F DIAST BP 80-89 MM HG: CPT | Performed by: STUDENT IN AN ORGANIZED HEALTH CARE EDUCATION/TRAINING PROGRAM

## 2025-06-17 NOTE — PROGRESS NOTES
Have you been to the ER, urgent care clinic since your last visit?  Hospitalized since your last visit?   NO    Have you seen or consulted any other health care providers outside our system since your last visit?   NO      “Have you had a diabetic eye exam?”    NO     No diabetic eye exam on file            
Monitoring Suppl (BLOOD GLUCOSE MONITOR SYSTEM) w/Device KIT Use once daily and as needed to check blood sugar. Pharmacist to identify preferred meter and strips.    cyanocobalamin 1,000 mcg, DAILY    esomeprazole (NEXIUM) 20 MG delayed release capsule DAILY    Lancets MISC 1 each, Does not apply, DAILY    metFORMIN (GLUCOPHAGE-XR) 1,000 mg, Oral, 2 times daily with meals    ondansetron (ZOFRAN) 8 mg, Oral, EVERY 8 HOURS PRN    Semaglutide,0.25 or 0.5MG/DOS, 2 MG/3ML SOPN Inject 0.25 mg into the skin once a week for 28 days, THEN 0.5 mg once a week for 28 days.             The patient (or guardian, if applicable) and other individuals in attendance with the patient were advised that Artificial Intelligence will be utilized during this visit to record and process the conversation to generate a clinical note. The patient (or guardian, if applicable) and other individuals in attendance at the appointment consented to the use of AI, including the recording.      An electronic signature was used to authenticate this note.    --Dino Elliott MD

## 2025-06-24 ENCOUNTER — OFFICE VISIT (OUTPATIENT)
Age: 55
End: 2025-06-24
Payer: COMMERCIAL

## 2025-06-24 ENCOUNTER — TELEPHONE (OUTPATIENT)
Age: 55
End: 2025-06-24

## 2025-06-24 VITALS
RESPIRATION RATE: 18 BRPM | SYSTOLIC BLOOD PRESSURE: 138 MMHG | DIASTOLIC BLOOD PRESSURE: 84 MMHG | HEART RATE: 64 BPM | HEIGHT: 62 IN | TEMPERATURE: 98.3 F | WEIGHT: 176 LBS | BODY MASS INDEX: 32.39 KG/M2 | OXYGEN SATURATION: 97 %

## 2025-06-24 DIAGNOSIS — R13.10 DYSPHAGIA, UNSPECIFIED TYPE: ICD-10-CM

## 2025-06-24 DIAGNOSIS — E78.2 MIXED HYPERLIPIDEMIA: ICD-10-CM

## 2025-06-24 DIAGNOSIS — F50.84 RUMINATION SYNDROME OF INGESTED FOOD IN ADULT: ICD-10-CM

## 2025-06-24 DIAGNOSIS — K21.9 GASTROESOPHAGEAL REFLUX DISEASE, UNSPECIFIED WHETHER ESOPHAGITIS PRESENT: Primary | ICD-10-CM

## 2025-06-24 DIAGNOSIS — E11.65 TYPE 2 DIABETES MELLITUS WITH HYPERGLYCEMIA, WITHOUT LONG-TERM CURRENT USE OF INSULIN (HCC): ICD-10-CM

## 2025-06-24 DIAGNOSIS — I10 HYPERTENSION, UNCONTROLLED: ICD-10-CM

## 2025-06-24 PROCEDURE — 3051F HG A1C>EQUAL 7.0%<8.0%: CPT | Performed by: SURGERY

## 2025-06-24 PROCEDURE — 3075F SYST BP GE 130 - 139MM HG: CPT | Performed by: SURGERY

## 2025-06-24 PROCEDURE — 99205 OFFICE O/P NEW HI 60 MIN: CPT | Performed by: SURGERY

## 2025-06-24 PROCEDURE — 3079F DIAST BP 80-89 MM HG: CPT | Performed by: SURGERY

## 2025-06-24 RX ORDER — OMEPRAZOLE 40 MG/1
40 CAPSULE, DELAYED RELEASE ORAL DAILY
COMMUNITY

## 2025-06-24 RX ORDER — SEMAGLUTIDE 0.68 MG/ML
INJECTION, SOLUTION SUBCUTANEOUS
COMMUNITY
Start: 2025-06-02

## 2025-06-24 NOTE — PROGRESS NOTES
reflux (imbalances in function or pressures related to the LES, impact of hiatal/paraesophageal hernias, gastroparesis/diabetes, obesity, high intraabdominal pressures, maladaptive eating habits, etc). We discussed treatment options including medications (H2 or PPI, prokinetics in certain cases, medications to address visceral hypersensitivity, etc) and surgery (including MIS approaches for paraesophageal hernia repair with fundoplication vs magnetic sphincter augmentation). We discussed the risks of surgery including, but not limited to: bleeding, infection, visceral injury, VTE, MI, CVA, recurrence of hiatal/paraesophageal hernia, dysphagia, gas bloat, refractory/recurrent reflux/regurgitation, need for future procedures, persistent need for medical management, etc). We reviewed the potential limitations for MRI imaging in the setting of magnetic sphincter augmentation. We reviewed the risk of erosion from implants such as mesh, sutures, and MSA devices.     Due to his ongoing chronic dysphagia, he is likely to experience postop dysphagia after a LINX/MSA.    It would be reasonable to repair the hiatal hernia and perform a partial fundoplication. We reviewed that the ongoing use of Ozempic or his diabetes are likely at least partially contributing to his symptoms of possible delayed gastric emptying. I did not order a gastric emptying study since it has a high likelihood of being positive for DGE in the setting of ongoing use of Ozempic. If he does have diabetic gastroparesis or medication induced DGE, antireflux surgery may not address all of his symptoms.     We did briefly discuss that prior to his use of Ozempic, due to his BMI of 38, he would have met insurance criteria for gastric bypass with hiatal hernia repair to address his obesity, diabetes, hypertension, hyperlipidemia, and GERD. I discussed that if he were to stop using Ozempic due to side effects, he would likely regain weight which would work  weakness/difficulty walking/loss of sensation

## 2025-07-17 ENCOUNTER — HOSPITAL ENCOUNTER (OUTPATIENT)
Facility: HOSPITAL | Age: 55
Discharge: HOME | End: 2025-07-20
Attending: SURGERY
Payer: COMMERCIAL

## 2025-07-17 DIAGNOSIS — E11.65 TYPE 2 DIABETES MELLITUS WITH HYPERGLYCEMIA, WITHOUT LONG-TERM CURRENT USE OF INSULIN (HCC): ICD-10-CM

## 2025-07-17 DIAGNOSIS — E78.2 MIXED HYPERLIPIDEMIA: ICD-10-CM

## 2025-07-17 DIAGNOSIS — K22.4 MOTILITY DISORDER, ESOPHAGEAL: ICD-10-CM

## 2025-07-17 DIAGNOSIS — R13.10 DYSPHAGIA, UNSPECIFIED TYPE: ICD-10-CM

## 2025-07-17 DIAGNOSIS — I10 HYPERTENSION, UNCONTROLLED: ICD-10-CM

## 2025-07-17 DIAGNOSIS — K21.9 GASTROESOPHAGEAL REFLUX DISEASE, UNSPECIFIED WHETHER ESOPHAGITIS PRESENT: ICD-10-CM

## 2025-07-17 PROCEDURE — 74246 X-RAY XM UPR GI TRC 2CNTRST: CPT

## 2025-07-17 PROCEDURE — 74220 X-RAY XM ESOPHAGUS 1CNTRST: CPT

## (undated) DEVICE — SYR 50ML LR LCK 1ML GRAD NSAF --

## (undated) DEVICE — SYRINGE MED 3ML NDL 22GA L1IN PLAS N CTRL LUERLOCK TIP REG

## (undated) DEVICE — GAUZE,SPONGE,4"X4",16PLY,STRL,LF,10/TRAY: Brand: MEDLINE

## (undated) DEVICE — BASIN EMESIS 500CC ROSE 250/CS 60/PLT: Brand: MEDEGEN MEDICAL PRODUCTS, LLC

## (undated) DEVICE — (D)SYR 10ML 1/5ML GRAD NSAF -- PKGING CHANGE USE ITEM 338027

## (undated) DEVICE — GAUZE SPONGES,16 PLY: Brand: CURITY